# Patient Record
Sex: MALE | Race: BLACK OR AFRICAN AMERICAN | NOT HISPANIC OR LATINO | URBAN - METROPOLITAN AREA
[De-identification: names, ages, dates, MRNs, and addresses within clinical notes are randomized per-mention and may not be internally consistent; named-entity substitution may affect disease eponyms.]

---

## 2017-12-04 PROBLEM — Z00.00 ENCOUNTER FOR PREVENTIVE HEALTH EXAMINATION: Status: ACTIVE | Noted: 2017-12-04

## 2021-05-06 ENCOUNTER — INPATIENT (INPATIENT)
Facility: HOSPITAL | Age: 26
LOS: 6 days | Discharge: HOME | DRG: 392 | End: 2021-05-13
Attending: HOSPITALIST | Admitting: STUDENT IN AN ORGANIZED HEALTH CARE EDUCATION/TRAINING PROGRAM
Payer: COMMERCIAL

## 2021-05-06 VITALS
WEIGHT: 279.99 LBS | RESPIRATION RATE: 18 BRPM | HEART RATE: 88 BPM | TEMPERATURE: 98 F | DIASTOLIC BLOOD PRESSURE: 88 MMHG | SYSTOLIC BLOOD PRESSURE: 138 MMHG | OXYGEN SATURATION: 95 %

## 2021-05-06 LAB
ALBUMIN SERPL ELPH-MCNC: 5.5 G/DL — HIGH (ref 3.3–5)
ALP SERPL-CCNC: 107 U/L — SIGNIFICANT CHANGE UP (ref 40–120)
ALT FLD-CCNC: 69 U/L — HIGH (ref 10–45)
ANION GAP SERPL CALC-SCNC: 26 MMOL/L — HIGH (ref 5–17)
AST SERPL-CCNC: 32 U/L — SIGNIFICANT CHANGE UP (ref 10–40)
BASOPHILS # BLD AUTO: 0.04 K/UL — SIGNIFICANT CHANGE UP (ref 0–0.2)
BASOPHILS NFR BLD AUTO: 0.7 % — SIGNIFICANT CHANGE UP (ref 0–2)
BILIRUB SERPL-MCNC: 1.1 MG/DL — SIGNIFICANT CHANGE UP (ref 0.2–1.2)
BUN SERPL-MCNC: 9 MG/DL — SIGNIFICANT CHANGE UP (ref 7–23)
CALCIUM SERPL-MCNC: 10.8 MG/DL — HIGH (ref 8.4–10.5)
CHLORIDE SERPL-SCNC: 83 MMOL/L — LOW (ref 96–108)
CO2 SERPL-SCNC: 29 MMOL/L — SIGNIFICANT CHANGE UP (ref 22–31)
CREAT SERPL-MCNC: 1.09 MG/DL — SIGNIFICANT CHANGE UP (ref 0.5–1.3)
EOSINOPHIL # BLD AUTO: 0.09 K/UL — SIGNIFICANT CHANGE UP (ref 0–0.5)
EOSINOPHIL NFR BLD AUTO: 1.5 % — SIGNIFICANT CHANGE UP (ref 0–6)
GAS PNL BLDV: SIGNIFICANT CHANGE UP
GAS PNL BLDV: SIGNIFICANT CHANGE UP
GLUCOSE SERPL-MCNC: 79 MG/DL — SIGNIFICANT CHANGE UP (ref 70–99)
HCT VFR BLD CALC: 48.3 % — SIGNIFICANT CHANGE UP (ref 39–50)
HGB BLD-MCNC: 16.8 G/DL — SIGNIFICANT CHANGE UP (ref 13–17)
IMM GRANULOCYTES NFR BLD AUTO: 0.2 % — SIGNIFICANT CHANGE UP (ref 0–1.5)
LIDOCAIN IGE QN: 28 U/L — SIGNIFICANT CHANGE UP (ref 7–60)
LYMPHOCYTES # BLD AUTO: 1.49 K/UL — SIGNIFICANT CHANGE UP (ref 1–3.3)
LYMPHOCYTES # BLD AUTO: 25.3 % — SIGNIFICANT CHANGE UP (ref 13–44)
MCHC RBC-ENTMCNC: 28.8 PG — SIGNIFICANT CHANGE UP (ref 27–34)
MCHC RBC-ENTMCNC: 34.8 GM/DL — SIGNIFICANT CHANGE UP (ref 32–36)
MCV RBC AUTO: 82.7 FL — SIGNIFICANT CHANGE UP (ref 80–100)
MONOCYTES # BLD AUTO: 0.6 K/UL — SIGNIFICANT CHANGE UP (ref 0–0.9)
MONOCYTES NFR BLD AUTO: 10.2 % — SIGNIFICANT CHANGE UP (ref 2–14)
NEUTROPHILS # BLD AUTO: 3.66 K/UL — SIGNIFICANT CHANGE UP (ref 1.8–7.4)
NEUTROPHILS NFR BLD AUTO: 62.1 % — SIGNIFICANT CHANGE UP (ref 43–77)
NRBC # BLD: 0 /100 WBCS — SIGNIFICANT CHANGE UP (ref 0–0)
PLATELET # BLD AUTO: 353 K/UL — SIGNIFICANT CHANGE UP (ref 150–400)
POTASSIUM SERPL-MCNC: 3.1 MMOL/L — LOW (ref 3.5–5.3)
POTASSIUM SERPL-SCNC: 3.1 MMOL/L — LOW (ref 3.5–5.3)
PROT SERPL-MCNC: 9.1 G/DL — HIGH (ref 6–8.3)
RBC # BLD: 5.84 M/UL — HIGH (ref 4.2–5.8)
RBC # FLD: 12.6 % — SIGNIFICANT CHANGE UP (ref 10.3–14.5)
SODIUM SERPL-SCNC: 138 MMOL/L — SIGNIFICANT CHANGE UP (ref 135–145)
WBC # BLD: 5.89 K/UL — SIGNIFICANT CHANGE UP (ref 3.8–10.5)
WBC # FLD AUTO: 5.89 K/UL — SIGNIFICANT CHANGE UP (ref 3.8–10.5)

## 2021-05-06 PROCEDURE — 93010 ELECTROCARDIOGRAM REPORT: CPT

## 2021-05-06 PROCEDURE — 71045 X-RAY EXAM CHEST 1 VIEW: CPT | Mod: 26

## 2021-05-06 PROCEDURE — 93308 TTE F-UP OR LMTD: CPT | Mod: 26

## 2021-05-06 PROCEDURE — 99285 EMERGENCY DEPT VISIT HI MDM: CPT

## 2021-05-06 RX ORDER — SODIUM CHLORIDE 9 MG/ML
1000 INJECTION, SOLUTION INTRAVENOUS ONCE
Refills: 0 | Status: COMPLETED | OUTPATIENT
Start: 2021-05-06 | End: 2021-05-06

## 2021-05-06 RX ORDER — FAMOTIDINE 10 MG/ML
20 INJECTION INTRAVENOUS ONCE
Refills: 0 | Status: COMPLETED | OUTPATIENT
Start: 2021-05-06 | End: 2021-05-06

## 2021-05-06 RX ORDER — ONDANSETRON 8 MG/1
4 TABLET, FILM COATED ORAL ONCE
Refills: 0 | Status: COMPLETED | OUTPATIENT
Start: 2021-05-06 | End: 2021-05-06

## 2021-05-06 RX ORDER — POTASSIUM CHLORIDE 20 MEQ
40 PACKET (EA) ORAL ONCE
Refills: 0 | Status: COMPLETED | OUTPATIENT
Start: 2021-05-06 | End: 2021-05-06

## 2021-05-06 RX ADMIN — SODIUM CHLORIDE 1000 MILLILITER(S): 9 INJECTION, SOLUTION INTRAVENOUS at 23:25

## 2021-05-06 RX ADMIN — Medication 40 MILLIEQUIVALENT(S): at 23:46

## 2021-05-06 RX ADMIN — SODIUM CHLORIDE 1000 MILLILITER(S): 9 INJECTION, SOLUTION INTRAVENOUS at 22:30

## 2021-05-06 RX ADMIN — FAMOTIDINE 20 MILLIGRAM(S): 10 INJECTION INTRAVENOUS at 22:30

## 2021-05-06 RX ADMIN — ONDANSETRON 4 MILLIGRAM(S): 8 TABLET, FILM COATED ORAL at 22:30
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
## 2021-05-06 NOTE — ED ADULT NURSE NOTE - NSIMPLEMENTINTERV_GEN_ALL_ED
Implemented All Universal Safety Interventions:  [place] to call system. Call bell, personal items and telephone within reach. Instruct patient to call for assistance. Room bathroom lighting operational. Non-slip footwear when patient is off stretcher. Physically safe environment: no spills, clutter or unnecessary equipment. Stretcher in lowest position, wheels locked, appropriate side rails in place.
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
## 2021-05-06 NOTE — ED PROVIDER NOTE - CARE PLAN
Principal Discharge DX:	Vomiting   Principal Discharge DX:	Intractable nausea and vomiting  Secondary Diagnosis:	Hypokalemia  Secondary Diagnosis:	Abnormal EKG

## 2021-05-06 NOTE — ED PROVIDER NOTE - ATTENDING CONTRIBUTION TO CARE
Attending MD Raza:  I personally have seen and examined this patient.  Resident note reviewed and agree on plan of care and except where noted.  See HPI, PE, and MDM for details.
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
## 2021-05-06 NOTE — ED PROVIDER NOTE - PROGRESS NOTE DETAILS
Chase Iraheta PGY3  ct abd neg, patient cannot tolerate PO, still having vomiting, admit Kenneth DUNCAN: Received sign out from my colleague Dr. Raza pt presents w repeated episodes of n/v w prior non dx workup at OSH, pending Ct and reassessment at time of sign out, will recheck bmp ct non actionable pt unable to tolerate PO, accepted to medicine.

## 2021-05-07 DIAGNOSIS — E87.6 HYPOKALEMIA: ICD-10-CM

## 2021-05-07 DIAGNOSIS — R11.2 NAUSEA WITH VOMITING, UNSPECIFIED: ICD-10-CM

## 2021-05-07 DIAGNOSIS — E86.0 DEHYDRATION: ICD-10-CM

## 2021-05-07 DIAGNOSIS — Z87.81 PERSONAL HISTORY OF (HEALED) TRAUMATIC FRACTURE: Chronic | ICD-10-CM

## 2021-05-07 DIAGNOSIS — R10.9 UNSPECIFIED ABDOMINAL PAIN: ICD-10-CM

## 2021-05-07 DIAGNOSIS — R11.10 VOMITING, UNSPECIFIED: ICD-10-CM

## 2021-05-07 DIAGNOSIS — Z29.9 ENCOUNTER FOR PROPHYLACTIC MEASURES, UNSPECIFIED: ICD-10-CM

## 2021-05-07 LAB
AMPHET UR-MCNC: NEGATIVE — SIGNIFICANT CHANGE UP
ANION GAP SERPL CALC-SCNC: 20 MMOL/L — HIGH (ref 5–17)
APPEARANCE UR: CLEAR — SIGNIFICANT CHANGE UP
BACTERIA # UR AUTO: NEGATIVE — SIGNIFICANT CHANGE UP
BARBITURATES UR SCN-MCNC: NEGATIVE — SIGNIFICANT CHANGE UP
BENZODIAZ UR-MCNC: NEGATIVE — SIGNIFICANT CHANGE UP
BILIRUB UR-MCNC: ABNORMAL
BUN SERPL-MCNC: 8 MG/DL — SIGNIFICANT CHANGE UP (ref 7–23)
CALCIUM SERPL-MCNC: 10 MG/DL — SIGNIFICANT CHANGE UP (ref 8.4–10.5)
CHLORIDE SERPL-SCNC: 86 MMOL/L — LOW (ref 96–108)
CO2 SERPL-SCNC: 28 MMOL/L — SIGNIFICANT CHANGE UP (ref 22–31)
COCAINE METAB.OTHER UR-MCNC: NEGATIVE — SIGNIFICANT CHANGE UP
COLOR SPEC: YELLOW — SIGNIFICANT CHANGE UP
CREAT SERPL-MCNC: 1 MG/DL — SIGNIFICANT CHANGE UP (ref 0.5–1.3)
DIFF PNL FLD: NEGATIVE — SIGNIFICANT CHANGE UP
EPI CELLS # UR: 2 /HPF — SIGNIFICANT CHANGE UP
GLUCOSE SERPL-MCNC: 84 MG/DL — SIGNIFICANT CHANGE UP (ref 70–99)
GLUCOSE UR QL: NEGATIVE — SIGNIFICANT CHANGE UP
HYALINE CASTS # UR AUTO: 7 /LPF — HIGH (ref 0–2)
KETONES UR-MCNC: ABNORMAL
LEUKOCYTE ESTERASE UR-ACNC: NEGATIVE — SIGNIFICANT CHANGE UP
MAGNESIUM SERPL-MCNC: 1.8 MG/DL — SIGNIFICANT CHANGE UP (ref 1.6–2.6)
METHADONE UR-MCNC: NEGATIVE — SIGNIFICANT CHANGE UP
NITRITE UR-MCNC: NEGATIVE — SIGNIFICANT CHANGE UP
OPIATES UR-MCNC: NEGATIVE — SIGNIFICANT CHANGE UP
OXYCODONE UR-MCNC: NEGATIVE — SIGNIFICANT CHANGE UP
PCP SPEC-MCNC: SIGNIFICANT CHANGE UP
PCP UR-MCNC: NEGATIVE — SIGNIFICANT CHANGE UP
PH UR: 6.5 — SIGNIFICANT CHANGE UP (ref 5–8)
PHOSPHATE SERPL-MCNC: 3.6 MG/DL — SIGNIFICANT CHANGE UP (ref 2.5–4.5)
POTASSIUM SERPL-MCNC: 3 MMOL/L — LOW (ref 3.5–5.3)
POTASSIUM SERPL-SCNC: 3 MMOL/L — LOW (ref 3.5–5.3)
PROT UR-MCNC: ABNORMAL
RBC CASTS # UR COMP ASSIST: 1 /HPF — SIGNIFICANT CHANGE UP (ref 0–4)
SARS-COV-2 RNA SPEC QL NAA+PROBE: SIGNIFICANT CHANGE UP
SODIUM SERPL-SCNC: 134 MMOL/L — LOW (ref 135–145)
SP GR SPEC: >1.05 (ref 1.01–1.02)
THC UR QL: POSITIVE
TROPONIN T, HIGH SENSITIVITY RESULT: 11 NG/L — SIGNIFICANT CHANGE UP (ref 0–51)
UROBILINOGEN FLD QL: ABNORMAL
WBC UR QL: 2 /HPF — SIGNIFICANT CHANGE UP (ref 0–5)

## 2021-05-07 PROCEDURE — 99223 1ST HOSP IP/OBS HIGH 75: CPT | Mod: GC

## 2021-05-07 PROCEDURE — 99223 1ST HOSP IP/OBS HIGH 75: CPT

## 2021-05-07 PROCEDURE — 74177 CT ABD & PELVIS W/CONTRAST: CPT | Mod: 26,MG

## 2021-05-07 PROCEDURE — 12345: CPT | Mod: NC

## 2021-05-07 PROCEDURE — G1004: CPT

## 2021-05-07 RX ORDER — POTASSIUM CHLORIDE 20 MEQ
40 PACKET (EA) ORAL ONCE
Refills: 0 | Status: COMPLETED | OUTPATIENT
Start: 2021-05-07 | End: 2021-05-07

## 2021-05-07 RX ORDER — ONDANSETRON 8 MG/1
4 TABLET, FILM COATED ORAL EVERY 6 HOURS
Refills: 0 | Status: DISCONTINUED | OUTPATIENT
Start: 2021-05-07 | End: 2021-05-13

## 2021-05-07 RX ORDER — ONDANSETRON 8 MG/1
4 TABLET, FILM COATED ORAL ONCE
Refills: 0 | Status: COMPLETED | OUTPATIENT
Start: 2021-05-07 | End: 2021-05-07

## 2021-05-07 RX ORDER — MAGNESIUM SULFATE 500 MG/ML
2 VIAL (ML) INJECTION ONCE
Refills: 0 | Status: COMPLETED | OUTPATIENT
Start: 2021-05-07 | End: 2021-05-07

## 2021-05-07 RX ORDER — PANTOPRAZOLE SODIUM 20 MG/1
40 TABLET, DELAYED RELEASE ORAL
Refills: 0 | Status: DISCONTINUED | OUTPATIENT
Start: 2021-05-07 | End: 2021-05-13

## 2021-05-07 RX ORDER — SODIUM CHLORIDE 9 MG/ML
1000 INJECTION, SOLUTION INTRAVENOUS
Refills: 0 | Status: DISCONTINUED | OUTPATIENT
Start: 2021-05-07 | End: 2021-05-13

## 2021-05-07 RX ADMIN — Medication 50 GRAM(S): at 12:49

## 2021-05-07 RX ADMIN — Medication 30 MILLILITER(S): at 22:08

## 2021-05-07 RX ADMIN — SODIUM CHLORIDE 100 MILLILITER(S): 9 INJECTION, SOLUTION INTRAVENOUS at 12:49

## 2021-05-07 RX ADMIN — Medication 40 MILLIEQUIVALENT(S): at 12:49

## 2021-05-07 RX ADMIN — ONDANSETRON 4 MILLIGRAM(S): 8 TABLET, FILM COATED ORAL at 04:26

## 2021-05-07 RX ADMIN — PANTOPRAZOLE SODIUM 40 MILLIGRAM(S): 20 TABLET, DELAYED RELEASE ORAL at 06:37

## 2021-05-07 RX ADMIN — SODIUM CHLORIDE 100 MILLILITER(S): 9 INJECTION, SOLUTION INTRAVENOUS at 23:19

## 2021-05-07 RX ADMIN — SODIUM CHLORIDE 100 MILLILITER(S): 9 INJECTION, SOLUTION INTRAVENOUS at 04:26

## 2021-05-07 RX ADMIN — Medication 40 MILLIEQUIVALENT(S): at 06:37

## 2021-05-07 NOTE — CONSULT NOTE ADULT - ATTENDING COMMENTS
Patient seen and examined, agree with above. he reports ~1 month of nausea/vomiting and epigastric pain. No changes in bowel habits, more constipated due to not eating, no diarrhea. Reports a 40 lb weight loss. Has had recent CT, EGD at OSH that was unremarkable per patient. Would obtain gastric emptying study to r/o gastroparesis. Obtain urine toxicology screen to evaluate for THC use. Patient seen and examined, agree with above. he reports ~1 month of nausea/vomiting and epigastric pain. Denies current marijuana use. No changes in bowel habits, more constipated due to not eating, no diarrhea. Reports a 40 lb weight loss. Has had recent CT, EGD at OSH that was unremarkable per patient. Would obtain gastric emptying study to r/o gastroparesis. Obtain urine toxicology screen to evaluate for THC use.

## 2021-05-07 NOTE — H&P ADULT - PROBLEM SELECTOR PLAN 4
DVT ppx: scds Likely secondary to vomiting, repleted  EKG w/ T wave inversions, non specific; Trop neg x 2, no active cardiac symptoms

## 2021-05-07 NOTE — CONSULT NOTE ADULT - ASSESSMENT
26 y/o M with no significant PMH now presenting with 1 month of intractable nausea and vomiting. Currently admitted for further workup.    #Nausea/Vomiting- Patient with 1 month history of abdominal cramping a/w persistent nausea/vomiting. Previous workup at OSH reportedly WNL. Current CT A/P normal with normal lipase level. Differential includes gastroparesis vs gastric outlet obstruction vs cyclic vomiting syndrome.  -  26 y/o M with no significant PMH now presenting with 1 month of intractable nausea and vomiting. Currently admitted for further workup.    #Nausea/Vomiting- Patient with 1 month history of abdominal cramping a/w persistent nausea/vomiting. Previous workup at OSH reportedly WNL. Current CT A/P normal with normal lipase level. Differential includes gastroparesis vs gastric outlet obstruction vs cyclic vomiting syndrome.  - please obtain records of previous imaging/endoscopy performed at [facility]  - advance diet as tolerated  - IVF and supportive care per primary team  - as patient with minimal symptom improvement with zofran, can trial reglan 10mg TID  - avoid additional QTc prolongation agents while patient is receiving zofran/reglan    PLEASE AWAIT ATTENDING ATTESTATION BELOW    Cinthia Murguia MD  PGY-2, Internal Medicine  Pager: [phone] 24 y/o M with no significant PMH now presenting with 1 month of intractable nausea and vomiting. Currently admitted for further workup.    #Nausea/Vomiting- Patient with 1 month history of abdominal cramping a/w persistent nausea/vomiting. Previous workup at OSH reportedly WNL. Current CT A/P normal with normal lipase level. Differential includes gastroparesis vs gastric outlet obstruction vs cyclic vomiting syndrome.  - please obtain records of previous imaging/endoscopy performed at [facility]  - advance diet as tolerated  - IVF and supportive care per primary team  - obtain utox to r/o current marijuana use  - please obtain gastric emptying study  - if negative for delayed transit/gastroparesis, may need to consider non-GI causes of nausea    PLEASE AWAIT ATTENDING ATTESTATION BELOW    Cinthia Murguia MD  PGY-2, Internal Medicine  Pager: [phone] 24 y/o M with no significant PMH now presenting with 1 month of intractable nausea and vomiting. Currently admitted for further workup.    #Nausea/Vomiting- Patient with 1 month history of abdominal cramping a/w persistent nausea/vomiting. Previous workup at OSH reportedly WNL. Current CT A/P normal with normal lipase level. Differential includes gastroparesis vs gastric outlet obstruction vs cyclic vomiting syndrome, vs hyperemesis cannabis if patient does use marijuana.  - please obtain records of previous imaging/endoscopy performed at [facility]  - advance diet as tolerated  - IVF and supportive care per primary team  - obtain utox to r/o current marijuana use  - please obtain gastric emptying study  - Zofran PRN  - if negative for delayed transit/gastroparesis, may need to consider non-GI causes of nausea      Cinthia Murguia MD  PGY-2, Internal Medicine  Pager: [phone]

## 2021-05-07 NOTE — H&P ADULT - NSHPREVIEWOFSYSTEMS_GEN_ALL_CORE
REVIEW OF SYSTEMS:  CONSTITUTIONAL: No weakness. No fevers. No chills. No rigors. No weight loss. No night sweats. No poor appetite.  EYES: No blurry or double vision. No eye pain.  ENT: No hearing difficulty. No vertigo. No dysphagia. No sore throat. No Sinusitis/rhinorrhea.   NECK: No pain. No stiffness/rigidity.  CARDIAC: No chest pain. No palpitations. No lightheadedness. No syncope.  RESPIRATORY: No cough. No SOB. No hemoptysis.  GASTROINTESTINAL: See HPI.  GENITOURINARY: No dysuria. No frequency. No hesitancy. No hematuria. No oliguria.  NEUROLOGICAL: No numbness/tingling. No focal weakness. No urinary or fecal incontinence. No headache. No unsteady gait.  BACK: No back pain. No flank pain.  EXTREMITIES: No lower extremity edema. Full ROM. No joint pain.  SKIN: No rashes. No itching. No other lesions.  PSYCHIATRIC: No depression. No anxiety. No SI/HI.  ALLERGIC: No lip swelling. No hives.  All other review of systems is negative unless indicated above.  Unless indicated above, unable to assess ROS 2/2

## 2021-05-07 NOTE — H&P ADULT - ASSESSMENT
25 yr old male with no significant past medical history presents w/ nausea and vomiting for the last month.  Currently unable to tolerate po.

## 2021-05-07 NOTE — H&P ADULT - PROBLEM SELECTOR PLAN 1
Unclear etiology; per pt, has had workup at outside hospital, including CT A/P, Abdominal US, and EGD which reportedly were unremarkable; pt was to follow up with GI at [facility] (pt unable to recall Dr's name) but presented to ED due to dehydration.  CT A/P here unremarkable. Lipase wnl.   Now w/ ?hemetemesis. Endorses occasional/intermittent coffee ground emesis. Hgb stable. Likely secondary to nahun vu tear from repeat vomiting.   - Ddx includes gastroparesis, cyclical vomiting syndrome vs other  - GI consult in am (email sent)  - clear liquid diet

## 2021-05-07 NOTE — H&P ADULT - NSHPLABSRESULTS_GEN_ALL_CORE
Personally reviewed labs.   Personally reviewed imaging.   Personally reviewed EKG.                           16.8   5.89  )-----------( 353      ( 06 May 2021 22:44 )             48.3     134<L>  |  86<L>  |  8   ----------------------------<  84  3.0<L>   |  28  |  1.00    Ca    10.0      07 May 2021 02:10  Phos  3.6     05-07  Mg     1.8     05-07    TPro  9.1<H>  /  Alb  5.5<H>  /  TBili  1.1  /  DBili  x   /  AST  32  /  ALT  69<H>  /  AlkPhos  107  05-06    Trop 9 --> 11  EKG: TWI in II, III, avF, V3-V6, NSR    LIVER FUNCTIONS - ( 06 May 2021 22:44 )  Alb: 5.5 g/dL / Pro: 9.1 g/dL / ALK PHOS: 107 U/L / ALT: 69 U/L / AST: 32 U/L / GGT: x Personally reviewed labs.   Personally reviewed imaging.   Personally reviewed EKG.                           16.8   5.89  )-----------( 353      ( 06 May 2021 22:44 )             48.3     134<L>  |  86<L>  |  8   ----------------------------<  84  3.0<L>   |  28  |  1.00    Ca    10.0      07 May 2021 02:10  Phos  3.6     05-07  Mg     1.8     05-07    TPro  9.1<H>  /  Alb  5.5<H>  /  TBili  1.1  /  DBili  x   /  AST  32  /  ALT  69<H>  /  AlkPhos  107  05-06      LIVER FUNCTIONS - ( 06 May 2021 22:44 )  Alb: 5.5 g/dL / Pro: 9.1 g/dL / ALK PHOS: 107 U/L / ALT: 69 U/L / AST: 32 U/L / GGT: x      Trop 9 --> 11  EKG: TWI in II, III, avF, V3-V6, NSR  CT A/P: No acute intra-abdominal pathology.
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
## 2021-05-07 NOTE — H&P ADULT - PROBLEM SELECTOR PLAN 2
- workup as above  - start pantoprazole due to reported acid reflux and abd pain  - Clear liquid diet

## 2021-05-07 NOTE — H&P ADULT - NSHPPHYSICALEXAM_GEN_ALL_CORE
PHYSICAL EXAM:   GENERAL: Alert. Not confused. No acute distress. Not thin. Not cachectic. Not obese.  HEAD:  Atraumatic. Normocephalic.  EYES: EOMI. PERRLA. Normal conjunctiva/sclera.  ENT: Neck supple. No JVD. Moist oral mucosa. Not edentulous. No thrush.  LYMPH: Normal supraclavicular/cervical lymph nodes.   CARDIAC: No tachy, Not radha. Regular rhythm. Not irregularly irregular. S1. S2. No murmur. No rub. No distant heart sounds.  LUNG/CHEST: CTAB. BS equal bilaterally. No wheezes. No rales. No rhonchi.  ABDOMEN: Soft. +TTP to epigastric region. No distension. No fluid wave. Normal bowel sounds. No rebound or guarding.   BACK: No midline/vertebral tenderness. No flank tenderness.  VASCULAR: +2 b/l radial pulses. Palpable DP pulses.  EXTREMITIES:  No clubbing. No cyanosis. No edema. Moving all 4.  NEUROLOGY: A&Ox3. Non-focal exam. Cranial nerves intact. Normal speech. Sensation intact.  PSYCH: Normal behavior. Normal affect.  SKIN: No jaundice. No erythema. No rash/lesion.  Vascular Access:     ICU Vital Signs Last 24 Hrs  T(C): 36.7 (07 May 2021 03:34), Max: 36.8 (06 May 2021 18:10)  T(F): 98 (07 May 2021 03:34), Max: 98.2 (06 May 2021 18:10)  HR: 78 (07 May 2021 03:34) (68 - 88)  BP: 126/84 (07 May 2021 03:34) (126/84 - 138/88)  BP(mean): --  ABP: --  ABP(mean): --  RR: 17 (07 May 2021 03:34) (16 - 18)  SpO2: 97% (07 May 2021 03:34) (95% - 100%)      I&O's Summary PHYSICAL EXAM:   GENERAL: Alert. Not confused. No acute distress. Not thin. Not cachectic. Not obese.  HEAD:  Atraumatic. Normocephalic.  EYES: EOMI. PERRLA. Normal conjunctiva/sclera.  ENT: Neck supple. No JVD. Dry oral mucosa.    LYMPH: Normal supraclavicular/cervical lymph nodes.   CARDIAC: No tachy, Not radha. Regular rhythm. Not irregularly irregular. S1. S2. No murmur. No rub. No distant heart sounds.  LUNG/CHEST: CTAB. BS equal bilaterally. No wheezes. No rales. No rhonchi.  ABDOMEN: Soft. +TTP to epigastric region. No distension. No fluid wave. Normal bowel sounds. No rebound or guarding.   BACK: No midline/vertebral tenderness. No flank tenderness.  VASCULAR: +2 b/l radial pulses. Palpable DP pulses.  EXTREMITIES:  No clubbing. No cyanosis. No edema. Moving all 4.  NEUROLOGY: A&Ox3. Non-focal exam. Cranial nerves intact. Normal speech. Sensation intact.  PSYCH: Normal behavior. Normal affect.  SKIN: No jaundice. No erythema. No rash/lesion.  Vascular Access:     ICU Vital Signs Last 24 Hrs  T(C): 36.7 (07 May 2021 03:34), Max: 36.8 (06 May 2021 18:10)  T(F): 98 (07 May 2021 03:34), Max: 98.2 (06 May 2021 18:10)  HR: 78 (07 May 2021 03:34) (68 - 88)  BP: 126/84 (07 May 2021 03:34) (126/84 - 138/88)  BP(mean): --  ABP: --  ABP(mean): --  RR: 17 (07 May 2021 03:34) (16 - 18)  SpO2: 97% (07 May 2021 03:34) (95% - 100%)      I&O's Summary

## 2021-05-07 NOTE — H&P ADULT - PROBLEM SELECTOR PLAN 3
Likely secondary to vomiting, repleted  EKG w/ T wave inversions, non specific; Trop neg x 2, no active cardiac symptoms c/w IVF - LR at 100 cc x 6 hrs  monitor sCr
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
## 2021-05-08 LAB
COVID-19 SPIKE DOMAIN AB INTERP: NEGATIVE — SIGNIFICANT CHANGE UP
COVID-19 SPIKE DOMAIN ANTIBODY RESULT: 0.4 U/ML — SIGNIFICANT CHANGE UP
SARS-COV-2 IGG+IGM SERPL QL IA: 0.4 U/ML — SIGNIFICANT CHANGE UP
SARS-COV-2 IGG+IGM SERPL QL IA: NEGATIVE — SIGNIFICANT CHANGE UP

## 2021-05-08 PROCEDURE — 99233 SBSQ HOSP IP/OBS HIGH 50: CPT

## 2021-05-08 RX ORDER — METOCLOPRAMIDE HCL 10 MG
5 TABLET ORAL ONCE
Refills: 0 | Status: COMPLETED | OUTPATIENT
Start: 2021-05-08 | End: 2021-05-08

## 2021-05-08 RX ADMIN — SODIUM CHLORIDE 100 MILLILITER(S): 9 INJECTION, SOLUTION INTRAVENOUS at 05:54

## 2021-05-08 RX ADMIN — SODIUM CHLORIDE 100 MILLILITER(S): 9 INJECTION, SOLUTION INTRAVENOUS at 21:07

## 2021-05-08 RX ADMIN — Medication 30 MILLILITER(S): at 05:54

## 2021-05-08 RX ADMIN — Medication 30 MILLILITER(S): at 21:08

## 2021-05-08 RX ADMIN — PANTOPRAZOLE SODIUM 40 MILLIGRAM(S): 20 TABLET, DELAYED RELEASE ORAL at 05:54

## 2021-05-08 RX ADMIN — ONDANSETRON 4 MILLIGRAM(S): 8 TABLET, FILM COATED ORAL at 10:22

## 2021-05-08 RX ADMIN — Medication 5 MILLIGRAM(S): at 18:56

## 2021-05-08 NOTE — PROGRESS NOTE ADULT - ASSESSMENT
25M with no significant past medical history presents w/ nausea and vomiting x 1 montj     Problem/Plan - 1:  ·  Problem: Non-intractable vomiting with nausea.  Plan: unclear etiology; ddx includes gastroparesis vs cannabinoid hyperemesis syndrome as all other potential causes have been ruled out  - EGD/colonoscopy records from [facility] reviewed (in chart): findings of gastritis, biopsy negative for H pylori; no other abnormalities  -CT abd negative for acute findings  -Utox positive for marijuana despite pt stating that he hasnt indulged in the past few months; states that he hangs around multiple people who smoke a lot; education provided  -Pending gastric emptying study  -Appreciate GI recs  -Cont zofran PRN nausea     Problem/Plan - 2:  ·  Problem: Abdominal pain.  Plan: - workup as above, improved  - start pantoprazole due to reported acid reflux and abd pain  - Cont clear liquid diet.      Problem/Plan - 3:  ·  Problem: Dehydration. Plan: c/w IVF - LR at 100 cc x 6 hrs  monitor sCr.     Problem/Plan - 4:  ·  Problem: Hypokalemia/electrolyte derangements. Plan: Likely secondary to vomiting, repleted  EKG w/ T wave inversions, non specific; Trop neg x 2, no active cardiac symptoms.  Monitor daily and supplement     Problem/Plan - 5:  ·  Problem: Prophylactic measure.  Plan: DVT ppx: scds.

## 2021-05-08 NOTE — PROGRESS NOTE ADULT - SUBJECTIVE AND OBJECTIVE BOX
[facility] Division of Hospital Medicine  Marva Bradford MD  Pager (M-F, 8A-5P): [phone]  Other Times:  [phone]    Patient is a 25y old  Male who presents with a chief complaint of non-intractable nausea and vomiting (07 May 2021 09:33)      SUBJECTIVE / OVERNIGHT EVENTS: No acute events. Still reports poor oral intake and vomiting.    ADDITIONAL REVIEW OF SYSTEMS: Negative    MEDICATIONS  (STANDING):  bismuth subsalicylate Liquid 30 milliLiter(s) Oral every 8 hours  lactated ringers. 1000 milliLiter(s) (100 mL/Hr) IV Continuous <Continuous>  pantoprazole    Tablet 40 milliGRAM(s) Oral before breakfast    MEDICATIONS  (PRN):  ondansetron Injectable 4 milliGRAM(s) IV Push every 6 hours PRN Nausea and/or Vomiting      CAPILLARY BLOOD GLUCOSE        I&O's Summary    07 May 2021 07:01  -  08 May 2021 07:00  --------------------------------------------------------  IN: 1280 mL / OUT: 0 mL / NET: 1280 mL    08 May 2021 07:01  -  08 May 2021 14:39  --------------------------------------------------------  IN: 360 mL / OUT: 0 mL / NET: 360 mL        PHYSICAL EXAM:  Vital Signs Last 24 Hrs  T(C): 36.8 (08 May 2021 13:39), Max: 36.8 (07 May 2021 21:00)  T(F): 98.3 (08 May 2021 13:39), Max: 98.3 (08 May 2021 01:04)  HR: 90 (08 May 2021 13:39) (60 - 90)  BP: 150/81 (08 May 2021 13:39) (124/83 - 150/81)  BP(mean): --  RR: 18 (08 May 2021 13:39) (16 - 18)  SpO2: 92% (08 May 2021 13:39) (92% - 98%)    CONSTITUTIONAL: NAD, well-developed, well-groomed  EYES: PERRLA; conjunctiva and sclera clear  ENMT: Moist oral mucosa, no pharyngeal injection or exudates; normal dentition  NECK: Supple, no palpable masses; no thyromegaly  RESPIRATORY: Normal respiratory effort; lungs are clear to auscultation bilaterally  CARDIOVASCULAR: Regular rate and rhythm, normal S1 and S2, no murmur/rub/gallop; No lower extremity edema; Peripheral pulses are 2+ bilaterally  ABDOMEN: Nontender to palpation, normoactive bowel sounds, no rebound/guarding; No hepatosplenomegaly  MUSCULOSKELETAL:  no clubbing or cyanosis of digits; no joint swelling or tenderness to palpation  PSYCH: A+O to person, place, and time; affect appropriate  NEUROLOGY: CN 2-12 are intact and symmetric; no gross sensory deficits   SKIN: No rashes; no palpable lesions    LABS:                        16.8   5.89  )-----------( 353      ( 06 May 2021 22:44 )             48.3     05-07    134<L>  |  86<L>  |  8   ----------------------------<  84  3.0<L>   |  28  |  1.00    Ca    10.0      07 May 2021 02:10  Phos  3.6     05-07  Mg     1.8     05-07    TPro  9.1<H>  /  Alb  5.5<H>  /  TBili  1.1  /  DBili  x   /  AST  32  /  ALT  69<H>  /  AlkPhos  107  05-06          Urinalysis Basic - ( 07 May 2021 13:11 )    Color: Yellow / Appearance: Clear / SG: >1.050 / pH: x  Gluc: x / Ketone: Large  / Bili: Small / Urobili: 6 mg/dL   Blood: x / Protein: 30 mg/dL / Nitrite: Negative   Leuk Esterase: Negative / RBC: 1 /hpf / WBC 2 /HPF   Sq Epi: x / Non Sq Epi: 2 /hpf / Bacteria: Negative

## 2021-05-09 LAB
ANION GAP SERPL CALC-SCNC: 16 MMOL/L — SIGNIFICANT CHANGE UP (ref 5–17)
ANION GAP SERPL CALC-SCNC: 18 MMOL/L — HIGH (ref 5–17)
BUN SERPL-MCNC: 4 MG/DL — LOW (ref 7–23)
BUN SERPL-MCNC: <4 MG/DL — LOW (ref 7–23)
CALCIUM SERPL-MCNC: 9.5 MG/DL — SIGNIFICANT CHANGE UP (ref 8.4–10.5)
CALCIUM SERPL-MCNC: 9.6 MG/DL — SIGNIFICANT CHANGE UP (ref 8.4–10.5)
CHLORIDE SERPL-SCNC: 94 MMOL/L — LOW (ref 96–108)
CHLORIDE SERPL-SCNC: 96 MMOL/L — SIGNIFICANT CHANGE UP (ref 96–108)
CK MB BLD-MCNC: 0.8 % — SIGNIFICANT CHANGE UP (ref 0–3.5)
CK MB CFR SERPL CALC: 1 NG/ML — SIGNIFICANT CHANGE UP (ref 0–6.7)
CK SERPL-CCNC: 121 U/L — SIGNIFICANT CHANGE UP (ref 30–200)
CO2 SERPL-SCNC: 24 MMOL/L — SIGNIFICANT CHANGE UP (ref 22–31)
CO2 SERPL-SCNC: 27 MMOL/L — SIGNIFICANT CHANGE UP (ref 22–31)
CREAT SERPL-MCNC: 0.87 MG/DL — SIGNIFICANT CHANGE UP (ref 0.5–1.3)
CREAT SERPL-MCNC: 0.96 MG/DL — SIGNIFICANT CHANGE UP (ref 0.5–1.3)
GLUCOSE SERPL-MCNC: 79 MG/DL — SIGNIFICANT CHANGE UP (ref 70–99)
GLUCOSE SERPL-MCNC: 82 MG/DL — SIGNIFICANT CHANGE UP (ref 70–99)
MAGNESIUM SERPL-MCNC: 1.7 MG/DL — SIGNIFICANT CHANGE UP (ref 1.6–2.6)
MAGNESIUM SERPL-MCNC: 1.8 MG/DL — SIGNIFICANT CHANGE UP (ref 1.6–2.6)
PHOSPHATE SERPL-MCNC: 3 MG/DL — SIGNIFICANT CHANGE UP (ref 2.5–4.5)
PHOSPHATE SERPL-MCNC: 3.6 MG/DL — SIGNIFICANT CHANGE UP (ref 2.5–4.5)
POTASSIUM SERPL-MCNC: 3.1 MMOL/L — LOW (ref 3.5–5.3)
POTASSIUM SERPL-MCNC: 3.4 MMOL/L — LOW (ref 3.5–5.3)
POTASSIUM SERPL-SCNC: 3.1 MMOL/L — LOW (ref 3.5–5.3)
POTASSIUM SERPL-SCNC: 3.4 MMOL/L — LOW (ref 3.5–5.3)
SODIUM SERPL-SCNC: 137 MMOL/L — SIGNIFICANT CHANGE UP (ref 135–145)
SODIUM SERPL-SCNC: 138 MMOL/L — SIGNIFICANT CHANGE UP (ref 135–145)
TROPONIN T, HIGH SENSITIVITY RESULT: 9 NG/L — SIGNIFICANT CHANGE UP (ref 0–51)

## 2021-05-09 PROCEDURE — 93010 ELECTROCARDIOGRAM REPORT: CPT

## 2021-05-09 PROCEDURE — 99232 SBSQ HOSP IP/OBS MODERATE 35: CPT | Mod: GC

## 2021-05-09 RX ORDER — POTASSIUM CHLORIDE 20 MEQ
40 PACKET (EA) ORAL EVERY 4 HOURS
Refills: 0 | Status: COMPLETED | OUTPATIENT
Start: 2021-05-09 | End: 2021-05-09

## 2021-05-09 RX ORDER — POLYETHYLENE GLYCOL 3350 17 G/17G
17 POWDER, FOR SOLUTION ORAL DAILY
Refills: 0 | Status: DISCONTINUED | OUTPATIENT
Start: 2021-05-09 | End: 2021-05-13

## 2021-05-09 RX ORDER — MAGNESIUM SULFATE 500 MG/ML
1 VIAL (ML) INJECTION ONCE
Refills: 0 | Status: COMPLETED | OUTPATIENT
Start: 2021-05-09 | End: 2021-05-09

## 2021-05-09 RX ORDER — POTASSIUM CHLORIDE 20 MEQ
40 PACKET (EA) ORAL EVERY 4 HOURS
Refills: 0 | Status: COMPLETED | OUTPATIENT
Start: 2021-05-09 | End: 2021-05-10

## 2021-05-09 RX ADMIN — Medication 40 MILLIEQUIVALENT(S): at 13:02

## 2021-05-09 RX ADMIN — Medication 100 GRAM(S): at 22:04

## 2021-05-09 RX ADMIN — Medication 40 MILLIEQUIVALENT(S): at 17:16

## 2021-05-09 RX ADMIN — Medication 12.5 MILLIGRAM(S): at 17:16

## 2021-05-09 RX ADMIN — Medication 40 MILLIEQUIVALENT(S): at 21:50

## 2021-05-09 NOTE — PROGRESS NOTE ADULT - ASSESSMENT
25M with no significant past medical history presents w/ nausea and vomiting x 1 montj     Problem/Plan - 1:  ·  Problem: Non-intractable vomiting with nausea.  Plan: unclear etiology; ddx includes gastroparesis vs cannabinoid hyperemesis syndrome as all other potential causes have been ruled out  -EGD/colonoscopy records from [facility] reviewed (in chart): findings of gastritis, biopsy negative for H pylori; no other abnormalities  -CT abd negative for acute findings  -Utox positive for marijuana despite pt stating that he hasnt indulged in the past few months; states that he hangs around multiple people who smoke a lot; education provided  -Pending gastric emptying study  -Appreciate GI recs  -Cont zofran PRN nausea; add promethazine 12.5mg Q6H standing for now  -Repeat EKG to monitor QTC  -Monitor electrolytes and replete     Problem/Plan - 2:  ·  Problem: Abdominal pain.  Plan: - workup as above, improved  - start pantoprazole due to reported acid reflux and abd pain  - Cont clear liquid diet.      Problem/Plan - 3:  ·  Problem: Dehydration. Plan: c/w IVF - LR at 100 cc x 6 hrs  monitor sCr.     Problem/Plan - 4:  ·  Problem: Hypokalemia/electrolyte derangements. Plan: Likely secondary to vomiting, repleted  EKG w/ T wave inversions, non specific; Trop neg x 2, no active cardiac symptoms.  Monitor daily and supplement     Problem/Plan - 5:  ·  Problem: Prophylactic measure.  Plan: DVT ppx: scds.

## 2021-05-09 NOTE — PROGRESS NOTE ADULT - SUBJECTIVE AND OBJECTIVE BOX
[facility] Division of Hospital Medicine  Marva Bradford MD  Pager (M-F, 8D-5P): [phone]  Other Times:  [phone]    Patient is a 25y old  Male who presents with a chief complaint of non-intractable nausea and vomiting (07 May 2021 09:33)      SUBJECTIVE / OVERNIGHT EVENTS: Pt still vomiting not improved with antiemetics.    ADDITIONAL REVIEW OF SYSTEMS: Negative    MEDICATIONS  (STANDING):  bismuth subsalicylate Liquid 30 milliLiter(s) Oral every 8 hours  lactated ringers. 1000 milliLiter(s) (100 mL/Hr) IV Continuous <Continuous>  pantoprazole    Tablet 40 milliGRAM(s) Oral before breakfast    MEDICATIONS  (PRN):  ondansetron Injectable 4 milliGRAM(s) IV Push every 6 hours PRN Nausea and/or Vomiting        PHYSICAL EXAM:  T(C): 36.8 (05-09-21 @ 12:30), Max: 36.8 (05-08-21 @ 13:39)  T(F): 98.2 (05-09-21 @ 12:30), Max: 98.3 (05-08-21 @ 13:39)  HR: 60 (05-09-21 @ 12:30) (60 - 90)  BP: 132/83 (05-09-21 @ 12:30) (123/88 - 150/81)  RR: 18 (05-09-21 @ 12:30) (18 - 18)  SpO2: 95% (05-09-21 @ 12:30) (92% - 96%)  Wt(kg): --    CONSTITUTIONAL: NAD, well-developed, well-groomed  EYES: PERRLA; conjunctiva and sclera clear  ENMT: Moist oral mucosa, no pharyngeal injection or exudates; normal dentition  NECK: Supple, no palpable masses; no thyromegaly  RESPIRATORY: Normal respiratory effort; lungs are clear to auscultation bilaterally  CARDIOVASCULAR: Regular rate and rhythm, normal S1 and S2, no murmur/rub/gallop; No lower extremity edema; Peripheral pulses are 2+ bilaterally  ABDOMEN: Nontender to palpation, normoactive bowel sounds, no rebound/guarding; No hepatosplenomegaly  MUSCULOSKELETAL:  no clubbing or cyanosis of digits; no joint swelling or tenderness to palpation  PSYCH: A+O to person, place, and time; affect appropriate  NEUROLOGY: CN 2-12 are intact and symmetric; no gross sensory deficits   SKIN: No rashes; no palpable lesions    LABS:                         05-09    137  |  94<L>  |  4<L>  ----------------------------<  82  3.1<L>   |  27  |  0.96    Ca    9.6      09 May 2021 11:50  Phos  3.6     05-09  Mg     1.8     05-09                              CAPILLARY BLOOD GLUCOSE

## 2021-05-09 NOTE — CHART NOTE - NSCHARTNOTEFT_GEN_A_CORE
***incomplete note Notified by RN pt with chest pain. Examined pt at bedside, who is accompanied by his mother.    Pt states he has been having similar chest pain intermittently since time of admission. Describes pain as a "knot" in midsternum. Rates pain as 5/10. Denies radiation of pain to left or right side. Denies SOB, palpitations, headache, leg pain, dizziness, lightheadedness. States he has had similar pain intermittently over the past month when he feels dehydrated. States pain is alleviated when he eats ice chips. Pain is not reproducible, does not change with movement, and is not pleuritic. Pt still with nausea/vomiting, which improves with Zofran.      Vital Signs Last 24 Hrs  T(C): 36.8 (09 May 2021 19:30), Max: 37.1 (09 May 2021 16:07)  T(F): 98.3 (09 May 2021 19:30), Max: 98.7 (09 May 2021 16:07)  HR: 57 (09 May 2021 19:30) (57 - 66)  BP: 145/77 (09 May 2021 19:30) (123/88 - 145/77)  BP(mean): --  RR: 18 (09 May 2021 19:30) (18 - 18)  SpO2: 97% (09 May 2021 19:30) (95% - 97%)    Physical Exam:  General: WN/WD NAD  Neurology: A&Ox3, nonfocal, ALICEA x 4  Head:  Normocephalic, atraumatic  Respiratory: CTA B/L  CV: RRR, S1S2 audible  Abdominal:  bowel sounds present x 4, Soft, NT, ND no palpable mass  MSK:  + peripheral pulses, FROM all 4 extremity  Labs:      05-09    138  |  96  |  <4<L>  ----------------------------<  79  3.4<L>   |  24  |  0.87    Ca    9.5      09 May 2021 20:05  Phos  3.0     05-09  Mg     1.7     05-09      CARDIAC MARKERS ( 09 May 2021 20:05 )  x     / x     / 121 U/L / x     / 1.0 ng/mL          Assessment & Plan:  25M with no significant past medical history presents w/ nausea and vomiting x 1 month,    now c/o chest pain:    > STAT EKG: sinu bradycardia @ 58 bpm, new T wave inversion Lead V1, otherwise EKG unchanged from prior  > STAT cardiac enzymes  > Continue to monitor signs/symptoms  > F/u with attending  > Will endorse to day team    Maddie Hernandez PA-C (Medicine PA)  #[id number] Notified by RN pt with chest pain. Examined pt at bedside, who is accompanied by his mother.    Pt states he has been having similar chest pain intermittently since time of admission. Describes pain as a "knot" in midsternum. Rates pain as 5/10. Denies radiation of pain to left or right side. Denies SOB, palpitations, headache, leg pain, dizziness, lightheadedness. States he has had similar pain intermittently over the past month when he feels dehydrated. States pain is alleviated when he eats ice chips. Pain is not reproducible, does not change with movement, and is not pleuritic. Pt still with nausea/vomiting, which improves with Zofran.      Vital Signs Last 24 Hrs  T(C): 36.8 (09 May 2021 19:30), Max: 37.1 (09 May 2021 16:07)  T(F): 98.3 (09 May 2021 19:30), Max: 98.7 (09 May 2021 16:07)  HR: 57 (09 May 2021 19:30) (57 - 66)  BP: 145/77 (09 May 2021 19:30) (123/88 - 145/77)  BP(mean): --  RR: 18 (09 May 2021 19:30) (18 - 18)  SpO2: 97% (09 May 2021 19:30) (95% - 97%)    Physical Exam:  General: WN/WD NAD  Neurology: A&Ox3, nonfocal, ALICEA x 4  Head:  Normocephalic, atraumatic  Respiratory: CTA B/L  CV: RRR, S1S2 audible  Abdominal:  bowel sounds present x 4, Soft, NT, ND no palpable mass  MSK:  + peripheral pulses, FROM all 4 extremity  Labs:      05-09    138  |  96  |  <4<L>  ----------------------------<  79  3.4<L>   |  24  |  0.87    Ca    9.5      09 May 2021 20:05  Phos  3.0     05-09  Mg     1.7     05-09      CARDIAC MARKERS ( 09 May 2021 20:05 )  x     / x     / 121 U/L / x     / 1.0 ng/mL          Assessment & Plan:  25M with no significant past medical history presents w/ nausea and vomiting x 1 month,    now c/o chest pain:    > STAT EKG: sinus bradycardia @ 58 bpm, new T wave inversion Lead V1, otherwise EKG unchanged from prior. QTc<500ms  > STAT cardiac enzymes  > C/w Zofran prn  > Continue to monitor signs/symptoms  > F/u with attending  > Will endorse to day team    Maddie Hernandez PA-C (Medicine PA)  #[id number] Notified by RN pt with chest pain. Examined pt at bedside, who is accompanied by his mother.    Pt states he has been having similar chest pain intermittently since time of admission. Describes pain as a "knot" in midsternum. Rates pain as 5/10. Denies radiation of pain to left or right side. Denies SOB, palpitations, headache, leg pain, dizziness, lightheadedness. States he has had similar pain intermittently over the past month when he feels dehydrated. States pain is alleviated when he eats ice chips. Pain is not reproducible, does not change with movement, and is not pleuritic. Pt still with nausea/vomiting, which improves with Zofran.      Vital Signs Last 24 Hrs  T(C): 36.8 (09 May 2021 19:30), Max: 37.1 (09 May 2021 16:07)  T(F): 98.3 (09 May 2021 19:30), Max: 98.7 (09 May 2021 16:07)  HR: 57 (09 May 2021 19:30) (57 - 66)  BP: 145/77 (09 May 2021 19:30) (123/88 - 145/77)  BP(mean): --  RR: 18 (09 May 2021 19:30) (18 - 18)  SpO2: 97% (09 May 2021 19:30) (95% - 97%)    Physical Exam:  General: WN/WD NAD  Neurology: A&Ox3, nonfocal, ALICEA x 4  Head:  Normocephalic, atraumatic  Respiratory: CTA B/L  CV: RRR, S1S2 audible  Abdominal:  bowel sounds present x 4, Soft, NT, ND no palpable mass  MSK:  + peripheral pulses, FROM all 4 extremity  Labs:      05-09    138  |  96  |  <4<L>  ----------------------------<  79  3.4<L>   |  24  |  0.87    Ca    9.5      09 May 2021 20:05  Phos  3.0     05-09  Mg     1.7     05-09      CARDIAC MARKERS ( 09 May 2021 20:05 )  x     / x     / 121 U/L / x     / 1.0 ng/mL          Assessment & Plan:  25M with no significant past medical history presents w/ nausea and vomiting x 1 month,    now c/o chest pain:    > STAT EKG: sinus bradycardia @ 58 bpm, new T wave inversion Lead V1, otherwise EKG unchanged from prior. QTc<500ms  > STAT cardiac enzymes  > STAT BMP given h/o nausea/vomiting and electrolyte abnormalities  > C/w Zofran prn  > Continue to monitor signs/symptoms  > F/u with attending  > Will endorse to day team    Maddie Hernandez PA-C (Medicine PA)  #[id number] Notified by RN pt with chest pain. Examined pt at bedside, who is accompanied by his mother.    Pt states he has been having similar chest pain intermittently since time of admission. Describes pain as a "knot" in midsternum. Rates pain as 5/10. Denies radiation of pain to left or right side. Denies SOB, palpitations, headache, leg pain, dizziness, lightheadedness. States he has had similar pain intermittently over the past month when he feels dehydrated. States pain is alleviated when he eats ice chips. Pain is not reproducible, does not change with movement, and is not pleuritic. Pt still with nausea/vomiting, which improves with Zofran.      Vital Signs Last 24 Hrs  T(C): 36.8 (09 May 2021 19:30), Max: 37.1 (09 May 2021 16:07)  T(F): 98.3 (09 May 2021 19:30), Max: 98.7 (09 May 2021 16:07)  HR: 57 (09 May 2021 19:30) (57 - 66)  BP: 145/77 (09 May 2021 19:30) (123/88 - 145/77)  BP(mean): --  RR: 18 (09 May 2021 19:30) (18 - 18)  SpO2: 97% (09 May 2021 19:30) (95% - 97%)    Physical Exam:  General: WN/WD NAD  Neurology: A&Ox3, nonfocal, ALICEA x 4  Head:  Normocephalic, atraumatic  Respiratory: CTA B/L  CV: RRR, S1S2 audible  Abdominal:  bowel sounds present x 4, Soft, NT, ND no palpable mass  MSK:  + peripheral pulses, FROM all 4 extremity  Labs:      05-09    138  |  96  |  <4<L>  ----------------------------<  79  3.4<L>   |  24  |  0.87    Ca    9.5      09 May 2021 20:05  Phos  3.0     05-09  Mg     1.7     05-09      CARDIAC MARKERS ( 09 May 2021 20:05 )  x     / x     / 121 U/L / x     / 1.0 ng/mL          Assessment & Plan:  25M with no significant past medical history presents w/ nausea and vomiting x 1 month,    now c/o chest pain:    > STAT EKG: sinus bradycardia @ 58 bpm, new T wave inversion Lead V1, otherwise EKG unchanged from prior. QTc<500ms  > STAT cardiac enzymes  > STAT BMP given h/o nausea/vomiting and electrolyte abnormalities  > C/w lactated ringers  > C/w Zofran prn  > Continue to monitor signs/symptoms  > F/u with attending  > Will endorse to day team    Maddie Hernandez PA-C (Medicine PA)  #[id number]

## 2021-05-10 LAB
ANION GAP SERPL CALC-SCNC: 15 MMOL/L — SIGNIFICANT CHANGE UP (ref 5–17)
BUN SERPL-MCNC: <4 MG/DL — LOW (ref 7–23)
CALCIUM SERPL-MCNC: 9.2 MG/DL — SIGNIFICANT CHANGE UP (ref 8.4–10.5)
CHLORIDE SERPL-SCNC: 98 MMOL/L — SIGNIFICANT CHANGE UP (ref 96–108)
CK MB BLD-MCNC: <0.8 % — SIGNIFICANT CHANGE UP (ref 0–3.5)
CK MB CFR SERPL CALC: <1 NG/ML — SIGNIFICANT CHANGE UP (ref 0–6.7)
CK SERPL-CCNC: 122 U/L — SIGNIFICANT CHANGE UP (ref 30–200)
CO2 SERPL-SCNC: 23 MMOL/L — SIGNIFICANT CHANGE UP (ref 22–31)
CREAT SERPL-MCNC: 0.95 MG/DL — SIGNIFICANT CHANGE UP (ref 0.5–1.3)
GLUCOSE SERPL-MCNC: 77 MG/DL — SIGNIFICANT CHANGE UP (ref 70–99)
HCT VFR BLD CALC: 37.2 % — LOW (ref 39–50)
HGB BLD-MCNC: 12.6 G/DL — LOW (ref 13–17)
MAGNESIUM SERPL-MCNC: 1.9 MG/DL — SIGNIFICANT CHANGE UP (ref 1.6–2.6)
MCHC RBC-ENTMCNC: 28.3 PG — SIGNIFICANT CHANGE UP (ref 27–34)
MCHC RBC-ENTMCNC: 33.9 GM/DL — SIGNIFICANT CHANGE UP (ref 32–36)
MCV RBC AUTO: 83.4 FL — SIGNIFICANT CHANGE UP (ref 80–100)
NRBC # BLD: 0 /100 WBCS — SIGNIFICANT CHANGE UP (ref 0–0)
PHOSPHATE SERPL-MCNC: 3.1 MG/DL — SIGNIFICANT CHANGE UP (ref 2.5–4.5)
PLATELET # BLD AUTO: 275 K/UL — SIGNIFICANT CHANGE UP (ref 150–400)
POTASSIUM SERPL-MCNC: 3.6 MMOL/L — SIGNIFICANT CHANGE UP (ref 3.5–5.3)
POTASSIUM SERPL-SCNC: 3.6 MMOL/L — SIGNIFICANT CHANGE UP (ref 3.5–5.3)
RBC # BLD: 4.46 M/UL — SIGNIFICANT CHANGE UP (ref 4.2–5.8)
RBC # FLD: 12.7 % — SIGNIFICANT CHANGE UP (ref 10.3–14.5)
SODIUM SERPL-SCNC: 136 MMOL/L — SIGNIFICANT CHANGE UP (ref 135–145)
TROPONIN T, HIGH SENSITIVITY RESULT: 9 NG/L — SIGNIFICANT CHANGE UP (ref 0–51)
WBC # BLD: 5.13 K/UL — SIGNIFICANT CHANGE UP (ref 3.8–10.5)
WBC # FLD AUTO: 5.13 K/UL — SIGNIFICANT CHANGE UP (ref 3.8–10.5)

## 2021-05-10 PROCEDURE — 93010 ELECTROCARDIOGRAM REPORT: CPT

## 2021-05-10 PROCEDURE — 99232 SBSQ HOSP IP/OBS MODERATE 35: CPT

## 2021-05-10 PROCEDURE — 99233 SBSQ HOSP IP/OBS HIGH 50: CPT | Mod: GC

## 2021-05-10 RX ORDER — ACETAMINOPHEN 500 MG
650 TABLET ORAL ONCE
Refills: 0 | Status: COMPLETED | OUTPATIENT
Start: 2021-05-10 | End: 2021-05-10

## 2021-05-10 RX ADMIN — SODIUM CHLORIDE 100 MILLILITER(S): 9 INJECTION, SOLUTION INTRAVENOUS at 17:32

## 2021-05-10 RX ADMIN — Medication 650 MILLIGRAM(S): at 23:18

## 2021-05-10 RX ADMIN — Medication 12.5 MILLIGRAM(S): at 05:40

## 2021-05-10 RX ADMIN — Medication 650 MILLIGRAM(S): at 22:32

## 2021-05-10 RX ADMIN — SODIUM CHLORIDE 100 MILLILITER(S): 9 INJECTION, SOLUTION INTRAVENOUS at 05:40

## 2021-05-10 RX ADMIN — ONDANSETRON 4 MILLIGRAM(S): 8 TABLET, FILM COATED ORAL at 23:03

## 2021-05-10 RX ADMIN — Medication 12.5 MILLIGRAM(S): at 01:06

## 2021-05-10 RX ADMIN — Medication 12.5 MILLIGRAM(S): at 13:31

## 2021-05-10 RX ADMIN — Medication 12.5 MILLIGRAM(S): at 17:32

## 2021-05-10 RX ADMIN — Medication 12.5 MILLIGRAM(S): at 23:03

## 2021-05-10 RX ADMIN — Medication 40 MILLIEQUIVALENT(S): at 01:06

## 2021-05-10 RX ADMIN — PANTOPRAZOLE SODIUM 40 MILLIGRAM(S): 20 TABLET, DELAYED RELEASE ORAL at 05:40

## 2021-05-10 NOTE — PROGRESS NOTE ADULT - ATTENDING COMMENTS
Pt seen and examined. No acute events overnight. Reports some improvement in n/v. Last vomitted last night. Denies abd pain, fever/chills. Reports loose bms this morning. Seen by GI ; plan is for gastric emptying study. c/w CLD for now, Zofran prn.    Case d/w Dr June , Medicine ACP [facility] Pt seen and examined. No acute events overnight. Reports some improvement in n/v. Last episode of emesis was last night. Denies abd pain, fever/chills. Reports loose bms this morning. Seen by GI ; plan is for gastric emptying study. c/w CLD for now, Zofran prn.    Case d/w Dr June , Medicine ACP [facility]

## 2021-05-10 NOTE — PROGRESS NOTE ADULT - ASSESSMENT
Impression:  1. Nausea/vomiting/abdominal pain - differential includes hyperemesis cannabis, gastroparesis, cyclic vomiting syndrome    Recommend:  -Follow-up gastric emptying studying  -Continue Zofran standing for now  -Diet as tolerated for now  -If gastric emptying study is negative, then hyperemesis cannabis moves up in the differential; advised patient to avoid any cannabis as much as possible  -Can also consider brain imaging to rule out a central cause    Marcio So MD  GI iphone: [phone]   e-mail: [email or web address]

## 2021-05-10 NOTE — PROGRESS NOTE ADULT - SUBJECTIVE AND OBJECTIVE BOX
Chief Complaint:  Patient is a 25y old  Male who presents with a chief complaint of non-intractable nausea and vomiting (10 May 2021 11:24)      Interval Events: Feels nausea/vomiting is better, as he's able to tolerate liquids. Reports now he's around individuals with heavy marijuana smoking, and that taking hot showers make the nausea better.    Allergies:  No Known Allergies      Hospital Medications:  lactated ringers. 1000 milliLiter(s) IV Continuous <Continuous>  ondansetron Injectable 4 milliGRAM(s) IV Push every 6 hours PRN  pantoprazole    Tablet 40 milliGRAM(s) Oral before breakfast  polyethylene glycol 3350 17 Gram(s) Oral daily  promethazine 12.5 milliGRAM(s) Oral every 6 hours      PMHX/PSHX:  No pertinent past medical history    Obesity    No significant past surgical history    Status post fracture of left tibia        Family history:  FH: diabetes mellitus        ROS:     General:  No wt loss, fevers, chills, night sweats, fatigue,   Eyes:  Good vision, no reported pain  ENT:  No sore throat, pain, runny nose, dysphagia  CV:  No pain, palpitations, hypo/hypertension  Resp:  No dyspnea, cough, tachypnea, wheezing  GI:  See HPI  :  No pain, bleeding, incontinence, nocturia  Muscle:  No pain, weakness  Neuro:  No weakness, tingling, memory problems  Psych:  No fatigue, insomnia, mood problems, depression  Skin:  No rash, edema      PHYSICAL EXAM:   Vital Signs:  Vital Signs Last 24 Hrs  T(C): 36.7 (10 May 2021 09:42), Max: 37.1 (09 May 2021 16:07)  T(F): 98.1 (10 May 2021 09:42), Max: 98.7 (09 May 2021 16:07)  HR: 64 (10 May 2021 05:39) (52 - 64)  BP: 148/79 (10 May 2021 09:42) (115/73 - 148/79)  BP(mean): --  RR: 18 (10 May 2021 09:42) (18 - 18)  SpO2: 98% (10 May 2021 09:42) (97% - 98%)  Daily     Daily     GENERAL:  Appears stated age, well-groomed, well-nourished, no distress  HEENT:  NC/AT,  conjunctivae clear, sclera -anicteric  CHEST:  Full & symmetric excursion, no increased effort, breath sounds clear  HEART:  Regular rhythm, S1, S2, no murmur/rub/S3/S4,  no edema  ABDOMEN:  Obese; soft, non-tender, non-distended, normoactive bowel sounds  EXTREMITIES:  no cyanosis,clubbing or edema  SKIN:  No rash/erythema/ecchymoses/petechiae/wounds/abscess/warm/dry  NEURO:  Alert, oriented x 3    LABS:                        12.6   5.13  )-----------( 275      ( 10 May 2021 07:15 )             37.2     05-10    136  |  98  |  <4<L>  ----------------------------<  77  3.6   |  23  |  0.95    Ca    9.2      10 May 2021 07:13  Phos  3.1     05-10  Mg     1.9     05-10                Imaging:

## 2021-05-10 NOTE — CHART NOTE - NSCHARTNOTEFT_GEN_A_CORE
**incomplete note Notified by RN pt with headache. Examined pt at bedside.  Pt reports frontal b/l headache rated as 7/10. States headache started 1-2 hours ago. Denies visual disturbance, change in vision, blurry vision, extremity weakness, numbness, chest pain, incontinence, SOB, palpitations, leg pain. Reports he has been vomiting intermittently over the past 1-2 hours, which he has been doing intermittently since time of admission.      Vital Signs Last 24 Hrs  T(C): 37.2 (11 May 2021 00:20), Max: 37.2 (11 May 2021 00:20)  T(F): 98.9 (11 May 2021 00:20), Max: 98.9 (11 May 2021 00:20)  HR: 84 (11 May 2021 00:20) (52 - 84)  BP: 131/75 (11 May 2021 00:20) (115/73 - 148/79)  BP(mean): --  RR: 18 (11 May 2021 00:20) (18 - 18)  SpO2: 98% (11 May 2021 00:20) (95% - 98%)    Physical Exam:  General: WN/WD NAD  Neurology: A&Ox4, nonfocal, ALICEA x 4                   CN II-XII grossly intact, upper and lower extremities equal strength b/l, upper and lower extremities sensation intact.  PERRLA, EOMI  Head:  Normocephalic, atraumatic  Respiratory: CTA B/L  CV: RRR, S1S2  Abdominal: bowel sounds present x 4. Soft, NT, ND no palpable mass  MSK:  + peripheral pulses, FROM all 4 extremity      Labs:                          12.6   5.13  )-----------( 275      ( 10 May 2021 07:15 )             37.2     05-10    136  |  98  |  <4<L>  ----------------------------<  77  3.6   |  23  |  0.95    Ca    9.2      10 May 2021 07:13  Phos  3.1     05-10  Mg     1.9     05-10      CARDIAC MARKERS ( 10 May 2021 00:54 )  x     / x     / 122 U/L / x     / <1.0 ng/mL  CARDIAC MARKERS ( 09 May 2021 20:05 )  x     / x     / 121 U/L / x     / 1.0 ng/mL          Assessment & Plan:  25M with no significant past medical history presents w/ nausea and vomiting x 1 month,    now c/o headache:  > PO Acetaminophen 650 mg x 1  > Monitor headache s/p Tylenol  > C/w Zofran prn for nausea/vomiting  > Continue to monitor signs and symptoms  > Will endorse to day team    Maddie Hernandez PA-C (Medicine PA)  #[id number]

## 2021-05-10 NOTE — PROGRESS NOTE ADULT - ASSESSMENT
25M with no significant past medical history presents w/ nausea and vomiting        Non-intractable vomiting with nausea.  Plan: unclear etiology; ddx includes gastroparesis vs cannabinoid hyperemesis syndrome as all other potential causes have been ruled out  -EGD/colonoscopy records from [facility] reviewed (in chart): findings of gastritis, biopsy negative for H pylori; no other abnormalities  -CT abd negative for acute findings  -Utox positive for marijuana despite pt stating that he hasnt indulged in the past few months; states that he hangs around multiple people who smoke a lot; education provided  -Pending gastric emptying study  -Appreciate GI recs  -Cont zofran PRN nausea; add promethazine 12.5mg Q6H standing for now  -Repeat EKG to monitor QTC  -Monitor electrolytes and replete    Abdominal pain.  Plan: - workup as above, improved  - start pantoprazole due to reported acid reflux and abd pain  - Cont clear liquid diet.     Dehydration. Plan: c/w IVF - LR at 100 cc x 6 hrs  monitor sCr.    Hypokalemia/electrolyte derangements. Plan: Likely secondary to vomiting, repleted  EKG w/ T wave inversions, non specific; Trop neg x 2, no active cardiac symptoms.  Monitor daily and supplement    Prophylactic measure.  Plan: DVT ppx: scds.

## 2021-05-10 NOTE — PROGRESS NOTE ADULT - SUBJECTIVE AND OBJECTIVE BOX
[facility]  Marva Bradford MD  Pager (M-F, 8A-5P): [phone]  Other Times:  [phone]    Patient is a 25y old  Male who presents with a chief complaint of non-intractable nausea and vomiting (07 May 2021 09:33)      SUBJECTIVE / OVERNIGHT EVENTS: Pt seen and examined at bedside. Denies any acute complaints. Episodes of emesis improved. Able to tolerate Breakfast this Am. Voiding well and last bowel movement was yesterday.    MEDICATIONS  (STANDING):  lactated ringers. 1000 milliLiter(s) (100 mL/Hr) IV Continuous <Continuous>  pantoprazole    Tablet 40 milliGRAM(s) Oral before breakfast  polyethylene glycol 3350 17 Gram(s) Oral daily  promethazine 12.5 milliGRAM(s) Oral every 6 hours    MEDICATIONS  (PRN):  ondansetron Injectable 4 milliGRAM(s) IV Push every 6 hours PRN Nausea and/or Vomiting    Vital Signs Last 24 Hrs  T(C): 36.7 (10 May 2021 09:42), Max: 37.1 (09 May 2021 16:07)  T(F): 98.1 (10 May 2021 09:42), Max: 98.7 (09 May 2021 16:07)  HR: 64 (10 May 2021 05:39) (52 - 64)  BP: 148/79 (10 May 2021 09:42) (115/73 - 148/79)  BP(mean): --  RR: 18 (10 May 2021 09:42) (18 - 18)  SpO2: 98% (10 May 2021 09:42) (95% - 98%)    CONSTITUTIONAL: NAD, well-developed, well-groomed  EYES: PERRLA; conjunctiva and sclera clear  ENMT: Moist oral mucosa, no pharyngeal injection or exudates; normal dentition  NECK: Supple, no palpable masses; no thyromegaly  RESPIRATORY: Normal respiratory effort; lungs are clear to auscultation bilaterally  CARDIOVASCULAR: Regular rate and rhythm, normal S1 and S2, no murmur/rub/gallop; No lower extremity edema; Peripheral pulses are 2+ bilaterally  ABDOMEN: Nontender to palpation, normoactive bowel sounds, no rebound/guarding; No hepatosplenomegaly  MUSCULOSKELETAL:  no clubbing or cyanosis of digits; no joint swelling or tenderness to palpation  PSYCH: A+O to person, place, and time; affect appropriate  NEUROLOGY: CN 2-12 are intact and symmetric; no gross sensory deficits   SKIN: No rashes; no palpable lesions                            12.6   5.13  )-----------( 275      ( 10 May 2021 07:15 )             37.2     10 May 2021 07:13    136    |  98     |  <4     ----------------------------<  77     3.6     |  23     |  0.95     Ca    9.2        10 May 2021 07:13  Phos  3.1       10 May 2021 07:13  Mg     1.9       10 May 2021 07:13          CAPILLARY BLOOD GLUCOSE        CARDIAC MARKERS ( 10 May 2021 00:54 )  x     / x     / 122 U/L / x     / <1.0 ng/mL  CARDIAC MARKERS ( 09 May 2021 20:05 )  x     / x     / 121 U/L / x     / 1.0 ng/mL              CAPILLARY BLOOD GLUCOSE               Patient is a 25y old  Male who presents with a chief complaint of non-intractable nausea and vomiting (07 May 2021 09:33)      SUBJECTIVE / OVERNIGHT EVENTS: Pt seen and examined at bedside. Denies any acute complaints. Episodes of emesis improved. Able to tolerate Breakfast this Am. Voiding well and last bowel movement was yesterday.    MEDICATIONS  (STANDING):  lactated ringers. 1000 milliLiter(s) (100 mL/Hr) IV Continuous <Continuous>  pantoprazole    Tablet 40 milliGRAM(s) Oral before breakfast  polyethylene glycol 3350 17 Gram(s) Oral daily  promethazine 12.5 milliGRAM(s) Oral every 6 hours    MEDICATIONS  (PRN):  ondansetron Injectable 4 milliGRAM(s) IV Push every 6 hours PRN Nausea and/or Vomiting    Vital Signs Last 24 Hrs  T(C): 36.7 (10 May 2021 09:42), Max: 37.1 (09 May 2021 16:07)  T(F): 98.1 (10 May 2021 09:42), Max: 98.7 (09 May 2021 16:07)  HR: 64 (10 May 2021 05:39) (52 - 64)  BP: 148/79 (10 May 2021 09:42) (115/73 - 148/79)  BP(mean): --  RR: 18 (10 May 2021 09:42) (18 - 18)  SpO2: 98% (10 May 2021 09:42) (95% - 98%)    CONSTITUTIONAL: NAD, well-developed, well-groomed  EYES: PERRLA; conjunctiva and sclera clear  ENMT: Moist oral mucosa, no pharyngeal injection or exudates; normal dentition  NECK: Supple, no palpable masses; no thyromegaly  RESPIRATORY: Normal respiratory effort; lungs are clear to auscultation bilaterally  CARDIOVASCULAR: Regular rate and rhythm, normal S1 and S2, no murmur/rub/gallop; No lower extremity edema; Peripheral pulses are 2+ bilaterally  ABDOMEN: Nontender to palpation, normoactive bowel sounds, no rebound/guarding; No hepatosplenomegaly  MUSCULOSKELETAL:  no clubbing or cyanosis of digits; no joint swelling or tenderness to palpation  PSYCH: A+O to person, place, and time; affect appropriate  NEUROLOGY: CN 2-12 are intact and symmetric; no gross sensory deficits   SKIN: No rashes; no palpable lesions                            12.6   5.13  )-----------( 275      ( 10 May 2021 07:15 )             37.2     10 May 2021 07:13    136    |  98     |  <4     ----------------------------<  77     3.6     |  23     |  0.95     Ca    9.2        10 May 2021 07:13  Phos  3.1       10 May 2021 07:13  Mg     1.9       10 May 2021 07:13          CAPILLARY BLOOD GLUCOSE        CARDIAC MARKERS ( 10 May 2021 00:54 )  x     / x     / 122 U/L / x     / <1.0 ng/mL  CARDIAC MARKERS ( 09 May 2021 20:05 )  x     / x     / 121 U/L / x     / 1.0 ng/mL              CAPILLARY BLOOD GLUCOSE

## 2021-05-11 DIAGNOSIS — R11.2 NAUSEA WITH VOMITING, UNSPECIFIED: ICD-10-CM

## 2021-05-11 DIAGNOSIS — R10.84 GENERALIZED ABDOMINAL PAIN: ICD-10-CM

## 2021-05-11 LAB
ANION GAP SERPL CALC-SCNC: 19 MMOL/L — HIGH (ref 5–17)
BUN SERPL-MCNC: <4 MG/DL — LOW (ref 7–23)
CALCIUM SERPL-MCNC: 9.5 MG/DL — SIGNIFICANT CHANGE UP (ref 8.4–10.5)
CHLORIDE SERPL-SCNC: 99 MMOL/L — SIGNIFICANT CHANGE UP (ref 96–108)
CO2 SERPL-SCNC: 20 MMOL/L — LOW (ref 22–31)
CREAT SERPL-MCNC: 0.95 MG/DL — SIGNIFICANT CHANGE UP (ref 0.5–1.3)
GLUCOSE BLDC GLUCOMTR-MCNC: 77 MG/DL — SIGNIFICANT CHANGE UP (ref 70–99)
GLUCOSE SERPL-MCNC: 72 MG/DL — SIGNIFICANT CHANGE UP (ref 70–99)
HCT VFR BLD CALC: 37 % — LOW (ref 39–50)
HGB BLD-MCNC: 12.6 G/DL — LOW (ref 13–17)
MCHC RBC-ENTMCNC: 28.3 PG — SIGNIFICANT CHANGE UP (ref 27–34)
MCHC RBC-ENTMCNC: 34.1 GM/DL — SIGNIFICANT CHANGE UP (ref 32–36)
MCV RBC AUTO: 83.1 FL — SIGNIFICANT CHANGE UP (ref 80–100)
NRBC # BLD: 0 /100 WBCS — SIGNIFICANT CHANGE UP (ref 0–0)
PLATELET # BLD AUTO: 271 K/UL — SIGNIFICANT CHANGE UP (ref 150–400)
POTASSIUM SERPL-MCNC: 3.4 MMOL/L — LOW (ref 3.5–5.3)
POTASSIUM SERPL-SCNC: 3.4 MMOL/L — LOW (ref 3.5–5.3)
RBC # BLD: 4.45 M/UL — SIGNIFICANT CHANGE UP (ref 4.2–5.8)
RBC # FLD: 12.8 % — SIGNIFICANT CHANGE UP (ref 10.3–14.5)
SODIUM SERPL-SCNC: 138 MMOL/L — SIGNIFICANT CHANGE UP (ref 135–145)
WBC # BLD: 5.31 K/UL — SIGNIFICANT CHANGE UP (ref 3.8–10.5)
WBC # FLD AUTO: 5.31 K/UL — SIGNIFICANT CHANGE UP (ref 3.8–10.5)

## 2021-05-11 PROCEDURE — 78264 GASTRIC EMPTYING IMG STUDY: CPT | Mod: 26

## 2021-05-11 PROCEDURE — 99232 SBSQ HOSP IP/OBS MODERATE 35: CPT | Mod: GC

## 2021-05-11 PROCEDURE — 99233 SBSQ HOSP IP/OBS HIGH 50: CPT

## 2021-05-11 RX ORDER — POTASSIUM CHLORIDE 20 MEQ
40 PACKET (EA) ORAL EVERY 4 HOURS
Refills: 0 | Status: COMPLETED | OUTPATIENT
Start: 2021-05-11 | End: 2021-05-11

## 2021-05-11 RX ORDER — POTASSIUM CHLORIDE 20 MEQ
40 PACKET (EA) ORAL EVERY 4 HOURS
Refills: 0 | Status: COMPLETED | OUTPATIENT
Start: 2021-05-11 | End: 2021-05-12

## 2021-05-11 RX ADMIN — ONDANSETRON 4 MILLIGRAM(S): 8 TABLET, FILM COATED ORAL at 23:54

## 2021-05-11 RX ADMIN — Medication 40 MILLIEQUIVALENT(S): at 18:23

## 2021-05-11 RX ADMIN — Medication 12.5 MILLIGRAM(S): at 17:18

## 2021-05-11 RX ADMIN — Medication 40 MILLIEQUIVALENT(S): at 23:30

## 2021-05-11 RX ADMIN — Medication 40 MILLIEQUIVALENT(S): at 23:07

## 2021-05-11 RX ADMIN — SODIUM CHLORIDE 100 MILLILITER(S): 9 INJECTION, SOLUTION INTRAVENOUS at 18:23

## 2021-05-11 NOTE — PROGRESS NOTE ADULT - ASSESSMENT
26 y/o M with no significant PMH now presenting with 1 month of intractable nausea and vomiting. Currently admitted for further workup.    Impression:  #Nausea/Vomiting- Patient with 1 month history of abdominal cramping a/w persistent nausea/vomiting. Previous workup at OSH reportedly WNL. Current CT A/P normal with normal lipase level. Differential includes gastroparesis vs gastric outlet obstruction vs cyclic vomiting syndrome vs hyperemesis cannabis    Plan:  - f/u results of gastric emptying study  - if negative for delayed transit/gastroparesis, presentation more likely to be due to hyperemesis cannabis  - advance diet as tolerated  - c/w zofran PRN  - can consider brain imaging if   - IVF and supportive care per primary team    PLEASE AWAIT ATTENDING ATTESTATION BELOW    Cinthia Murguia MD  PGY-2, Internal Medicine  Pager: [phone] 24 y/o M with no significant PMH now presenting with 1 month of intractable nausea and vomiting. Currently admitted for further workup.    Impression:  #Nausea/Vomiting- Patient with 1 month history of abdominal cramping a/w persistent nausea/vomiting. Previous workup at OSH reportedly WNL. Current CT A/P normal with normal lipase level. Differential includes gastroparesis vs gastric outlet obstruction vs cyclic vomiting syndrome vs hyperemesis cannabis    Plan:  - f/u results of gastric emptying study  - if negative for delayed transit/gastroparesis, presentation more likely to be due to hyperemesis cannabis  - advance diet as tolerated  - c/w zofran PRN  - can consider brain imaging if   - IVF and supportive care per primary team      Cinthia Murguia MD  PGY-2, Internal Medicine  Pager: [phone]

## 2021-05-11 NOTE — PROGRESS NOTE ADULT - SUBJECTIVE AND OBJECTIVE BOX
Patient is a 25y old  Male who presents with a chief complaint of non-intractable nausea and vomiting (11 May 2021 10:07)      SUBJECTIVE / OVERNIGHT EVENTS:    MEDICATIONS  (STANDING):  lactated ringers. 1000 milliLiter(s) (100 mL/Hr) IV Continuous <Continuous>  pantoprazole    Tablet 40 milliGRAM(s) Oral before breakfast  polyethylene glycol 3350 17 Gram(s) Oral daily  promethazine 12.5 milliGRAM(s) Oral every 6 hours    MEDICATIONS  (PRN):  ondansetron Injectable 4 milliGRAM(s) IV Push every 6 hours PRN Nausea and/or Vomiting      Vital Signs Last 24 Hrs  T(C): 36.8 (11 May 2021 08:30), Max: 37.2 (11 May 2021 00:20)  T(F): 98.2 (11 May 2021 08:30), Max: 98.9 (11 May 2021 00:20)  HR: 54 (11 May 2021 08:30) (52 - 64)  BP: 119/76 (11 May 2021 08:30) (119/76 - 146/87)  BP(mean): --  RR: 18 (11 May 2021 08:30) (18 - 20)  SpO2: 96% (11 May 2021 08:30) (95% - 98%)  CAPILLARY BLOOD GLUCOSE      POCT Blood Glucose.: 77 mg/dL (11 May 2021 08:43)    I&O's Summary    10 May 2021 07:01  -  11 May 2021 07:00  --------------------------------------------------------  IN: 2280 mL / OUT: 0 mL / NET: 2280 mL    11 May 2021 07:01  -  11 May 2021 11:34  --------------------------------------------------------  IN: 0 mL / OUT: 0 mL / NET: 0 mL        PHYSICAL EXAM:  GENERAL: NAD, well-developed  HEAD:  Atraumatic, Normocephalic  EYES: EOMI, PERRLA, conjunctiva and sclera clear  NECK: Supple, No JVD  CHEST/LUNG: Clear to auscultation bilaterally; No wheeze  HEART: Regular rate and rhythm; No murmurs, rubs, or gallops  ABDOMEN: Soft, Nontender, Nondistended; Bowel sounds present  EXTREMITIES:  2+ Peripheral Pulses, No clubbing, cyanosis, or edema  PSYCH: AAOx3  NEUROLOGY: non-focal  SKIN: No rashes or lesions    LABS:                        12.6   5.31  )-----------( 271      ( 11 May 2021 07:28 )             37.0     05-11    138  |  99  |  <4<L>  ----------------------------<  72  3.4<L>   |  20<L>  |  0.95    Ca    9.5      11 May 2021 07:27  Phos  3.1     05-10  Mg     1.9     05-10        CARDIAC MARKERS ( 10 May 2021 00:54 )  x     / x     / 122 U/L / x     / <1.0 ng/mL  CARDIAC MARKERS ( 09 May 2021 20:05 )  x     / x     / 121 U/L / x     / 1.0 ng/mL          RADIOLOGY & ADDITIONAL TESTS:    Imaging Personally Reviewed:    Consultant(s) Notes Reviewed:      Care Discussed with Consultants/Other Providers:   Patient is a 25y old  Male who presents with a chief complaint of non-intractable nausea and vomiting (11 May 2021 10:07)      SUBJECTIVE / OVERNIGHT EVENTS:  Pt seen and examined. s/p gastric emptying study this AM. Pt states that last episode of vomiting was last night. Also had a loose bm last night. He denies nausea this am.    MEDICATIONS  (STANDING):  lactated ringers. 1000 milliLiter(s) (100 mL/Hr) IV Continuous <Continuous>  pantoprazole    Tablet 40 milliGRAM(s) Oral before breakfast  polyethylene glycol 3350 17 Gram(s) Oral daily  promethazine 12.5 milliGRAM(s) Oral every 6 hours    MEDICATIONS  (PRN):  ondansetron Injectable 4 milliGRAM(s) IV Push every 6 hours PRN Nausea and/or Vomiting      Vital Signs Last 24 Hrs  T(C): 36.8 (11 May 2021 08:30), Max: 37.2 (11 May 2021 00:20)  T(F): 98.2 (11 May 2021 08:30), Max: 98.9 (11 May 2021 00:20)  HR: 54 (11 May 2021 08:30) (52 - 64)  BP: 119/76 (11 May 2021 08:30) (119/76 - 146/87)  BP(mean): --  RR: 18 (11 May 2021 08:30) (18 - 20)  SpO2: 96% (11 May 2021 08:30) (95% - 98%)  CAPILLARY BLOOD GLUCOSE      POCT Blood Glucose.: 77 mg/dL (11 May 2021 08:43)    I&O's Summary    10 May 2021 07:01  -  11 May 2021 07:00  --------------------------------------------------------  IN: 2280 mL / OUT: 0 mL / NET: 2280 mL    11 May 2021 07:01  -  11 May 2021 11:34  --------------------------------------------------------  IN: 0 mL / OUT: 0 mL / NET: 0 mL        PHYSICAL EXAM:  GENERAL: NAD, obese, anicteric, afebrile  HEAD:  Atraumatic, Normocephalic  EYES: EOMI, PERRLA, conjunctiva and sclera clear  NECK: Supple, No JVD  CHEST/LUNG: Clear to auscultation bilaterally; No wheeze  HEART: Regular rate and rhythm; No murmurs, rubs, or gallops  ABDOMEN: Soft, Nontender, Nondistended; Bowel sounds present  EXTREMITIES:  2+ Peripheral Pulses, No clubbing, cyanosis, or edema  PSYCH: AAOx3  NEUROLOGY: non-focal  SKIN: No rashes or lesions    LABS:                        12.6   5.31  )-----------( 271      ( 11 May 2021 07:28 )             37.0     05-11    138  |  99  |  <4<L>  ----------------------------<  72  3.4<L>   |  20<L>  |  0.95    Ca    9.5      11 May 2021 07:27  Phos  3.1     05-10  Mg     1.9     05-10        CARDIAC MARKERS ( 10 May 2021 00:54 )  x     / x     / 122 U/L / x     / <1.0 ng/mL  CARDIAC MARKERS ( 09 May 2021 20:05 )  x     / x     / 121 U/L / x     / 1.0 ng/mL          RADIOLOGY & ADDITIONAL TESTS:    Imaging Personally Reviewed:  NM gastric emptying 5/11   Abnormal liquid gastric emptying scintigraphy, with minimal emptying, suggestive of gastroparesis, obstruction cannot be excluded.      Consultant(s) Notes Reviewed:  GI

## 2021-05-11 NOTE — PROGRESS NOTE ADULT - ATTENDING COMMENTS
Patient seen and examined. Agree with above. Gastric emptying resulted now showing +liquid phase gastric delay. Suspect nausea/vomiting due to some gastroparesis, +/- hyperemesis cannabis. Would advance diet to low residual diet, discussed with patient to eat small meals throughout the day. Advised patient to avoid any cannabis. If still nauseous, can give trial of erythromycin 250mg IV q8. Reviewed EGD/colonoscopy from OSH in chart - no obstructive lesions on EGD, poor prep for colonoscopy.

## 2021-05-11 NOTE — PROGRESS NOTE ADULT - SUBJECTIVE AND OBJECTIVE BOX
Chief Complaint:  Patient is a 25y old  Male who presents with a chief complaint of non-intractable nausea and vomiting (10 May 2021 13:29)    Interval Events:   No acute events overnight.  Patient reports that he had one episode of vomiting overnight. Endorsed 4 episodes of watery diarrhea over the past day. Overall states that his nausea/vomiting have been progressively improving. States that he continues to have occasional cramping sensation in his abdomen but the frequency of these episodes appear to be decreasing.   Went for gastric emptying study earlier this AM.     Allergies:  No Known Allergies    Hospital Medications:  lactated ringers. 1000 milliLiter(s) IV Continuous <Continuous>  ondansetron Injectable 4 milliGRAM(s) IV Push every 6 hours PRN  pantoprazole    Tablet 40 milliGRAM(s) Oral before breakfast  polyethylene glycol 3350 17 Gram(s) Oral daily  promethazine 12.5 milliGRAM(s) Oral every 6 hours    PMHX/PSHX:  No pertinent past medical history    Obesity  No significant past surgical history  Status post fracture of left tibia  Family history:  FH: diabetes mellitus    ROS:   General:  No fevers, chills, fatigue  ENT:  No sore throat, dysphagia  CV:  No pain, palpitations  Resp:  No dyspnea, cough  GI:  No pain, +nausea, +vomiting, +diarrhea, No constipation, No weight loss, No fever, No pruritis, No rectal bleeding, No tarry stools, No dysphagia  :  No pain, bleeding, incontinence, nocturia  Muscle:  No pain, weakness  Neuro:  No weakness, tingling, memory problems  Skin:  No rash    PHYSICAL EXAM:   Vital Signs:  Vital Signs Last 24 Hrs  T(C): 36.4 (11 May 2021 04:54), Max: 37.2 (11 May 2021 00:20)  T(F): 97.6 (11 May 2021 04:54), Max: 98.9 (11 May 2021 00:20)  HR: 55 (11 May 2021 04:54) (52 - 64)  BP: 123/78 (11 May 2021 04:54) (120/71 - 146/87)  BP(mean): --  RR: 20 (11 May 2021 04:54) (18 - 20)  SpO2: 98% (11 May 2021 04:54) (95% - 98%)  Daily       GENERAL: NAD  HEENT: NC/AT, EOMI  RESP: no increased WOB  ABDOMEN: Soft, ND, NT, BS+  EXTREMITIES: no LE edema  NEURO: A&Ox3, no asterixis    LABS:                        12.6   5.31  )-----------( 271      ( 11 May 2021 07:28 )             37.0     Mean Cell Volume: 83.1 fl (05-11-21 @ 07:28)    05-11    138  |  99  |  <4<L>  ----------------------------<  72  3.4<L>   |  20<L>  |  0.95    Ca    9.5      11 May 2021 07:27  Phos  3.1     05-10  Mg     1.9     05-10                 12.6   5.31  )-----------( 271      ( 11 May 2021 07:28 )             37.0                         12.6   5.13  )-----------( 275      ( 10 May 2021 07:15 )             37.2     Imaging:  < from: CT Abdomen and Pelvis w/ IV Cont (05.07.21 @ 01:25) >  FINDINGS:  LOWER CHEST: Within normal limits.    LIVER: Within normal limits.  BILE DUCTS: Normal caliber.  GALLBLADDER: Within normal limits.  SPLEEN: Within normal limits.  PANCREAS: Within normal limits.  ADRENALS: Within normal limits.  KIDNEYS/URETERS: Within normal limits.    BLADDER: Within normal limits.  REPRODUCTIVE ORGANS: Prostate within normal limits.    BOWEL: No bowel obstruction. Appendix is normal.  PERITONEUM: No ascites.  VESSELS: Normal caliber abdominal aorta.  RETROPERITONEUM/LYMPH NODES: No lymphadenopathy.  ABDOMINAL WALL: Within normal limits.  BONES: Within normal limits.    IMPRESSION:  No acute intra-abdominal pathology.    < end of copied text >

## 2021-05-11 NOTE — PROGRESS NOTE ADULT - PROBLEM SELECTOR PLAN 1
-CT abd negative for acute findings  -Utox positive for marijuana despite pt stating that he hasnt indulged in the past few months; states that he hangs around multiple people who smoke a lot; education provided  - gastric emptying study suspicious for gastroparesis ; f/up GI reccs  -Cont zofran PRN ; promethazine 12.5mg Q6H standing for now  -serial EKG to monitor QTC  -Monitor electrolytes and replete  - advance diet as tolerated -CT abd negative for acute findings  -Utox positive for marijuana despite pt stating that he hasnt indulged in the past few months; states that he hangs around multiple people who smoke a lot; education provided  - gastric emptying study suspicious for gastroparesis ; d/w GI ; advance diet ; small frequent meals  - if still having nausea can trial Erythromycin  - check Hb A1C  -Cont zofran PRN ; promethazine 12.5mg Q6H standing for now  -serial EKG to monitor QTC  -Monitor electrolytes and replete  - advance diet as tolerated

## 2021-05-12 LAB
A1C WITH ESTIMATED AVERAGE GLUCOSE RESULT: 5.4 % — SIGNIFICANT CHANGE UP (ref 4–5.6)
ANION GAP SERPL CALC-SCNC: 15 MMOL/L — SIGNIFICANT CHANGE UP (ref 5–17)
BUN SERPL-MCNC: <4 MG/DL — LOW (ref 7–23)
CALCIUM SERPL-MCNC: 9.5 MG/DL — SIGNIFICANT CHANGE UP (ref 8.4–10.5)
CHLORIDE SERPL-SCNC: 101 MMOL/L — SIGNIFICANT CHANGE UP (ref 96–108)
CO2 SERPL-SCNC: 22 MMOL/L — SIGNIFICANT CHANGE UP (ref 22–31)
CREAT SERPL-MCNC: 0.93 MG/DL — SIGNIFICANT CHANGE UP (ref 0.5–1.3)
ESTIMATED AVERAGE GLUCOSE: 108 MG/DL — SIGNIFICANT CHANGE UP (ref 68–114)
GLUCOSE BLDC GLUCOMTR-MCNC: 75 MG/DL — SIGNIFICANT CHANGE UP (ref 70–99)
GLUCOSE SERPL-MCNC: 71 MG/DL — SIGNIFICANT CHANGE UP (ref 70–99)
HCT VFR BLD CALC: 36.2 % — LOW (ref 39–50)
HGB BLD-MCNC: 12.4 G/DL — LOW (ref 13–17)
MAGNESIUM SERPL-MCNC: 1.7 MG/DL — SIGNIFICANT CHANGE UP (ref 1.6–2.6)
MCHC RBC-ENTMCNC: 28.8 PG — SIGNIFICANT CHANGE UP (ref 27–34)
MCHC RBC-ENTMCNC: 34.3 GM/DL — SIGNIFICANT CHANGE UP (ref 32–36)
MCV RBC AUTO: 84 FL — SIGNIFICANT CHANGE UP (ref 80–100)
NRBC # BLD: 0 /100 WBCS — SIGNIFICANT CHANGE UP (ref 0–0)
PHOSPHATE SERPL-MCNC: 3.2 MG/DL — SIGNIFICANT CHANGE UP (ref 2.5–4.5)
PLATELET # BLD AUTO: 280 K/UL — SIGNIFICANT CHANGE UP (ref 150–400)
POTASSIUM SERPL-MCNC: 4 MMOL/L — SIGNIFICANT CHANGE UP (ref 3.5–5.3)
POTASSIUM SERPL-SCNC: 4 MMOL/L — SIGNIFICANT CHANGE UP (ref 3.5–5.3)
RBC # BLD: 4.31 M/UL — SIGNIFICANT CHANGE UP (ref 4.2–5.8)
RBC # FLD: 12.9 % — SIGNIFICANT CHANGE UP (ref 10.3–14.5)
SODIUM SERPL-SCNC: 138 MMOL/L — SIGNIFICANT CHANGE UP (ref 135–145)
WBC # BLD: 5.71 K/UL — SIGNIFICANT CHANGE UP (ref 3.8–10.5)
WBC # FLD AUTO: 5.71 K/UL — SIGNIFICANT CHANGE UP (ref 3.8–10.5)

## 2021-05-12 PROCEDURE — 99232 SBSQ HOSP IP/OBS MODERATE 35: CPT | Mod: GC

## 2021-05-12 PROCEDURE — 78264 GASTRIC EMPTYING IMG STUDY: CPT | Mod: 26,GC

## 2021-05-12 RX ADMIN — Medication 12.5 MILLIGRAM(S): at 12:20

## 2021-05-12 RX ADMIN — Medication 12.5 MILLIGRAM(S): at 17:50

## 2021-05-12 RX ADMIN — Medication 12.5 MILLIGRAM(S): at 05:19

## 2021-05-12 RX ADMIN — Medication 12.5 MILLIGRAM(S): at 01:03

## 2021-05-12 RX ADMIN — PANTOPRAZOLE SODIUM 40 MILLIGRAM(S): 20 TABLET, DELAYED RELEASE ORAL at 12:20

## 2021-05-12 RX ADMIN — SODIUM CHLORIDE 100 MILLILITER(S): 9 INJECTION, SOLUTION INTRAVENOUS at 05:19

## 2021-05-12 NOTE — PROVIDER CONTACT NOTE (OTHER) - RECOMMENDATIONS
Notify CRESCENCIO Rodriguez patient vomiting after dose of potassium chloride oral and solution.
EKG and cardiac enzymes

## 2021-05-12 NOTE — PROVIDER CONTACT NOTE (OTHER) - ACTION/TREATMENT ORDERED:
CRESCENCIO Hernandez made aware.  EKG and labs done and sent. will continue to monitor.
CRESCENCIO Rodriguez patient vomiting after dose of potassium chloride oral and solution.  As per provider,  hold 2am dose of potassium chloride and re-assess potassium value with morning labs

## 2021-05-12 NOTE — PROGRESS NOTE ADULT - ATTENDING COMMENTS
Pt seen and examined. No acute events overnight. Last emesis was last night. He is s/p 2nd phase gastric emptying study ; Abnormal liquid phase gastric emptying study and however solid phase grossly normal. GI reccs to advance diet as tolerated, c/w zofran PRN. Labs noted ; A1C 5.4.  Will consider brain imaging if vomiting persists.  - c/w IVF and supportive care   - Patient advised on avoiding marijuana exposure in the future    Case d/w Dr June and ACP Alexi.

## 2021-05-12 NOTE — PROGRESS NOTE ADULT - SUBJECTIVE AND OBJECTIVE BOX
Chief Complaint:  Patient is a 25y old  Male who presents with a chief complaint of non-intractable nausea and vomiting (12 May 2021 12:28)    Interval Events:   Completed solid phase of gastric emptying study this AM.  States that he was able to tolerate the solid foods that he was given during the emptying study. Reports 1-2 episodes of vomiting overnight. Endorses feeling of stomach bloating associated with 4 episodes of watery, brown diarrhea that improves his symptoms. Otherwise, denies any dizziness, fever, CP, SOB or edema.    Allergies:  No Known Allergies    Hospital Medications:  lactated ringers. 1000 milliLiter(s) IV Continuous <Continuous>  ondansetron Injectable 4 milliGRAM(s) IV Push every 6 hours PRN  pantoprazole    Tablet 40 milliGRAM(s) Oral before breakfast  polyethylene glycol 3350 17 Gram(s) Oral daily  promethazine 12.5 milliGRAM(s) Oral every 6 hours    PMHX/PSHX:  No pertinent past medical history    Obesity    No significant past surgical history    Status post fracture of left tibia    Family history:  FH: diabetes mellitus    ROS:   General:  No fevers, chills, fatigue  ENT:  No sore throat, dysphagia  CV:  No pain, palpitations  Resp:  No dyspnea, cough  GI: +nausea, +vomiting, +diarrhea, No constipation, No weight loss, No fever, No pruritis, No rectal bleeding, No tarry stools, No dysphagia  :  No pain, bleeding, incontinence, nocturia  Muscle:  No pain, weakness  Neuro:  No weakness, tingling, memory problems  Skin:  No rash    PHYSICAL EXAM:   Vital Signs:  Vital Signs Last 24 Hrs  T(C): 36.7 (12 May 2021 00:15), Max: 36.7 (11 May 2021 20:50)  T(F): 98.1 (12 May 2021 00:15), Max: 98.1 (12 May 2021 00:15)  HR: 85 (12 May 2021 00:15) (60 - 85)  BP: 117/70 (12 May 2021 00:15) (117/70 - 123/76)  BP(mean): --  RR: 18 (12 May 2021 00:15) (18 - 20)  SpO2: 96% (12 May 2021 00:15) (96% - 97%)  Daily     Daily     GENERAL: NAD  HEENT: NC/AT,  EOMI  RESP: no increased WOB  ABDOMEN:  Soft, NT, ND, BS+  EXTREMITIES: no LE edema  NEURO:  A&Ox3, no asterixis    LABS:                        12.4   5.71  )-----------( 280      ( 12 May 2021 08:12 )             36.2     Mean Cell Volume: 84.0 fl (05-12-21 @ 08:12)    05-12    138  |  101  |  <4<L>  ----------------------------<  71  4.0   |  22  |  0.93    Ca    9.5      12 May 2021 08:12  Phos  3.2     05-12  Mg     1.7     05-12                       12.4   5.71  )-----------( 280      ( 12 May 2021 08:12 )             36.2                         12.6   5.31  )-----------( 271      ( 11 May 2021 07:28 )             37.0                         12.6   5.13  )-----------( 275      ( 10 May 2021 07:15 )             37.2     Imaging:  < from: NM Gastric Emptying Liquid (05.11.21 @ 09:00) >  IMPRESSION:  Abnormal liquid gastric emptying scintigraphy, with minimal emptying, suggestive of gastroparesis, obstruction cannot be excluded.    < end of copied text >  < from: NM Gastric Emptying Solid (05.12.21 @ 11:28) >    IMPRESSION: Normal, but limited, solid gastric emptying study, as the patient consumed only 75% of the standardized meal.    < end of copied text >

## 2021-05-12 NOTE — PROGRESS NOTE ADULT - SUBJECTIVE AND OBJECTIVE BOX
Patient is a 25y old  Male who presents with a chief complaint of non-intractable nausea and vomiting (11 May 2021 10:07)      SUBJECTIVE / OVERNIGHT EVENTS:  Pt seen and examined. s/p 2nd gastric emptying study this AM. Pt states that last episode of vomiting was last night after ingesting Potassium Pill supplementation.  Also had a loose bm last night. Denies fever, chills, chest pain, abdominal pain, rash,    MEDICATIONS  (STANDING):  lactated ringers. 1000 milliLiter(s) (100 mL/Hr) IV Continuous <Continuous>  pantoprazole    Tablet 40 milliGRAM(s) Oral before breakfast  polyethylene glycol 3350 17 Gram(s) Oral daily  promethazine 12.5 milliGRAM(s) Oral every 6 hours    MEDICATIONS  (PRN):  ondansetron Injectable 4 milliGRAM(s) IV Push every 6 hours PRN Nausea and/or Vomiting    Vital Signs Last 24 Hrs  T(C): 36.7 (12 May 2021 00:15), Max: 36.7 (11 May 2021 20:50)  T(F): 98.1 (12 May 2021 00:15), Max: 98.1 (12 May 2021 00:15)  HR: 85 (12 May 2021 00:15) (60 - 85)  BP: 117/70 (12 May 2021 00:15) (117/70 - 123/76)  BP(mean): --  RR: 18 (12 May 2021 00:15) (18 - 20)  SpO2: 96% (12 May 2021 00:15) (96% - 97%)    POCT Blood Glucose.: 77 mg/dL (11 May 2021 08:43)      05-11 @ 07:01  -  05-12 @ 07:00  --------------------------------------------------------  IN: 2940 mL / OUT: 600 mL / NET: 2340 mL        PHYSICAL EXAM:  GENERAL: NAD, obese, anicteric, afebrile  HEAD:  Atraumatic, Normocephalic  EYES: EOMI, PERRLA, conjunctiva and sclera clear  NECK: Supple, No JVD  CHEST/LUNG: Clear to auscultation bilaterally; No wheeze  HEART: Regular rate and rhythm; No murmurs, rubs, or gallops  ABDOMEN: Soft, Nontender, Nondistended; Bowel sounds present  EXTREMITIES:  2+ Peripheral Pulses, No clubbing, cyanosis, or edema  PSYCH: AAOx3  NEUROLOGY: non-focal  SKIN: No rashes or lesions  RADIOLOGY & ADDITIONAL TESTS:               12.4   5.71   )----------(  280       ( 12 May 2021 08:12 )               36.2      138    |  101    |  <4     ----------------------------<  71         ( 12 May 2021 08:12 )  4.0     |  22     |  0.93     Ca    9.5        ( 12 May 2021 08:12 )  Phos  3.2       ( 12 May 2021 08:12 )  Mg     1.7       ( 12 May 2021 08:12 )            CAPILLARY BLOOD GLUCOSE            Imaging Personally Reviewed:  NM gastric emptying 5/11   Abnormal liquid gastric emptying scintigraphy, with minimal emptying, suggestive of gastroparesis, obstruction cannot be excluded.      Consultant(s) Notes Reviewed:  GI

## 2021-05-12 NOTE — PROGRESS NOTE ADULT - ASSESSMENT
24 y/o M with no significant PMH now presenting with 1 month of intractable nausea and vomiting. Currently admitted for further workup.    Impression:  #Nausea/Vomiting- Patient with 1 month history of abdominal cramping a/w persistent nausea/vomiting. Previous workup at OSH reportedly WNL. Current CT A/P normal with normal lipase level. Differential includes gastroparesis vs gastric outlet obstruction vs cyclic vomiting syndrome vs hyperemesis cannabis. Patient overall improving.    Plan:  - abnormal liquid phase gastric emptying study and however solid phase grossly normal  - advance diet as tolerated, patient tolerated solids during emptying study  - c/w zofran PRN  - can consider brain imaging if vomiting persists  - monitor stool counts, low likelihood for infectious cause of diarrhea at this time as patient with no fever or leukocytosis  - IVF and supportive care per primary team  - Patient advised on avoiding marijuana exposure in the future    Cinthia Murguia MD  PGY-2, Internal Medicine  Pager: [phone] 26 y/o M with no significant PMH now presenting with 1 month of intractable nausea and vomiting. Currently admitted for further workup.    Impression:  #Nausea/Vomiting- Patient with 1 month history of abdominal cramping a/w persistent nausea/vomiting. Previous workup at OSH reportedly WNL. Current CT A/P normal with normal lipase level. Differential includes gastroparesis vs gastric outlet obstruction vs cyclic vomiting syndrome vs hyperemesis cannabis. Patient overall improving.    Plan:  - abnormal liquid phase gastric emptying study and however solid phase grossly normal  - advance diet as tolerated, patient tolerated solids during emptying study  - c/w zofran PRN  - can consider brain imaging if vomiting persists  - monitor stool counts, low likelihood for infectious cause of diarrhea at this time as patient with no fever or leukocytosis. Consider C.Diff and GI PCR testing if diarrhea does not resolve  - IVF and supportive care per primary team  - Patient advised on avoiding marijuana exposure in the future    Cinthia Murguia MD  PGY-2, Internal Medicine  Pager: [phone]

## 2021-05-12 NOTE — PROVIDER CONTACT NOTE (OTHER) - ASSESSMENT
Patient A&Ox 4,  VSS, denies pain, SOB or dizziness.  Patient is NPO, vomited 600cc after dose of potassium chloride 40 milliequivalent oral and again after dose of potassium chloride 40 milliequivalent oral tablets.
pt a&ox4. pt c/o of chest pain. bp 145/77 HR 57. temp of 98.3 and saturating 97% on room air.

## 2021-05-12 NOTE — PROGRESS NOTE ADULT - PROBLEM SELECTOR PLAN 1
-CT abd negative for acute findings  -Utox positive for marijuana despite pt stating that he hasnt indulged in the past few months; states that he hangs around multiple people who smoke a lot; education provided  - gastric emptying study suspicious for gastroparesis ; d/w GI ; advance diet ; small frequent meals  - if still having nausea can trial Erythromycin  - check Hb A1C  -Cont zofran PRN ; promethazine 12.5mg Q6H standing for now  -serial EKG to monitor QTC  -Monitor electrolytes and replete  - advance diet as tolerated

## 2021-05-12 NOTE — PROGRESS NOTE ADULT - ATTENDING COMMENTS
Agree with above. Solid phase of gastric emptying is unremarkable. Suspect more likely hyperemesis cannabis. Would advance diet as tolerated to low residual, small meals throughout the day. Avoid marijuana. If nausea persists, consider brain imaging.

## 2021-05-13 ENCOUNTER — TRANSCRIPTION ENCOUNTER (OUTPATIENT)
Age: 26
End: 2021-05-13

## 2021-05-13 VITALS
SYSTOLIC BLOOD PRESSURE: 114 MMHG | RESPIRATION RATE: 18 BRPM | DIASTOLIC BLOOD PRESSURE: 69 MMHG | OXYGEN SATURATION: 98 % | HEART RATE: 69 BPM | TEMPERATURE: 98 F

## 2021-05-13 LAB — SARS-COV-2 RNA SPEC QL NAA+PROBE: SIGNIFICANT CHANGE UP

## 2021-05-13 PROCEDURE — 83690 ASSAY OF LIPASE: CPT

## 2021-05-13 PROCEDURE — 83735 ASSAY OF MAGNESIUM: CPT

## 2021-05-13 PROCEDURE — 99239 HOSP IP/OBS DSCHRG MGMT >30: CPT | Mod: GC

## 2021-05-13 PROCEDURE — A9541: CPT

## 2021-05-13 PROCEDURE — 93308 TTE F-UP OR LMTD: CPT

## 2021-05-13 PROCEDURE — 81001 URINALYSIS AUTO W/SCOPE: CPT

## 2021-05-13 PROCEDURE — 85025 COMPLETE CBC W/AUTO DIFF WBC: CPT

## 2021-05-13 PROCEDURE — 96375 TX/PRO/DX INJ NEW DRUG ADDON: CPT

## 2021-05-13 PROCEDURE — 87635 SARS-COV-2 COVID-19 AMP PRB: CPT

## 2021-05-13 PROCEDURE — 86769 SARS-COV-2 COVID-19 ANTIBODY: CPT

## 2021-05-13 PROCEDURE — 85014 HEMATOCRIT: CPT

## 2021-05-13 PROCEDURE — 83036 HEMOGLOBIN GLYCOSYLATED A1C: CPT

## 2021-05-13 PROCEDURE — 82947 ASSAY GLUCOSE BLOOD QUANT: CPT

## 2021-05-13 PROCEDURE — 99285 EMERGENCY DEPT VISIT HI MDM: CPT

## 2021-05-13 PROCEDURE — 82962 GLUCOSE BLOOD TEST: CPT

## 2021-05-13 PROCEDURE — 82565 ASSAY OF CREATININE: CPT

## 2021-05-13 PROCEDURE — 71045 X-RAY EXAM CHEST 1 VIEW: CPT

## 2021-05-13 PROCEDURE — 74177 CT ABD & PELVIS W/CONTRAST: CPT

## 2021-05-13 PROCEDURE — 82803 BLOOD GASES ANY COMBINATION: CPT

## 2021-05-13 PROCEDURE — 80053 COMPREHEN METABOLIC PANEL: CPT

## 2021-05-13 PROCEDURE — 84132 ASSAY OF SERUM POTASSIUM: CPT

## 2021-05-13 PROCEDURE — 80048 BASIC METABOLIC PNL TOTAL CA: CPT

## 2021-05-13 PROCEDURE — 85018 HEMOGLOBIN: CPT

## 2021-05-13 PROCEDURE — 84100 ASSAY OF PHOSPHORUS: CPT

## 2021-05-13 PROCEDURE — 78264 GASTRIC EMPTYING IMG STUDY: CPT

## 2021-05-13 PROCEDURE — 83605 ASSAY OF LACTIC ACID: CPT

## 2021-05-13 PROCEDURE — 96374 THER/PROPH/DIAG INJ IV PUSH: CPT

## 2021-05-13 PROCEDURE — 82550 ASSAY OF CK (CPK): CPT

## 2021-05-13 PROCEDURE — 93005 ELECTROCARDIOGRAM TRACING: CPT

## 2021-05-13 PROCEDURE — 85027 COMPLETE CBC AUTOMATED: CPT

## 2021-05-13 PROCEDURE — U0003: CPT

## 2021-05-13 PROCEDURE — 82553 CREATINE MB FRACTION: CPT

## 2021-05-13 PROCEDURE — 80307 DRUG TEST PRSMV CHEM ANLYZR: CPT

## 2021-05-13 PROCEDURE — 84295 ASSAY OF SERUM SODIUM: CPT

## 2021-05-13 PROCEDURE — 82435 ASSAY OF BLOOD CHLORIDE: CPT

## 2021-05-13 PROCEDURE — U0005: CPT

## 2021-05-13 PROCEDURE — 84484 ASSAY OF TROPONIN QUANT: CPT

## 2021-05-13 PROCEDURE — 82330 ASSAY OF CALCIUM: CPT

## 2021-05-13 RX ADMIN — Medication 12.5 MILLIGRAM(S): at 12:53

## 2021-05-13 RX ADMIN — SODIUM CHLORIDE 100 MILLILITER(S): 9 INJECTION, SOLUTION INTRAVENOUS at 06:12

## 2021-05-13 RX ADMIN — PANTOPRAZOLE SODIUM 40 MILLIGRAM(S): 20 TABLET, DELAYED RELEASE ORAL at 06:09

## 2021-05-13 RX ADMIN — Medication 12.5 MILLIGRAM(S): at 06:08

## 2021-05-13 RX ADMIN — Medication 12.5 MILLIGRAM(S): at 00:42

## 2021-05-13 NOTE — PROGRESS NOTE ADULT - TIME BILLING
case complexity and discharge planning. Pt is clinically stable for discharge. He is to f/up with PCP.    Case d/w Dr June and MEGHAN Adorno.

## 2021-05-13 NOTE — DISCHARGE NOTE NURSING/CASE MANAGEMENT/SOCIAL WORK - PATIENT PORTAL LINK FT
You can access the FollowMyHealth Patient Portal offered by [facility] by registering at the following website: [email or web address]. By joining [facility]’s FollowMyHealth portal, you will also be able to view your health information using other applications (apps) compatible with our system.

## 2021-05-13 NOTE — PROGRESS NOTE ADULT - PROBLEM SELECTOR PLAN 1
-CT abd negative for acute findings  -Utox positive for marijuana despite pt stating that he hasnt indulged in the past few months; states that he hangs around multiple people who smoke a lot; education provided  - gastric emptying series completed. 2nd test wnl tolerated solids.. Suspect patient symptoms is likely due to Hyperemesis cannabis syndrome   - if still having nausea can trial Erythromycin  - check Hb A1C  -Cont zofran PRN ; promethazine 12.5mg Q6H standing for now  -serial EKG to monitor QTC  -Monitor electrolytes and replete  - advance diet as tolerated

## 2021-05-13 NOTE — DISCHARGE NOTE PROVIDER - HOSPITAL COURSE
25M with no significant past medical history presents w/ nausea and vomiting   CT abd negative for acute findings. Utox positive for marijuana despite pt stating that he hasnt indulged in the past few months; states that he hangs around multiple people who smoke a lot; education provided. Gastric emptying study suspicious for gastroparesis ; d/w GI ; advance diet ; small frequent meals. Gastric emptying study: Abnormal liquid phase gastric emptying study and however solid phase grossly normal.  Advance diet as tolerated. Tolerated Regular diet. Outpatient follow up with PMD.

## 2021-05-13 NOTE — PROGRESS NOTE ADULT - REASON FOR ADMISSION
non-intractable nausea and vomiting

## 2021-05-13 NOTE — PROGRESS NOTE ADULT - SUBJECTIVE AND OBJECTIVE BOX
Patient is a 25y old  Male who presents with a chief complaint of non-intractable nausea and vomiting (11 May 2021 10:07)      SUBJECTIVE / OVERNIGHT EVENTS:  Pt seen and examined. Patient denies any acute complaints. States he feels much better. Has yet to have breakfast.        MEDICATIONS  (STANDING):  lactated ringers. 1000 milliLiter(s) (100 mL/Hr) IV Continuous <Continuous>  pantoprazole    Tablet 40 milliGRAM(s) Oral before breakfast  polyethylene glycol 3350 17 Gram(s) Oral daily  promethazine 12.5 milliGRAM(s) Oral every 6 hours    MEDICATIONS  (PRN):  ondansetron Injectable 4 milliGRAM(s) IV Push every 6 hours PRN Nausea and/or Vomiting        Vital Signs Last 24 Hrs  T(C): 37.3 (13 May 2021 00:22), Max: 37.3 (13 May 2021 00:22)  T(F): 99.1 (13 May 2021 00:22), Max: 99.1 (13 May 2021 00:22)  HR: 70 (13 May 2021 00:22) (70 - 76)  BP: 122/76 (13 May 2021 00:22) (122/72 - 126/83)  BP(mean): --  RR: 18 (13 May 2021 00:22) (18 - 18)  SpO2: 97% (13 May 2021 00:22) (97% - 100%)  POCT Blood Glucose.: 77 mg/dL (11 May 2021 08:43)      05-11 @ 07:01  -  05-12 @ 07:00  --------------------------------------------------------  IN: 2940 mL / OUT: 600 mL / NET: 2340 mL        PHYSICAL EXAM:  GENERAL: NAD, obese, anicteric, afebrile  HEAD:  Atraumatic, Normocephalic  EYES: EOMI, PERRLA, conjunctiva and sclera clear  NECK: Supple, No JVD  CHEST/LUNG: Clear to auscultation bilaterally; No wheeze  HEART: Regular rate and rhythm; No murmurs, rubs, or gallops  ABDOMEN: Soft, Nontender, Nondistended; Bowel sounds present  EXTREMITIES:  2+ Peripheral Pulses, No clubbing, cyanosis, or edema  PSYCH: AAOx3  NEUROLOGY: non-focal  SKIN: No rashes or lesions  RADIOLOGY & ADDITIONAL TESTS:                         CAPILLARY BLOOD GLUCOSE                CAPILLARY BLOOD GLUCOSE            Imaging Personally Reviewed:  NM gastric emptying 5/11   Abnormal liquid gastric emptying scintigraphy, with minimal emptying, suggestive of gastroparesis, obstruction cannot be excluded.      Consultant(s) Notes Reviewed:  GI

## 2021-05-13 NOTE — PROGRESS NOTE ADULT - PROBLEM SELECTOR PLAN 2
- Likely secondary to vomiting, repleted  - EKG w/nonspecific  T wave inversions on admission ; Trop neg x 2, no active cardiac symptoms.  - Monitor daily and supplement

## 2021-05-13 NOTE — PROGRESS NOTE ADULT - PROBLEM SELECTOR PLAN 4
- s/p IV hydration  - tolerating CLD  - monitor electrolytes

## 2021-05-13 NOTE — PROGRESS NOTE ADULT - ATTENDING COMMENTS
Pt seen and examined. No acute events overnight. Last emesis was last night.  s/p  gastric emptying study ; Abnormal liquid phase gastric emptying study and however solid phase grossly normal. Yet to eat breakfast ;  if tolerating will discharge home today. can c/w zofran PRN. IV fluids d/kelli. Patient advised on avoiding marijuana exposure in the future.

## 2021-05-13 NOTE — PROGRESS NOTE ADULT - PROVIDER SPECIALTY LIST ADULT
Gastroenterology
Hospitalist
Hospitalist
Gastroenterology
Hospitalist
Hospitalist
Gastroenterology
Hospitalist
Hospitalist

## 2021-05-13 NOTE — PROGRESS NOTE ADULT - PROBLEM SELECTOR PLAN 3
workup as above  - abd pain much improved  - c/w pantoprazole   - on clear liquid diet; advance as tolerated

## 2021-05-13 NOTE — DISCHARGE NOTE PROVIDER - CARE PROVIDER_API CALL
Trina Cameron  Internal Medicine  [address] [place] [address]  Phone: [phone]  Fax: [phone]  Follow Up Time:

## 2021-05-13 NOTE — DISCHARGE NOTE PROVIDER - NSDCMRMEDTOKEN_GEN_ALL_CORE_FT
Pepcid:   Zofran 8 mg oral tablet: 1 tab(s) orally , As Needed   Pepcid 20 mg oral tablet: orally once a day  Zofran 8 mg oral tablet: 1 tab(s) orally every 8 hours, As Needed

## 2021-05-13 NOTE — DISCHARGE NOTE PROVIDER - NSDCCPCAREPLAN_GEN_ALL_CORE_FT
PRINCIPAL DISCHARGE DIAGNOSIS  Diagnosis: Intractable nausea and vomiting  Assessment and Plan of Treatment: Follow up with your Primary care doctor in 1 week  Eat small frequent meals      SECONDARY DISCHARGE DIAGNOSES  Diagnosis: Dehydration  Assessment and Plan of Treatment: Received IV hydration    Diagnosis: Hypokalemia  Assessment and Plan of Treatment: Resolved    Diagnosis: Abdominal pain  Assessment and Plan of Treatment: Improved     PRINCIPAL DISCHARGE DIAGNOSIS  Diagnosis: Intractable nausea and vomiting  Assessment and Plan of Treatment: Follow up with your Primary care doctor in 1 week  Eat small frequent meals  Please refrain from smoking marijuana to prevent further episodes      SECONDARY DISCHARGE DIAGNOSES  Diagnosis: Dehydration  Assessment and Plan of Treatment: Received IV hydration    Diagnosis: Hypokalemia  Assessment and Plan of Treatment: Resolved    Diagnosis: Abdominal pain  Assessment and Plan of Treatment: Improved

## 2021-05-19 ENCOUNTER — EMERGENCY (EMERGENCY)
Facility: HOSPITAL | Age: 26
LOS: 1 days | Discharge: HOME | End: 2021-05-19
Attending: EMERGENCY MEDICINE
Payer: COMMERCIAL

## 2021-05-19 VITALS
SYSTOLIC BLOOD PRESSURE: 135 MMHG | DIASTOLIC BLOOD PRESSURE: 83 MMHG | OXYGEN SATURATION: 97 % | HEART RATE: 92 BPM | TEMPERATURE: 98 F | RESPIRATION RATE: 18 BRPM | WEIGHT: 270.07 LBS

## 2021-05-19 DIAGNOSIS — Z87.81 PERSONAL HISTORY OF (HEALED) TRAUMATIC FRACTURE: Chronic | ICD-10-CM

## 2021-05-19 PROCEDURE — 87635 SARS-COV-2 COVID-19 AMP PRB: CPT

## 2021-05-19 PROCEDURE — 80053 COMPREHEN METABOLIC PANEL: CPT

## 2021-05-19 PROCEDURE — 99284 EMERGENCY DEPT VISIT MOD MDM: CPT | Mod: 25

## 2021-05-19 PROCEDURE — 85025 COMPLETE CBC W/AUTO DIFF WBC: CPT

## 2021-05-19 PROCEDURE — 96375 TX/PRO/DX INJ NEW DRUG ADDON: CPT

## 2021-05-19 PROCEDURE — 83690 ASSAY OF LIPASE: CPT

## 2021-05-19 PROCEDURE — 96374 THER/PROPH/DIAG INJ IV PUSH: CPT

## 2021-05-19 PROCEDURE — 99284 EMERGENCY DEPT VISIT MOD MDM: CPT

## 2021-05-19 RX ORDER — SUCRALFATE 1 G
1 TABLET ORAL ONCE
Refills: 0 | Status: COMPLETED | OUTPATIENT
Start: 2021-05-19 | End: 2021-05-19

## 2021-05-19 RX ORDER — METOCLOPRAMIDE HCL 10 MG
10 TABLET ORAL ONCE
Refills: 0 | Status: COMPLETED | OUTPATIENT
Start: 2021-05-19 | End: 2021-05-19

## 2021-05-19 RX ORDER — SODIUM CHLORIDE 9 MG/ML
1000 INJECTION, SOLUTION INTRAVENOUS ONCE
Refills: 0 | Status: COMPLETED | OUTPATIENT
Start: 2021-05-19 | End: 2021-05-19

## 2021-05-19 RX ORDER — FAMOTIDINE 10 MG/ML
20 INJECTION INTRAVENOUS ONCE
Refills: 0 | Status: COMPLETED | OUTPATIENT
Start: 2021-05-19 | End: 2021-05-19

## 2021-05-19 RX ADMIN — SODIUM CHLORIDE 1000 MILLILITER(S): 9 INJECTION, SOLUTION INTRAVENOUS at 23:49

## 2021-05-19 RX ADMIN — FAMOTIDINE 20 MILLIGRAM(S): 10 INJECTION INTRAVENOUS at 23:48

## 2021-05-19 RX ADMIN — Medication 10 MILLIGRAM(S): at 23:49

## 2021-05-19 NOTE — ED ADULT NURSE NOTE - OBJECTIVE STATEMENT
25y M arrived to the ED c/o abd pain. Pt recently d/c from hospital May 13th s/p intractable n/v now presenting with n/v x1 day, abd pain and blood in vomit and stool. Pt reports he has been taking prescribed medication without relief of vomiting. Pt denies chest pain, SOB, HA, urinary symptoms, hematuria, weakness, dizziness, numbness, tinging. Peripheral pulses present b/l. Skin warm, dry and pink. Pt placed in position of comfort. Pt educated on call bell system and provided call bell. Bed in lowest position, wheels locked, appropriate side rails raised. Pt denies needs at this time.

## 2021-05-20 VITALS
TEMPERATURE: 99 F | OXYGEN SATURATION: 97 % | DIASTOLIC BLOOD PRESSURE: 89 MMHG | SYSTOLIC BLOOD PRESSURE: 150 MMHG | RESPIRATION RATE: 18 BRPM | HEART RATE: 80 BPM

## 2021-05-20 LAB
ALBUMIN SERPL ELPH-MCNC: 5.4 G/DL — HIGH (ref 3.3–5)
ALP SERPL-CCNC: 89 U/L — SIGNIFICANT CHANGE UP (ref 40–120)
ALT FLD-CCNC: 92 U/L — HIGH (ref 10–45)
ANION GAP SERPL CALC-SCNC: 21 MMOL/L — HIGH (ref 5–17)
AST SERPL-CCNC: 64 U/L — HIGH (ref 10–40)
BASOPHILS # BLD AUTO: 0.03 K/UL — SIGNIFICANT CHANGE UP (ref 0–0.2)
BASOPHILS NFR BLD AUTO: 0.4 % — SIGNIFICANT CHANGE UP (ref 0–2)
BILIRUB SERPL-MCNC: 0.9 MG/DL — SIGNIFICANT CHANGE UP (ref 0.2–1.2)
BUN SERPL-MCNC: 6 MG/DL — LOW (ref 7–23)
CALCIUM SERPL-MCNC: 11.2 MG/DL — HIGH (ref 8.4–10.5)
CHLORIDE SERPL-SCNC: 94 MMOL/L — LOW (ref 96–108)
CO2 SERPL-SCNC: 22 MMOL/L — SIGNIFICANT CHANGE UP (ref 22–31)
CREAT SERPL-MCNC: 0.97 MG/DL — SIGNIFICANT CHANGE UP (ref 0.5–1.3)
EOSINOPHIL # BLD AUTO: 0.05 K/UL — SIGNIFICANT CHANGE UP (ref 0–0.5)
EOSINOPHIL NFR BLD AUTO: 0.6 % — SIGNIFICANT CHANGE UP (ref 0–6)
GLUCOSE SERPL-MCNC: 98 MG/DL — SIGNIFICANT CHANGE UP (ref 70–99)
HCT VFR BLD CALC: 47.8 % — SIGNIFICANT CHANGE UP (ref 39–50)
HGB BLD-MCNC: 16.5 G/DL — SIGNIFICANT CHANGE UP (ref 13–17)
IMM GRANULOCYTES NFR BLD AUTO: 0.1 % — SIGNIFICANT CHANGE UP (ref 0–1.5)
LIDOCAIN IGE QN: 24 U/L — SIGNIFICANT CHANGE UP (ref 7–60)
LYMPHOCYTES # BLD AUTO: 1.34 K/UL — SIGNIFICANT CHANGE UP (ref 1–3.3)
LYMPHOCYTES # BLD AUTO: 15.9 % — SIGNIFICANT CHANGE UP (ref 13–44)
MCHC RBC-ENTMCNC: 28.6 PG — SIGNIFICANT CHANGE UP (ref 27–34)
MCHC RBC-ENTMCNC: 34.5 GM/DL — SIGNIFICANT CHANGE UP (ref 32–36)
MCV RBC AUTO: 82.8 FL — SIGNIFICANT CHANGE UP (ref 80–100)
MONOCYTES # BLD AUTO: 0.79 K/UL — SIGNIFICANT CHANGE UP (ref 0–0.9)
MONOCYTES NFR BLD AUTO: 9.4 % — SIGNIFICANT CHANGE UP (ref 2–14)
NEUTROPHILS # BLD AUTO: 6.2 K/UL — SIGNIFICANT CHANGE UP (ref 1.8–7.4)
NEUTROPHILS NFR BLD AUTO: 73.6 % — SIGNIFICANT CHANGE UP (ref 43–77)
NRBC # BLD: 0 /100 WBCS — SIGNIFICANT CHANGE UP (ref 0–0)
PLATELET # BLD AUTO: 421 K/UL — HIGH (ref 150–400)
POTASSIUM SERPL-MCNC: 4.9 MMOL/L — SIGNIFICANT CHANGE UP (ref 3.5–5.3)
POTASSIUM SERPL-SCNC: 4.9 MMOL/L — SIGNIFICANT CHANGE UP (ref 3.5–5.3)
PROT SERPL-MCNC: 9.5 G/DL — HIGH (ref 6–8.3)
RBC # BLD: 5.77 M/UL — SIGNIFICANT CHANGE UP (ref 4.2–5.8)
RBC # FLD: 13.4 % — SIGNIFICANT CHANGE UP (ref 10.3–14.5)
SARS-COV-2 RNA SPEC QL NAA+PROBE: SIGNIFICANT CHANGE UP
SODIUM SERPL-SCNC: 137 MMOL/L — SIGNIFICANT CHANGE UP (ref 135–145)
WBC # BLD: 8.42 K/UL — SIGNIFICANT CHANGE UP (ref 3.8–10.5)
WBC # FLD AUTO: 8.42 K/UL — SIGNIFICANT CHANGE UP (ref 3.8–10.5)

## 2021-05-20 RX ORDER — PROCHLORPERAZINE MALEATE 5 MG
1 TABLET ORAL
Qty: 15 | Refills: 0
Start: 2021-05-20 | End: 2021-05-24

## 2021-05-20 NOTE — ED PROVIDER NOTE - ATTENDING CONTRIBUTION TO CARE
Patient recently admitted and discharged for new diagnosis gastroparesis vs cannabinoid hyperemesis, now back with recurrence of symptoms.  Similar to prior episode.  Reporting not improved with home zofran.    Exam:  General: Patient well appearing, vital signs within normal limits  HEENT: airway patent with moist mucous membranes  Cardiac: RRR S1/S2 with strong peripheral pulses  Respiratory: lungs clear without respiratory distress  GI: abdomen soft, non tender, non distended  Neuro: no gross neurologic deficits  Skin: warm, well perfused  Psych: normal mood and affect    Patient with recurrence of gastroparesis, benign abdominal exam, do not suspect surgical or obstructive etiology of vomiting.  Plan for labs, intravenous fluids, antiemetics and re-eval.    After treatment patient symptomatically improved and tolerating PO in Emergency Department.  Will discharge.

## 2021-05-20 NOTE — ED PROVIDER NOTE - PHYSICAL EXAMINATION
GENERAL: Patient awake alert NAD.  HEENT: NC/AT, dry mucous membranes.  LUNGS: CTAB, no wheezes or crackles.  CARDIAC: RRR, no m/r/g.  ABDOMEN: Soft, epigastric abd tenderness, negative Saenz's sign, ND, No rebound, guarding.  EXT: No edema. No calf tenderness. CV 2+DP/PT bilaterally.   MSK: No pain with movement, no deformities.  NEURO: A&Ox3. Moving all extremities.  SKIN: Warm and dry. No rash.  PSYCH: Normal affect.

## 2021-05-20 NOTE — ED PROVIDER NOTE - PATIENT PORTAL LINK FT
You can access the FollowMyHealth Patient Portal offered by [facility] by registering at the following website: [email or web address]. By joining [employer]’s FollowMyHealth portal, you will also be able to view your health information using other applications (apps) compatible with our system.

## 2021-05-20 NOTE — ED PROVIDER NOTE - CLINICAL SUMMARY MEDICAL DECISION MAKING FREE TEXT BOX
25M p/w n/v since yesterday.  Recently diagnosed gastroparesis.  VSS, no f/c.  Pt. vomiting, but other non-toxic appearing.  Will obtain labs to assess for elec derangements.  Will administer fluids, anti-emetics.  Gastroparesis vs cannabinoid hyperemesis.  Will reassess and dispo pending results.

## 2021-05-20 NOTE — ED PROVIDER NOTE - OBJECTIVE STATEMENT
25M w/ recently diagnosed gastroparesis p/w n/v since yesterday.  Developed epigastric abd pain after multiple episodes of vomiting.  Denies any f/c, sick contacts, CP, SOB, urinary sxs.  Blood streaked vomiting the last few times.  Chart review shows pt. had a gastric emptying studying that was diagnostic for gastroparesis.  Pt. denies marijuana use but utox at prior admission was positive for marijuana.  Denies any CP, SOB.

## 2021-05-20 NOTE — ED PROVIDER NOTE - NS ED ROS FT
General: denies fever, chills, weight loss/weight gain.  HENT: denies nasal congestion, sore throat, rhinorrhea, ear pain.  Eyes: denies visual changes, blurred vision, eye discharge, eye redness.  Neck: denies neck pain, neck swelling.  CV: denies chest pain, palpitations.  Resp: denies difficulty breathing, cough.  Abdominal: +nausea, vomiting, abdominal pain; denies diarrhea, blood in stool, dark stool.  MSK: denies muscle aches, bony pain, leg pain, leg swelling.  Neuro: denies headaches, numbness, tingling, dizziness, lightheadedness.  Skin: denies rashes, cuts, bruises.  Hematologic: denies unexplained bruises.

## 2021-05-20 NOTE — ED PROVIDER NOTE - PROGRESS NOTE DETAILS
Pramod PGY2 - Pt. feels improved after antiemetics.  Tolerated PO.  Stable for dc w/ GI f/u.  Will prescribe anti-emetics.  All other questions and concerns have been addressed.  Pt. will be dc/d.

## 2021-05-20 NOTE — ED PROVIDER NOTE - NSFOLLOWUPINSTRUCTIONS_ED_ALL_ED_FT
You were seen for nausea and vomiting.  Your symptoms are likely due to gastroparesis.    I have prescribed an anti-nausea medication to your pharmacy.  Take this only IF NEEDED every 6 hours.    Follow up with your GI doctor within one week and bring a copy of all your results.    If you have any concerns, seek immediate medical attention.

## 2021-05-21 ENCOUNTER — INPATIENT (INPATIENT)
Facility: HOSPITAL | Age: 26
LOS: 15 days | Discharge: HOME | End: 2021-06-06
Attending: INTERNAL MEDICINE | Admitting: INTERNAL MEDICINE
Payer: COMMERCIAL

## 2021-05-21 VITALS
OXYGEN SATURATION: 98 % | TEMPERATURE: 98 F | HEART RATE: 71 BPM | WEIGHT: 259.04 LBS | RESPIRATION RATE: 16 BRPM | SYSTOLIC BLOOD PRESSURE: 126 MMHG | DIASTOLIC BLOOD PRESSURE: 91 MMHG

## 2021-05-21 DIAGNOSIS — R11.2 NAUSEA WITH VOMITING, UNSPECIFIED: ICD-10-CM

## 2021-05-21 DIAGNOSIS — E87.8 OTHER DISORDERS OF ELECTROLYTE AND FLUID BALANCE, NOT ELSEWHERE CLASSIFIED: ICD-10-CM

## 2021-05-21 DIAGNOSIS — Z87.81 PERSONAL HISTORY OF (HEALED) TRAUMATIC FRACTURE: Chronic | ICD-10-CM

## 2021-05-21 DIAGNOSIS — Z29.9 ENCOUNTER FOR PROPHYLACTIC MEASURES, UNSPECIFIED: ICD-10-CM

## 2021-05-21 LAB
ACETONE SERPL-MCNC: NEGATIVE — SIGNIFICANT CHANGE UP
ALBUMIN SERPL ELPH-MCNC: 4.3 G/DL — SIGNIFICANT CHANGE UP (ref 3.3–5)
ALP SERPL-CCNC: 88 U/L — SIGNIFICANT CHANGE UP (ref 40–120)
ALT FLD-CCNC: 114 U/L — HIGH (ref 12–78)
AMPHET UR-MCNC: NEGATIVE — SIGNIFICANT CHANGE UP
ANION GAP SERPL CALC-SCNC: 12 MMOL/L — SIGNIFICANT CHANGE UP (ref 5–17)
APPEARANCE UR: CLEAR — SIGNIFICANT CHANGE UP
AST SERPL-CCNC: 34 U/L — SIGNIFICANT CHANGE UP (ref 15–37)
BACTERIA # UR AUTO: ABNORMAL
BARBITURATES UR SCN-MCNC: NEGATIVE — SIGNIFICANT CHANGE UP
BASOPHILS # BLD AUTO: 0.04 K/UL — SIGNIFICANT CHANGE UP (ref 0–0.2)
BASOPHILS NFR BLD AUTO: 0.4 % — SIGNIFICANT CHANGE UP (ref 0–2)
BENZODIAZ UR-MCNC: NEGATIVE — SIGNIFICANT CHANGE UP
BILIRUB SERPL-MCNC: 1 MG/DL — SIGNIFICANT CHANGE UP (ref 0.2–1.2)
BILIRUB UR-MCNC: ABNORMAL
BUN SERPL-MCNC: 9 MG/DL — SIGNIFICANT CHANGE UP (ref 7–23)
CALCIUM SERPL-MCNC: 10.6 MG/DL — HIGH (ref 8.5–10.1)
CHLORIDE SERPL-SCNC: 93 MMOL/L — LOW (ref 96–108)
CO2 SERPL-SCNC: 31 MMOL/L — SIGNIFICANT CHANGE UP (ref 22–31)
COCAINE METAB.OTHER UR-MCNC: NEGATIVE — SIGNIFICANT CHANGE UP
COLOR SPEC: ABNORMAL
CREAT SERPL-MCNC: 1.14 MG/DL — SIGNIFICANT CHANGE UP (ref 0.5–1.3)
DIFF PNL FLD: ABNORMAL
EOSINOPHIL # BLD AUTO: 0.02 K/UL — SIGNIFICANT CHANGE UP (ref 0–0.5)
EOSINOPHIL NFR BLD AUTO: 0.2 % — SIGNIFICANT CHANGE UP (ref 0–6)
EPI CELLS # UR: SIGNIFICANT CHANGE UP
FLUAV AG NPH QL: SIGNIFICANT CHANGE UP
FLUBV AG NPH QL: SIGNIFICANT CHANGE UP
GLUCOSE SERPL-MCNC: 115 MG/DL — HIGH (ref 70–99)
GLUCOSE UR QL: NEGATIVE MG/DL — SIGNIFICANT CHANGE UP
HCT VFR BLD CALC: 46.8 % — SIGNIFICANT CHANGE UP (ref 39–50)
HGB BLD-MCNC: 16.3 G/DL — SIGNIFICANT CHANGE UP (ref 13–17)
IMM GRANULOCYTES NFR BLD AUTO: 0.3 % — SIGNIFICANT CHANGE UP (ref 0–1.5)
KETONES UR-MCNC: ABNORMAL
LACTATE SERPL-SCNC: 1.5 MMOL/L — SIGNIFICANT CHANGE UP (ref 0.7–2)
LEUKOCYTE ESTERASE UR-ACNC: ABNORMAL
LIDOCAIN IGE QN: 64 U/L — LOW (ref 73–393)
LYMPHOCYTES # BLD AUTO: 1.09 K/UL — SIGNIFICANT CHANGE UP (ref 1–3.3)
LYMPHOCYTES # BLD AUTO: 11.7 % — LOW (ref 13–44)
MCHC RBC-ENTMCNC: 28.6 PG — SIGNIFICANT CHANGE UP (ref 27–34)
MCHC RBC-ENTMCNC: 34.8 GM/DL — SIGNIFICANT CHANGE UP (ref 32–36)
MCV RBC AUTO: 82.1 FL — SIGNIFICANT CHANGE UP (ref 80–100)
METHADONE UR-MCNC: NEGATIVE — SIGNIFICANT CHANGE UP
MONOCYTES # BLD AUTO: 1 K/UL — HIGH (ref 0–0.9)
MONOCYTES NFR BLD AUTO: 10.7 % — SIGNIFICANT CHANGE UP (ref 2–14)
NEUTROPHILS # BLD AUTO: 7.14 K/UL — SIGNIFICANT CHANGE UP (ref 1.8–7.4)
NEUTROPHILS NFR BLD AUTO: 76.7 % — SIGNIFICANT CHANGE UP (ref 43–77)
NITRITE UR-MCNC: POSITIVE
NRBC # BLD: 0 /100 WBCS — SIGNIFICANT CHANGE UP (ref 0–0)
OPIATES UR-MCNC: NEGATIVE — SIGNIFICANT CHANGE UP
PCP SPEC-MCNC: SIGNIFICANT CHANGE UP
PCP UR-MCNC: NEGATIVE — SIGNIFICANT CHANGE UP
PH UR: 6 — SIGNIFICANT CHANGE UP (ref 5–8)
PLATELET # BLD AUTO: 458 K/UL — HIGH (ref 150–400)
POTASSIUM SERPL-MCNC: 3.3 MMOL/L — LOW (ref 3.5–5.3)
POTASSIUM SERPL-SCNC: 3.3 MMOL/L — LOW (ref 3.5–5.3)
PROT SERPL-MCNC: 9.4 GM/DL — HIGH (ref 6–8.3)
PROT UR-MCNC: 100 MG/DL
RBC # BLD: 5.7 M/UL — SIGNIFICANT CHANGE UP (ref 4.2–5.8)
RBC # FLD: 13.4 % — SIGNIFICANT CHANGE UP (ref 10.3–14.5)
RBC CASTS # UR COMP ASSIST: SIGNIFICANT CHANGE UP /HPF (ref 0–4)
SARS-COV-2 RNA SPEC QL NAA+PROBE: SIGNIFICANT CHANGE UP
SODIUM SERPL-SCNC: 136 MMOL/L — SIGNIFICANT CHANGE UP (ref 135–145)
SP GR SPEC: 1.02 — SIGNIFICANT CHANGE UP (ref 1.01–1.02)
THC UR QL: POSITIVE — SIGNIFICANT CHANGE UP
UROBILINOGEN FLD QL: 12 MG/DL
WBC # BLD: 9.32 K/UL — SIGNIFICANT CHANGE UP (ref 3.8–10.5)
WBC # FLD AUTO: 9.32 K/UL — SIGNIFICANT CHANGE UP (ref 3.8–10.5)
WBC UR QL: SIGNIFICANT CHANGE UP

## 2021-05-21 PROCEDURE — 71045 X-RAY EXAM CHEST 1 VIEW: CPT | Mod: 26

## 2021-05-21 PROCEDURE — 99223 1ST HOSP IP/OBS HIGH 75: CPT

## 2021-05-21 PROCEDURE — 74018 RADEX ABDOMEN 1 VIEW: CPT | Mod: 26

## 2021-05-21 PROCEDURE — 99285 EMERGENCY DEPT VISIT HI MDM: CPT

## 2021-05-21 RX ORDER — PANTOPRAZOLE SODIUM 20 MG/1
40 TABLET, DELAYED RELEASE ORAL DAILY
Refills: 0 | Status: DISCONTINUED | OUTPATIENT
Start: 2021-05-21 | End: 2021-06-03

## 2021-05-21 RX ORDER — SODIUM CHLORIDE 9 MG/ML
1000 INJECTION, SOLUTION INTRAVENOUS
Refills: 0 | Status: DISCONTINUED | OUTPATIENT
Start: 2021-05-22 | End: 2021-05-22

## 2021-05-21 RX ORDER — SODIUM CHLORIDE 9 MG/ML
1000 INJECTION, SOLUTION INTRAVENOUS
Refills: 0 | Status: DISCONTINUED | OUTPATIENT
Start: 2021-05-21 | End: 2021-05-22

## 2021-05-21 RX ORDER — METOCLOPRAMIDE HCL 10 MG
10 TABLET ORAL ONCE
Refills: 0 | Status: COMPLETED | OUTPATIENT
Start: 2021-05-21 | End: 2021-05-21

## 2021-05-21 RX ORDER — MORPHINE SULFATE 50 MG/1
1 CAPSULE, EXTENDED RELEASE ORAL THREE TIMES A DAY
Refills: 0 | Status: DISCONTINUED | OUTPATIENT
Start: 2021-05-21 | End: 2021-05-23

## 2021-05-21 RX ORDER — POTASSIUM CHLORIDE 20 MEQ
20 PACKET (EA) ORAL ONCE
Refills: 0 | Status: COMPLETED | OUTPATIENT
Start: 2021-05-21 | End: 2021-05-21

## 2021-05-21 RX ORDER — SODIUM CHLORIDE 9 MG/ML
1000 INJECTION INTRAMUSCULAR; INTRAVENOUS; SUBCUTANEOUS ONCE
Refills: 0 | Status: COMPLETED | OUTPATIENT
Start: 2021-05-21 | End: 2021-05-21

## 2021-05-21 RX ORDER — METOCLOPRAMIDE HCL 10 MG
10 TABLET ORAL
Refills: 0 | Status: DISCONTINUED | OUTPATIENT
Start: 2021-05-21 | End: 2021-05-24

## 2021-05-21 RX ADMIN — PANTOPRAZOLE SODIUM 40 MILLIGRAM(S): 20 TABLET, DELAYED RELEASE ORAL at 22:40

## 2021-05-21 RX ADMIN — Medication 10 MILLIGRAM(S): at 23:46

## 2021-05-21 RX ADMIN — Medication 10 MILLIGRAM(S): at 14:34

## 2021-05-21 RX ADMIN — Medication 10 MILLIGRAM(S): at 21:59

## 2021-05-21 RX ADMIN — Medication 10 MILLIGRAM(S): at 18:32

## 2021-05-21 RX ADMIN — SODIUM CHLORIDE 125 MILLILITER(S): 9 INJECTION, SOLUTION INTRAVENOUS at 18:32

## 2021-05-21 RX ADMIN — Medication 20 MILLIEQUIVALENT(S): at 18:32

## 2021-05-21 RX ADMIN — SODIUM CHLORIDE 1000 MILLILITER(S): 9 INJECTION INTRAMUSCULAR; INTRAVENOUS; SUBCUTANEOUS at 14:33

## 2021-05-21 NOTE — ED ADULT TRIAGE NOTE - CHIEF COMPLAINT QUOTE
pt biba a&O x3 pt c.o of extreme  nausea/ vomiting ongoing 7 weeks went to gastro DR today and told to come to er. No diarrhea or constipation, abd pain. Throat pain after vomiting.

## 2021-05-21 NOTE — H&P ADULT - NSHPREVIEWOFSYSTEMS_GEN_ALL_CORE
REVIEW OF SYSTEMS:    CONSTITUTIONAL: No fever, NO generalized weakness/Fatigue, No weight loss  EYES: No eye pain, visual disturbances, or discharge  ENMT:  No difficulty hearing, tinnitus, vertigo; No sinus or throat pain  NECK: No pain or stiffness  RESPIRATORY: No shortness of breath,  cough, wheezing, sputum or hemoptysis   CARDIOVASCULAR: No chest pain, palpitations, or leg swelling  GASTROINTESTINAL: positive abdominal pain,  nausea, and vomiting, no diarrhea or constipation. No melena or hematochezia.  GENITOURINARY: No dysuria, frequency, hematuria, or incontinence  SKIN: No itching, burning, rashes, or lesions   MUSCULOSKELETAL: No joint pain or swelling; No muscle, back, or extremity pain  HEME/LYMPH: No easy bruising, or bleeding gums  NEUROLOGICAL: No headaches, Dizziness, memory loss, loss of strength, numbness, or tremors  PSYCHIATRIC: No depression, anxiety, mood swings, or difficulty sleeping

## 2021-05-21 NOTE — H&P ADULT - PROBLEM SELECTOR PLAN 2
DVTp: low risk, early ambulation    Estimated time for admission 40 minutes. DVTp: low risk, early ambulation    UA: positive infectious markers; pt denies dysuria, will monitor     Estimated time for admission 40 minutes. Secondary to intractable vomiting.     Hypokalemia: will replace   Hypercalcemia: cont w/ IVF

## 2021-05-21 NOTE — H&P ADULT - PROBLEM SELECTOR PLAN 1
Presumed Gastroparesis   GI consult appreciated   f/u KUB  NPO status, cont w/ IVF, IV reglan, analgesics prn
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
## 2021-05-21 NOTE — H&P ADULT - NSHPPHYSICALEXAM_GEN_ALL_CORE
GENERAL: appears uncomfortable.   HEAD:  Atraumatic, Normocephalic  EYES: conjunctiva and sclera clear  ENMT: Moist mucous membranes, Good dentition, No lesions  NECK: Supple, No JVD, Normal thyroid  NERVOUS SYSTEM:  Alert & Oriented X3, Good concentration; Motor Strength 5/5 B/L upper and lower extremities; DTRs 2+ intact and symmetric  CHEST/LUNG: Clear to ascultation bilaterally; No rales, rhonchi, wheezing, or rubs  HEART: Regular rate and rhythm; No murmurs, rubs, or gallops  ABDOMEN: Soft, diffuse tenderness, Nondistended; Bowel sounds present  EXTREMITIES:  2+ Peripheral Pulses, No clubbing, cyanosis, or edema  SKIN: No rashes or lesions

## 2021-05-21 NOTE — ED PROVIDER NOTE - CLINICAL SUMMARY MEDICAL DECISION MAKING FREE TEXT BOX
Pt is ill appearing and intractable vomiting. will admit to hospital. Pt is ill appearing and intractable vomiting. will admit to hospital. dr nogueira aware.

## 2021-05-21 NOTE — H&P ADULT - NSHPLABSRESULTS_GEN_ALL_CORE
16.3   9.32  )-----------( 458      ( 21 May 2021 13:39 )             46.8         136  |  93<L>  |  9   ----------------------------<  115<H>  3.3<L>   |  31  |  1.14    Ca    10.6<H>      21 May 2021 13:39    TPro  9.4<H>  /  Alb  4.3  /  TBili  1.0  /  DBili  x   /  AST  34  /  ALT  114<H>  /  AlkPhos  88        Urinalysis Basic - ( 21 May 2021 14:47 )    Color: Fatuma / Appearance: Clear / S.025 / pH: x  Gluc: x / Ketone: Moderate  / Bili: Moderate / Urobili: 12 mg/dL   Blood: x / Protein: 100 mg/dL / Nitrite: Positive   Leuk Esterase: Trace / RBC: 0-2 /HPF / WBC 0-2   Sq Epi: x / Non Sq Epi: Occasional / Bacteria: Few      < from: Xray Chest 1 View- PORTABLE-Urgent (Xray Chest 1 View- PORTABLE-Urgent .) (21 @ 14:33) >    Heart size iswithin normal limits.    The lung fields and pleural surfaces are unremarkable.    No free intraperitoneal air is evident.    IMPRESSION: Negative chest.    < end of copied text >

## 2021-05-21 NOTE — ED PROVIDER NOTE - CARE PLAN
Principal Discharge DX:	Intractable vomiting   Principal Discharge DX:	Intractable vomiting  Secondary Diagnosis:	Gastroparesis

## 2021-05-21 NOTE — ED PROVIDER NOTE - PHYSICAL EXAMINATION
Gen: Alert, weak appearing, dry MM  Head: NC, AT, PERRL, normal lids/conjunctiva   ENT: patent oropharynx without erythema/exudate, uvula midline  Neck: supple, no tenderness/meningismus  Pulm: Bilateral clear BS, normal resp effort  CV: RRR, no M/R/G, +dist pulses   Abd: soft, NT/ND, +BS, no guarding/rebound tenderness  Mskel: no edema/erythema/cyanosis   Skin: no rash, no bruising  Neuro: AAOx3, no sensory/motor deficits, CN 2-12 intact

## 2021-05-21 NOTE — H&P ADULT - PROBLEM SELECTOR PLAN 3
DVTp: low risk, early ambulation    UA: positive infectious markers; pt denies dysuria, will monitor     Estimated time for admission 40 minutes.
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
## 2021-05-22 LAB
ALBUMIN SERPL ELPH-MCNC: 3.7 G/DL — SIGNIFICANT CHANGE UP (ref 3.3–5)
ALP SERPL-CCNC: 72 U/L — SIGNIFICANT CHANGE UP (ref 40–120)
ALT FLD-CCNC: 103 U/L — HIGH (ref 12–78)
ANION GAP SERPL CALC-SCNC: 9 MMOL/L — SIGNIFICANT CHANGE UP (ref 5–17)
AST SERPL-CCNC: 38 U/L — HIGH (ref 15–37)
BILIRUB DIRECT SERPL-MCNC: 0.3 MG/DL — HIGH (ref 0.05–0.2)
BILIRUB INDIRECT FLD-MCNC: 0.9 MG/DL — SIGNIFICANT CHANGE UP (ref 0.2–1)
BILIRUB SERPL-MCNC: 1.2 MG/DL — SIGNIFICANT CHANGE UP (ref 0.2–1.2)
BUN SERPL-MCNC: 7 MG/DL — SIGNIFICANT CHANGE UP (ref 7–23)
CALCIUM SERPL-MCNC: 9.6 MG/DL — SIGNIFICANT CHANGE UP (ref 8.5–10.1)
CHLORIDE SERPL-SCNC: 95 MMOL/L — LOW (ref 96–108)
CO2 SERPL-SCNC: 31 MMOL/L — SIGNIFICANT CHANGE UP (ref 22–31)
COVID-19 SPIKE DOMAIN AB INTERP: NEGATIVE — SIGNIFICANT CHANGE UP
COVID-19 SPIKE DOMAIN ANTIBODY RESULT: 0.4 U/ML — SIGNIFICANT CHANGE UP
CREAT SERPL-MCNC: 0.91 MG/DL — SIGNIFICANT CHANGE UP (ref 0.5–1.3)
CULTURE RESULTS: SIGNIFICANT CHANGE UP
GLUCOSE SERPL-MCNC: 99 MG/DL — SIGNIFICANT CHANGE UP (ref 70–99)
HCT VFR BLD CALC: 43.3 % — SIGNIFICANT CHANGE UP (ref 39–50)
HGB BLD-MCNC: 14.5 G/DL — SIGNIFICANT CHANGE UP (ref 13–17)
MAGNESIUM SERPL-MCNC: 2.3 MG/DL — SIGNIFICANT CHANGE UP (ref 1.6–2.6)
MCHC RBC-ENTMCNC: 28.4 PG — SIGNIFICANT CHANGE UP (ref 27–34)
MCHC RBC-ENTMCNC: 33.5 GM/DL — SIGNIFICANT CHANGE UP (ref 32–36)
MCV RBC AUTO: 84.7 FL — SIGNIFICANT CHANGE UP (ref 80–100)
NRBC # BLD: 0 /100 WBCS — SIGNIFICANT CHANGE UP (ref 0–0)
PHOSPHATE SERPL-MCNC: 3.8 MG/DL — SIGNIFICANT CHANGE UP (ref 2.5–4.5)
PLATELET # BLD AUTO: 389 K/UL — SIGNIFICANT CHANGE UP (ref 150–400)
POTASSIUM SERPL-MCNC: 2.9 MMOL/L — CRITICAL LOW (ref 3.5–5.3)
POTASSIUM SERPL-MCNC: 3 MMOL/L — LOW (ref 3.5–5.3)
POTASSIUM SERPL-SCNC: 2.9 MMOL/L — CRITICAL LOW (ref 3.5–5.3)
POTASSIUM SERPL-SCNC: 3 MMOL/L — LOW (ref 3.5–5.3)
PROT SERPL-MCNC: 8 GM/DL — SIGNIFICANT CHANGE UP (ref 6–8.3)
RBC # BLD: 5.11 M/UL — SIGNIFICANT CHANGE UP (ref 4.2–5.8)
RBC # FLD: 13.3 % — SIGNIFICANT CHANGE UP (ref 10.3–14.5)
SARS-COV-2 IGG+IGM SERPL QL IA: 0.4 U/ML — SIGNIFICANT CHANGE UP
SARS-COV-2 IGG+IGM SERPL QL IA: NEGATIVE — SIGNIFICANT CHANGE UP
SODIUM SERPL-SCNC: 135 MMOL/L — SIGNIFICANT CHANGE UP (ref 135–145)
SPECIMEN SOURCE: SIGNIFICANT CHANGE UP
WBC # BLD: 7.93 K/UL — SIGNIFICANT CHANGE UP (ref 3.8–10.5)
WBC # FLD AUTO: 7.93 K/UL — SIGNIFICANT CHANGE UP (ref 3.8–10.5)

## 2021-05-22 PROCEDURE — 74177 CT ABD & PELVIS W/CONTRAST: CPT | Mod: 26

## 2021-05-22 PROCEDURE — 70450 CT HEAD/BRAIN W/O DYE: CPT | Mod: 26

## 2021-05-22 PROCEDURE — 99233 SBSQ HOSP IP/OBS HIGH 50: CPT

## 2021-05-22 RX ORDER — DEXTROSE MONOHYDRATE, SODIUM CHLORIDE, AND POTASSIUM CHLORIDE 50; .745; 4.5 G/1000ML; G/1000ML; G/1000ML
1000 INJECTION, SOLUTION INTRAVENOUS
Refills: 0 | Status: DISCONTINUED | OUTPATIENT
Start: 2021-05-23 | End: 2021-05-26

## 2021-05-22 RX ORDER — DEXTROSE MONOHYDRATE, SODIUM CHLORIDE, AND POTASSIUM CHLORIDE 50; .745; 4.5 G/1000ML; G/1000ML; G/1000ML
1000 INJECTION, SOLUTION INTRAVENOUS
Refills: 0 | Status: DISCONTINUED | OUTPATIENT
Start: 2021-05-22 | End: 2021-05-24

## 2021-05-22 RX ORDER — ACETAMINOPHEN 500 MG
650 TABLET ORAL EVERY 6 HOURS
Refills: 0 | Status: DISCONTINUED | OUTPATIENT
Start: 2021-05-22 | End: 2021-06-01

## 2021-05-22 RX ORDER — METOCLOPRAMIDE HCL 10 MG
10 TABLET ORAL ONCE
Refills: 0 | Status: COMPLETED | OUTPATIENT
Start: 2021-05-22 | End: 2021-05-22

## 2021-05-22 RX ORDER — POTASSIUM CHLORIDE 20 MEQ
10 PACKET (EA) ORAL
Refills: 0 | Status: COMPLETED | OUTPATIENT
Start: 2021-05-22 | End: 2021-05-22

## 2021-05-22 RX ORDER — POTASSIUM CHLORIDE 20 MEQ
40 PACKET (EA) ORAL ONCE
Refills: 0 | Status: COMPLETED | OUTPATIENT
Start: 2021-05-22 | End: 2021-05-22

## 2021-05-22 RX ORDER — ONDANSETRON 8 MG/1
8 TABLET, FILM COATED ORAL ONCE
Refills: 0 | Status: COMPLETED | OUTPATIENT
Start: 2021-05-22 | End: 2021-05-22

## 2021-05-22 RX ORDER — ENOXAPARIN SODIUM 100 MG/ML
40 INJECTION SUBCUTANEOUS DAILY
Refills: 0 | Status: DISCONTINUED | OUTPATIENT
Start: 2021-05-22 | End: 2021-06-02

## 2021-05-22 RX ORDER — SENNA PLUS 8.6 MG/1
2 TABLET ORAL AT BEDTIME
Refills: 0 | Status: DISCONTINUED | OUTPATIENT
Start: 2021-05-22 | End: 2021-06-06

## 2021-05-22 RX ORDER — IOHEXOL 300 MG/ML
30 INJECTION, SOLUTION INTRAVENOUS ONCE
Refills: 0 | Status: COMPLETED | OUTPATIENT
Start: 2021-05-22 | End: 2021-05-22

## 2021-05-22 RX ADMIN — Medication 650 MILLIGRAM(S): at 11:45

## 2021-05-22 RX ADMIN — MORPHINE SULFATE 1 MILLIGRAM(S): 50 CAPSULE, EXTENDED RELEASE ORAL at 00:49

## 2021-05-22 RX ADMIN — ENOXAPARIN SODIUM 40 MILLIGRAM(S): 100 INJECTION SUBCUTANEOUS at 21:25

## 2021-05-22 RX ADMIN — Medication 40 MILLIEQUIVALENT(S): at 21:24

## 2021-05-22 RX ADMIN — SODIUM CHLORIDE 63 MILLILITER(S): 9 INJECTION, SOLUTION INTRAVENOUS at 05:11

## 2021-05-22 RX ADMIN — Medication 10 MILLIGRAM(S): at 22:19

## 2021-05-22 RX ADMIN — Medication 40 MILLIEQUIVALENT(S): at 10:56

## 2021-05-22 RX ADMIN — Medication 10 MILLIGRAM(S): at 17:28

## 2021-05-22 RX ADMIN — Medication 650 MILLIGRAM(S): at 10:55

## 2021-05-22 RX ADMIN — Medication 10 MILLIGRAM(S): at 23:55

## 2021-05-22 RX ADMIN — MORPHINE SULFATE 1 MILLIGRAM(S): 50 CAPSULE, EXTENDED RELEASE ORAL at 00:09

## 2021-05-22 RX ADMIN — Medication 10 MILLIGRAM(S): at 05:10

## 2021-05-22 RX ADMIN — ONDANSETRON 8 MILLIGRAM(S): 8 TABLET, FILM COATED ORAL at 09:26

## 2021-05-22 RX ADMIN — Medication 10 MILLIGRAM(S): at 12:34

## 2021-05-22 RX ADMIN — PANTOPRAZOLE SODIUM 40 MILLIGRAM(S): 20 TABLET, DELAYED RELEASE ORAL at 14:19

## 2021-05-22 RX ADMIN — IOHEXOL 30 MILLILITER(S): 300 INJECTION, SOLUTION INTRAVENOUS at 16:49

## 2021-05-22 RX ADMIN — DEXTROSE MONOHYDRATE, SODIUM CHLORIDE, AND POTASSIUM CHLORIDE 100 MILLILITER(S): 50; .745; 4.5 INJECTION, SOLUTION INTRAVENOUS at 19:00
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
## 2021-05-22 NOTE — PROGRESS NOTE ADULT - SUBJECTIVE AND OBJECTIVE BOX
Patient is a 25y old  Male who presents with a chief complaint of Nausea/vomiting (21 May 2021 17:59)      INTERVAL HPI/ OVERNIGHT EVENTS: Pt was seen and examined at bedside today, No significant overnight events, pt admits that abdominal pain and vomiting is improving, he would like to try CLD today.      MEDICATIONS  (STANDING):  dextrose 5% + sodium chloride 0.45%. 1000 milliLiter(s) (125 mL/Hr) IV Continuous <Continuous>  dextrose 5% + sodium chloride 0.45%. 1000 milliLiter(s) (63 mL/Hr) IV Continuous <Continuous>  metoclopramide Injectable 10 milliGRAM(s) IV Push four times a day  pantoprazole  Injectable 40 milliGRAM(s) IV Push daily    MEDICATIONS  (PRN):  acetaminophen   Tablet .. 650 milliGRAM(s) Oral every 6 hours PRN Temp greater or equal to 38C (100.4F), Mild Pain (1 - 3)  morphine  - Injectable 1 milliGRAM(s) IV Push three times a day PRN Severe Pain (7 - 10)      Allergies    No Known Allergies    Intolerances        REVIEW OF SYSTEMS:    Unable to examine due to [ ] Encephalopathy [ ] Advanced Dementia [ ] Expressive Aphasia [ ] Non-verbal patient    CONSTITUTIONAL: No fever, NO generalized weakness/Fatigue, No weight loss  EYES: No eye pain, visual disturbances, or discharge  ENMT:  No difficulty hearing, tinnitus, vertigo; No sinus or throat pain  NECK: No pain or stiffness  RESPIRATORY: No shortness of breath,  cough, wheezing, sputum or hemoptysis   CARDIOVASCULAR: No chest pain, palpitations, or leg swelling  GASTROINTESTINAL: Positive abdominal pain, nausea, and vomiting, No diarrhea or constipation. No melena or hematochezia.  GENITOURINARY: No dysuria, frequency, hematuria, or incontinence  NEUROLOGICAL: No headaches, Dizziness, memory loss, loss of strength, numbness, or tremors  SKIN: No itching, burning, rashes, or lesions   MUSCULOSKELETAL: No joint pain or swelling; No muscle, back, or extremity pain  PSYCHIATRIC: No depression, anxiety, mood swings, or difficulty sleeping  HEME/LYMPH: No easy bruising, or bleeding gums      Vital Signs Last 24 Hrs  T(C): 36.6 (22 May 2021 11:25), Max: 36.9 (21 May 2021 18:50)  T(F): 97.9 (22 May 2021 11:25), Max: 98.4 (21 May 2021 18:50)  HR: 75 (22 May 2021 11:25) (71 - 75)  BP: 152/91 (22 May 2021 11:25) (133/82 - 152/91)  BP(mean): --  RR: 17 (22 May 2021 11:25) (17 - 20)  SpO2: 93% (22 May 2021 11:25) (93% - 97%)    PHYSICAL EXAM:  GENERAL: NAD, appears nausea   HEAD:  Atraumatic, Normocephalic  EYES: conjunctiva and sclera clear  ENMT: Moist mucous membranes  NECK: Supple, No JVD, Normal thyroid  CHEST/LUNG: Clear to Auscultation bilaterally; No rales, rhonchi, wheezing, or rubs  HEART: Regular rate and rhythm; No murmurs, rubs, or gallops  ABDOMEN: Soft, Nontender, Nondistended; Bowel sounds present  EXTREMITIES:  2+ Peripheral Pulses, No clubbing, cyanosis, or edema  SKIN: No rashes or lesions  NERVOUS SYSTEM:  Alert & Oriented X3, Good concentration; Motor Strength 5/5 B/L upper and lower extremities    LABS:                        14.5   7.93  )-----------( 389      ( 22 May 2021 06:55 )             43.3     05-    135  |  95<L>  |  7   ----------------------------<  99  2.9<LL>   |  31  |  0.91    Ca    9.6      22 May 2021 06:55  Phos  3.8       Mg     2.3         TPro  8.0  /  Alb  3.7  /  TBili  1.2  /  DBili  .30<H>  /  AST  38<H>  /  ALT  103<H>  /  AlkPhos  72        Urinalysis Basic - ( 21 May 2021 14:47 )    Color: Fatuma / Appearance: Clear / S.025 / pH: x  Gluc: x / Ketone: Moderate  / Bili: Moderate / Urobili: 12 mg/dL   Blood: x / Protein: 100 mg/dL / Nitrite: Positive   Leuk Esterase: Trace / RBC: 0-2 /HPF / WBC 0-2   Sq Epi: x / Non Sq Epi: Occasional / Bacteria: Few      CAPILLARY BLOOD GLUCOSE            Culture - Urine (collected 21)  Source: .Urine Clean Catch (Midstream)  Final Report (21):    <10,000 CFU/mL Normal Urogenital Luna        RADIOLOGY & ADDITIONAL TESTS:          Imaging Personally Reviewed:  [ ] YES  [ ] NO    Consultant(s) Notes Reviewed:  [ ] YES  [ ] NO    Care Discussed with Consultants/Other Providers [x ] YES  [ ] NO
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
## 2021-05-22 NOTE — CHART NOTE - NSCHARTNOTEFT_GEN_A_CORE
Patient is a 25y old  Male who presents with a chief complaint of Nausea/vomiting (22 May 2021 18:13)    Called by RN to evaluate pt. with c/o   Vital Signs Last 24 Hrs  T(C): 36.6 (22 May 2021 17:25), Max: 36.8 (21 May 2021 23:03)  T(F): 97.8 (22 May 2021 17:25), Max: 98.2 (21 May 2021 23:03)  HR: 73 (22 May 2021 17:25) (71 - 75)  BP: 135/80 (22 May 2021 17:25) (133/82 - 152/91)  BP(mean): --  RR: 18 (22 May 2021 17:25) (17 - 19)  SpO2: 97% (22 May 2021 17:25) (93% - 97%)                              14.5   7.93  )-----------( 389      ( 22 May 2021 06:55 )             43.3     05-22    x   |  x   |  x   ----------------------------<  x   3.0<L>   |  x   |  x     Ca    9.6      22 May 2021 06:55  Phos  3.8     05-22  Mg     2.3     05-22    TPro  8.0  /  Alb  3.7  /  TBili  1.2  /  DBili  .30<H>  /  AST  38<H>  /  ALT  103<H>  /  AlkPhos  72  05-22    LIVER FUNCTIONS - ( 22 May 2021 06:55 )  Alb: 3.7 g/dL / Pro: 8.0 gm/dL / ALK PHOS: 72 U/L / ALT: 103 U/L / AST: 38 U/L / GGT: x                                   CAPILLARY BLOOD GLUCOSE        acetaminophen   Tablet .. 650 milliGRAM(s) Oral every 6 hours PRN  dextrose 5% + sodium chloride 0.45% with potassium chloride 20 mEq/L 1000 milliLiter(s) IV Continuous <Continuous>  enoxaparin Injectable 40 milliGRAM(s) SubCutaneous daily  metoclopramide Injectable 10 milliGRAM(s) IV Push four times a day  morphine  - Injectable 1 milliGRAM(s) IV Push three times a day PRN  pantoprazole  Injectable 40 milliGRAM(s) IV Push daily  potassium chloride   Powder 40 milliEquivalent(s) Oral once  potassium chloride  10 mEq/100 mL IVPB 10 milliEquivalent(s) IV Intermittent every 1 hour  senna 2 Tablet(s) Oral at bedtime      REVIEW OF SYSTEMS:    RESPIRATORY: No cough, wheezing, chills or hemoptysis; No shortness of breath  CARDIOVASCULAR: No chest pain, palpitations, dizziness, or leg swelling  GASTROINTESTINAL: No abdominal or epigastric pain. No nausea, vomiting, or hematemesis; No diarrhea or constipation. No melena or hematochezia.  GENITOURINARY: No dysuria, frequency, hematuria, or incontinence  NEUROLOGICAL: No headaches, memory loss, loss of strength, numbness, or tremors      PHYSICAL EXAM:    GENERAL: NAD, well-groomed, well-developed  NERVOUS SYSTEM:  Alert & Oriented X3, Good concentration; Motor Strength 5/5 B/L upper and lower extremities; DTRs 2+ intact and symmetric  CHEST/LUNG: Clear to percussion bilaterally; No rales, rhonchi, wheezing, or rubs  HEART: Regular rate and rhythm; No murmurs, rubs, or gallops  ABDOMEN: Soft, Nontender, Nondistended; Bowel sounds present  EXTREMITIES:  2+ Peripheral Pulses, No clubbing, cyanosis, or edema    Assessment: Patient 25y with NAUSEA VOMITTING    INTRACTABLE VOMITING, GASTROPARESIS    Gastroparesis    No significant past surgical history        Plan:  - Continue current treatment  - Follow up labs  - D/w                       aware and agree with the plan  - Will continue to follow up Patient is a 25y old  Male who presents with a chief complaint of Nausea/vomiting (22 May 2021 18:13)    Called by RN to report that pt. is refusing potassium 10meq IVSS x3 doses 1 hour apart for K 3.0  Pt. reports that he can tolerate PO potassium   Case D/W DR. Nazario   Potassium 40meq PO powder ordered       Vital Signs Last 24 Hrs  T(C): 36.6 (22 May 2021 17:25), Max: 36.8 (21 May 2021 23:03)  T(F): 97.8 (22 May 2021 17:25), Max: 98.2 (21 May 2021 23:03)  HR: 73 (22 May 2021 17:25) (71 - 75)  BP: 135/80 (22 May 2021 17:25) (133/82 - 152/91)  BP(mean): --  RR: 18 (22 May 2021 17:25) (17 - 19)  SpO2: 97% (22 May 2021 17:25) (93% - 97%                        14.5   7.93  )-----------( 389      ( 22 May 2021 06:55 )             43.3     05-22    x   |  x   |  x   ----------------------------<  x   3.0<L>   |  x   |  x     Ca    9.6      22 May 2021 06:55  Phos  3.8     05-22  Mg     2.3     05-22    TPro  8.0  /  Alb  3.7  /  TBili  1.2  /  DBili  .30<H>  /  AST  38<H>  /  ALT  103<H>  /  AlkPhos  72  05-22    LIVER FUNCTIONS - ( 22 May 2021 06:55 )  Alb: 3.7 g/dL / Pro: 8.0 gm/dL / ALK PHOS: 72 U/L / ALT: 103 U/L / AST: 38 U/L / GGT: x                                   CAPILLARY BLOOD GLUCOSE        acetaminophen   Tablet .. 650 milliGRAM(s) Oral every 6 hours PRN  dextrose 5% + sodium chloride 0.45% with potassium chloride 20 mEq/L 1000 milliLiter(s) IV Continuous <Continuous>  enoxaparin Injectable 40 milliGRAM(s) SubCutaneous daily  metoclopramide Injectable 10 milliGRAM(s) IV Push four times a day  morphine  - Injectable 1 milliGRAM(s) IV Push three times a day PRN  pantoprazole  Injectable 40 milliGRAM(s) IV Push daily  potassium chloride   Powder 40 milliEquivalent(s) Oral once  potassium chloride  10 mEq/100 mL IVPB 10 milliEquivalent(s) IV Intermittent every 1 hour  senna 2 Tablet(s) Oral at bedtime      REVIEW OF SYSTEMS:    RESPIRATORY: No cough, wheezing, chills or hemoptysis; No shortness of breath  CARDIOVASCULAR: No chest pain, palpitations, dizziness, or leg swelling  GASTROINTESTINAL: No abdominal or epigastric pain. No nausea, vomiting, or hematemesis; No diarrhea or constipation. No melena or hematochezia.  GENITOURINARY: No dysuria, frequency, hematuria, or incontinence  NEUROLOGICAL: No headaches, memory loss, loss of strength, numbness, or tremors      PHYSICAL EXAM:    GENERAL: NAD, well-groomed, well-developed  NERVOUS SYSTEM:  Alert & Oriented X3, Good concentration; Motor Strength 5/5 B/L upper and lower extremities; DTRs 2+ intact and symmetric  CHEST/LUNG: Clear to percussion bilaterally; No rales, rhonchi, wheezing, or rubs  HEART: Regular rate and rhythm; No murmurs, rubs, or gallops  ABDOMEN: Soft, Nontender, Nondistended; Bowel sounds present  EXTREMITIES:  2+ Peripheral Pulses, No clubbing, cyanosis, or edema    Assessment: Patient 25y with NAUSEA VOMITTING    INTRACTABLE VOMITING, GASTROPARESIS    Gastroparesis    No significant past surgical history        Plan:  - Continue current treatment  - Follow up labs  - D/w                       aware and agree with the plan  - Will continue to follow up Patient is a 25y old  Male who presents with a chief complaint of nausea/vomiting (22 May 2021 18:13)    Called by RN to report that pt. is refusing potassium 10meq IVSS x3 doses 1 hour apart for K 3.0  Pt. reports that he can tolerate PO potassium   Case D/W DR. Nazario     Vital Signs Last 24 Hrs  T(C): 36.6 (22 May 2021 17:25), Max: 36.8 (21 May 2021 23:03)  T(F): 97.8 (22 May 2021 17:25), Max: 98.2 (21 May 2021 23:03)  HR: 73 (22 May 2021 17:25) (71 - 75)  BP: 135/80 (22 May 2021 17:25) (133/82 - 152/91)  BP(mean): --  RR: 18 (22 May 2021 17:25) (17 - 19)  SpO2: 97% (22 May 2021 17:25) (93% - 97%                        14.5   7.93  )-----------( 389      ( 22 May 2021 06:55 )             43.3     05-22    x   |  x   |  x   ----------------------------<  x   3.0<L>   |  x   |  x     Ca    9.6      22 May 2021 06:55  Phos  3.8     05-22  Mg     2.3     05-22    TPro  8.0  /  Alb  3.7  /  TBili  1.2  /  DBili  .30<H>  /  AST  38<H>  /  ALT  103<H>  /  AlkPhos  72  05-22    LIVER FUNCTIONS - ( 22 May 2021 06:55 )  Alb: 3.7 g/dL / Pro: 8.0 gm/dL / ALK PHOS: 72 U/L / ALT: 103 U/L / AST: 38 U/L / GGT: x                                     < from: CT Abdomen and Pelvis w/ Oral Cont and w/ IV Cont (05.22.21 @ 17:58) >  IMPRESSION:  Bladder was not well-distended. Focal thickening in enhancement of the anterior superior bladder, recommend correlation with urinalysis and direct visualization.  No bowel obstruction. No gastric distention. Normal appendix.      acetaminophen   Tablet .. 650 milliGRAM(s) Oral every 6 hours PRN  dextrose 5% + sodium chloride 0.45% with potassium chloride 20 mEq/L 1000 milliLiter(s) IV Continuous <Continuous>  enoxaparin Injectable 40 milliGRAM(s) SubCutaneous daily  metoclopramide Injectable 10 milliGRAM(s) IV Push four times a day  morphine  - Injectable 1 milliGRAM(s) IV Push three times a day PRN  pantoprazole  Injectable 40 milliGRAM(s) IV Push daily  potassium chloride   Powder 40 milliEquivalent(s) Oral once  potassium chloride  10 mEq/100 mL IVPB 10 milliEquivalent(s) IV Intermittent every 1 hour  senna 2 Tablet(s) Oral at bedtime      Assessment:   Patient is a 25y old  Male who presents with a chief complaint of nausea/vomiting, gastroparesis     Plan:  -Potassium 40meq PO powder ordered   -Urology consult (Focal thickening in enhancement of the anterior superior bladder)  -AM labs ordered   -Continue current treatment  -Follow-up with attending

## 2021-05-23 DIAGNOSIS — N32.89 OTHER SPECIFIED DISORDERS OF BLADDER: ICD-10-CM

## 2021-05-23 LAB
ANION GAP SERPL CALC-SCNC: 7 MMOL/L — SIGNIFICANT CHANGE UP (ref 5–17)
ANION GAP SERPL CALC-SCNC: 9 MMOL/L — SIGNIFICANT CHANGE UP (ref 5–17)
BUN SERPL-MCNC: 4 MG/DL — LOW (ref 7–23)
BUN SERPL-MCNC: 5 MG/DL — LOW (ref 7–23)
CALCIUM SERPL-MCNC: 9.5 MG/DL — SIGNIFICANT CHANGE UP (ref 8.5–10.1)
CALCIUM SERPL-MCNC: 9.6 MG/DL — SIGNIFICANT CHANGE UP (ref 8.5–10.1)
CHLORIDE SERPL-SCNC: 96 MMOL/L — SIGNIFICANT CHANGE UP (ref 96–108)
CHLORIDE SERPL-SCNC: 96 MMOL/L — SIGNIFICANT CHANGE UP (ref 96–108)
CO2 SERPL-SCNC: 30 MMOL/L — SIGNIFICANT CHANGE UP (ref 22–31)
CO2 SERPL-SCNC: 32 MMOL/L — HIGH (ref 22–31)
CREAT SERPL-MCNC: 0.96 MG/DL — SIGNIFICANT CHANGE UP (ref 0.5–1.3)
CREAT SERPL-MCNC: 0.98 MG/DL — SIGNIFICANT CHANGE UP (ref 0.5–1.3)
GLUCOSE SERPL-MCNC: 94 MG/DL — SIGNIFICANT CHANGE UP (ref 70–99)
GLUCOSE SERPL-MCNC: 98 MG/DL — SIGNIFICANT CHANGE UP (ref 70–99)
MAGNESIUM SERPL-MCNC: 2.2 MG/DL — SIGNIFICANT CHANGE UP (ref 1.6–2.6)
PHOSPHATE SERPL-MCNC: 3.4 MG/DL — SIGNIFICANT CHANGE UP (ref 2.5–4.5)
POTASSIUM SERPL-MCNC: 2.9 MMOL/L — CRITICAL LOW (ref 3.5–5.3)
POTASSIUM SERPL-MCNC: 3.2 MMOL/L — LOW (ref 3.5–5.3)
POTASSIUM SERPL-SCNC: 2.9 MMOL/L — CRITICAL LOW (ref 3.5–5.3)
POTASSIUM SERPL-SCNC: 3.2 MMOL/L — LOW (ref 3.5–5.3)
SODIUM SERPL-SCNC: 135 MMOL/L — SIGNIFICANT CHANGE UP (ref 135–145)
SODIUM SERPL-SCNC: 135 MMOL/L — SIGNIFICANT CHANGE UP (ref 135–145)

## 2021-05-23 PROCEDURE — 99232 SBSQ HOSP IP/OBS MODERATE 35: CPT

## 2021-05-23 PROCEDURE — 99222 1ST HOSP IP/OBS MODERATE 55: CPT

## 2021-05-23 RX ORDER — POTASSIUM CHLORIDE 20 MEQ
10 PACKET (EA) ORAL
Refills: 0 | Status: DISCONTINUED | OUTPATIENT
Start: 2021-05-23 | End: 2021-05-24

## 2021-05-23 RX ORDER — POTASSIUM CHLORIDE 20 MEQ
10 PACKET (EA) ORAL
Refills: 0 | Status: COMPLETED | OUTPATIENT
Start: 2021-05-23 | End: 2021-05-23

## 2021-05-23 RX ORDER — DIPHENHYDRAMINE HCL 50 MG
25 CAPSULE ORAL ONCE
Refills: 0 | Status: COMPLETED | OUTPATIENT
Start: 2021-05-23 | End: 2021-05-23

## 2021-05-23 RX ADMIN — Medication 100 MILLIEQUIVALENT(S): at 09:55

## 2021-05-23 RX ADMIN — Medication 10 MILLIGRAM(S): at 23:20

## 2021-05-23 RX ADMIN — Medication 10 MILLIGRAM(S): at 05:28

## 2021-05-23 RX ADMIN — MORPHINE SULFATE 1 MILLIGRAM(S): 50 CAPSULE, EXTENDED RELEASE ORAL at 17:40

## 2021-05-23 RX ADMIN — MORPHINE SULFATE 1 MILLIGRAM(S): 50 CAPSULE, EXTENDED RELEASE ORAL at 23:20

## 2021-05-23 RX ADMIN — ENOXAPARIN SODIUM 40 MILLIGRAM(S): 100 INJECTION SUBCUTANEOUS at 11:45

## 2021-05-23 RX ADMIN — Medication 10 MILLIGRAM(S): at 17:00

## 2021-05-23 RX ADMIN — Medication 100 MILLIEQUIVALENT(S): at 11:26

## 2021-05-23 RX ADMIN — MORPHINE SULFATE 1 MILLIGRAM(S): 50 CAPSULE, EXTENDED RELEASE ORAL at 16:41

## 2021-05-23 RX ADMIN — PANTOPRAZOLE SODIUM 40 MILLIGRAM(S): 20 TABLET, DELAYED RELEASE ORAL at 11:44

## 2021-05-23 RX ADMIN — Medication 100 MILLIEQUIVALENT(S): at 08:41

## 2021-05-23 RX ADMIN — DEXTROSE MONOHYDRATE, SODIUM CHLORIDE, AND POTASSIUM CHLORIDE 63 MILLILITER(S): 50; .745; 4.5 INJECTION, SOLUTION INTRAVENOUS at 06:11

## 2021-05-23 RX ADMIN — Medication 25 MILLIGRAM(S): at 00:45

## 2021-05-23 RX ADMIN — Medication 10 MILLIGRAM(S): at 11:44
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
## 2021-05-23 NOTE — PROGRESS NOTE ADULT - SUBJECTIVE AND OBJECTIVE BOX
Patient is a 25y old  Male who presents with a chief complaint of Nausea/vomiting (22 May 2021 18:13)      INTERVAL HPI/ OVERNIGHT EVENTS: Pt was seen and examined at bedside today, No significant overnight events, pt continues to have abdominal pain, N/V but admits that symptoms are improving.      MEDICATIONS  (STANDING):  dextrose 5% + sodium chloride 0.45% with potassium chloride 20 mEq/L 1000 milliLiter(s) (100 mL/Hr) IV Continuous <Continuous>  dextrose 5% + sodium chloride 0.45% with potassium chloride 20 mEq/L 1000 milliLiter(s) (63 mL/Hr) IV Continuous <Continuous>  enoxaparin Injectable 40 milliGRAM(s) SubCutaneous daily  metoclopramide Injectable 10 milliGRAM(s) IV Push four times a day  pantoprazole  Injectable 40 milliGRAM(s) IV Push daily  senna 2 Tablet(s) Oral at bedtime    MEDICATIONS  (PRN):  acetaminophen   Tablet .. 650 milliGRAM(s) Oral every 6 hours PRN Temp greater or equal to 38C (100.4F), Mild Pain (1 - 3)  morphine  - Injectable 1 milliGRAM(s) IV Push three times a day PRN Severe Pain (7 - 10)      Allergies    No Known Allergies    Intolerances        REVIEW OF SYSTEMS:    Unable to examine due to [ ] Encephalopathy [ ] Advanced Dementia [ ] Expressive Aphasia [ ] Non-verbal patient    CONSTITUTIONAL: No fever, NO generalized weakness/Fatigue, No weight loss  EYES: No eye pain, visual disturbances, or discharge  ENMT:  No difficulty hearing, tinnitus, vertigo; No sinus or throat pain  NECK: No pain or stiffness  RESPIRATORY: No shortness of breath,  cough, wheezing, sputum or hemoptysis   CARDIOVASCULAR: No chest pain, palpitations, or leg swelling  GASTROINTESTINAL: Positive abdominal pain, nausea, and vomiting, No diarrhea or constipation. No melena or hematochezia.  GENITOURINARY: No dysuria, frequency, hematuria, or incontinence  NEUROLOGICAL: No headaches, Dizziness, memory loss, loss of strength, numbness, or tremors  SKIN: No itching, burning, rashes, or lesions   MUSCULOSKELETAL: No joint pain or swelling; No muscle, back, or extremity pain  PSYCHIATRIC: No depression, anxiety, mood swings, or difficulty sleeping  HEME/LYMPH: No easy bruising, or bleeding gums      Vital Signs Last 24 Hrs  T(C): 36.7 (23 May 2021 11:37), Max: 36.7 (22 May 2021 23:41)  T(F): 98.1 (23 May 2021 11:37), Max: 98.1 (23 May 2021 11:37)  HR: 85 (23 May 2021 11:37) (68 - 85)  BP: 132/99 (23 May 2021 11:37) (114/63 - 154/85)  BP(mean): --  RR: 18 (23 May 2021 11:37) (18 - 18)  SpO2: 96% (23 May 2021 11:37) (96% - 97%)    PHYSICAL EXAM:  GENERAL: NAD, appears nausea   HEAD:  Atraumatic, Normocephalic  EYES: conjunctiva and sclera clear  ENMT: Moist mucous membranes  NECK: Supple, No JVD, Normal thyroid  CHEST/LUNG: Clear to Auscultation bilaterally; No rales, rhonchi, wheezing, or rubs  HEART: Regular rate and rhythm; No murmurs, rubs, or gallops  ABDOMEN: Soft, Nontender, Nondistended; Bowel sounds present  EXTREMITIES:  2+ Peripheral Pulses, No clubbing, cyanosis, or edema  SKIN: No rashes or lesions  NERVOUS SYSTEM:  Alert & Oriented X3, Good concentration; Motor Strength 5/5 B/L upper and lower extremities      LABS:                        14.5   7.93  )-----------( 389      ( 22 May 2021 06:55 )             43.3     05-    135  |  96  |  5<L>  ----------------------------<  94  2.9<LL>   |  30  |  0.98    Ca    9.5      23 May 2021 06:59  Phos  3.4     05-  Mg     2.2     05-    TPro  8.0  /  Alb  3.7  /  TBili  1.2  /  DBili  .30<H>  /  AST  38<H>  /  ALT  103<H>  /  AlkPhos  72  05-22      Urinalysis Basic - ( 21 May 2021 14:47 )    Color: Fatuma / Appearance: Clear / S.025 / pH: x  Gluc: x / Ketone: Moderate  / Bili: Moderate / Urobili: 12 mg/dL   Blood: x / Protein: 100 mg/dL / Nitrite: Positive   Leuk Esterase: Trace / RBC: 0-2 /HPF / WBC 0-2   Sq Epi: x / Non Sq Epi: Occasional / Bacteria: Few      CAPILLARY BLOOD GLUCOSE            Culture - Urine (collected 21)  Source: .Urine Clean Catch (Midstream)  Final Report (21):    <10,000 CFU/mL Normal Urogenital Luna        RADIOLOGY & ADDITIONAL TESTS:          Imaging Personally Reviewed:  [ ] YES  [ ] NO    Consultant(s) Notes Reviewed:  [ ] YES  [ ] NO    Care Discussed with Consultants/Other Providers [x ] YES  [ ] NO

## 2021-05-23 NOTE — CONSULT NOTE ADULT - ASSESSMENT
26 y/o Male with PMH of Gastroparesis presents with intractable N/V with thickening in enhancement of the anterior superior bladder.
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
## 2021-05-23 NOTE — PROVIDER CONTACT NOTE (CRITICAL VALUE NOTIFICATION) - SITUATION
Patient admitted for intractable vomiting, gastroparesis
Patient recently admitted due to intractable nausea/vomiting.

## 2021-05-23 NOTE — PROVIDER CONTACT NOTE (CRITICAL VALUE NOTIFICATION) - ASSESSMENT
Patient continues to vomit intermittently, despite medication
VSS, no respiratory distress, no pain reported, patient is vomiting constantly.
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
## 2021-05-24 DIAGNOSIS — R07.9 CHEST PAIN, UNSPECIFIED: ICD-10-CM

## 2021-05-24 LAB
ALBUMIN SERPL ELPH-MCNC: 3.5 G/DL — SIGNIFICANT CHANGE UP (ref 3.3–5)
ALBUMIN SERPL ELPH-MCNC: 3.8 G/DL — SIGNIFICANT CHANGE UP (ref 3.3–5)
ALP SERPL-CCNC: 68 U/L — SIGNIFICANT CHANGE UP (ref 40–120)
ALP SERPL-CCNC: 77 U/L — SIGNIFICANT CHANGE UP (ref 40–120)
ALT FLD-CCNC: 172 U/L — HIGH (ref 12–78)
ALT FLD-CCNC: 223 U/L — HIGH (ref 12–78)
AMMONIA BLD-MCNC: 16 UMOL/L — SIGNIFICANT CHANGE UP (ref 11–32)
AMPHET UR-MCNC: NEGATIVE — SIGNIFICANT CHANGE UP
ANION GAP SERPL CALC-SCNC: 7 MMOL/L — SIGNIFICANT CHANGE UP (ref 5–17)
ANION GAP SERPL CALC-SCNC: 7 MMOL/L — SIGNIFICANT CHANGE UP (ref 5–17)
APPEARANCE UR: CLEAR — SIGNIFICANT CHANGE UP
AST SERPL-CCNC: 73 U/L — HIGH (ref 15–37)
AST SERPL-CCNC: 90 U/L — HIGH (ref 15–37)
BACTERIA # UR AUTO: ABNORMAL
BARBITURATES UR SCN-MCNC: NEGATIVE — SIGNIFICANT CHANGE UP
BASOPHILS # BLD AUTO: 0.02 K/UL — SIGNIFICANT CHANGE UP (ref 0–0.2)
BASOPHILS NFR BLD AUTO: 0.3 % — SIGNIFICANT CHANGE UP (ref 0–2)
BENZODIAZ UR-MCNC: NEGATIVE — SIGNIFICANT CHANGE UP
BILIRUB SERPL-MCNC: 1.1 MG/DL — SIGNIFICANT CHANGE UP (ref 0.2–1.2)
BILIRUB SERPL-MCNC: 1.3 MG/DL — HIGH (ref 0.2–1.2)
BILIRUB UR-MCNC: ABNORMAL
BUN SERPL-MCNC: 5 MG/DL — LOW (ref 7–23)
BUN SERPL-MCNC: 5 MG/DL — LOW (ref 7–23)
CALCIUM SERPL-MCNC: 9.4 MG/DL — SIGNIFICANT CHANGE UP (ref 8.5–10.1)
CALCIUM SERPL-MCNC: 9.7 MG/DL — SIGNIFICANT CHANGE UP (ref 8.5–10.1)
CHLORIDE SERPL-SCNC: 97 MMOL/L — SIGNIFICANT CHANGE UP (ref 96–108)
CHLORIDE SERPL-SCNC: 98 MMOL/L — SIGNIFICANT CHANGE UP (ref 96–108)
CK MB BLD-MCNC: <0.6 % — SIGNIFICANT CHANGE UP (ref 0–3.5)
CK MB CFR SERPL CALC: <1 NG/ML — SIGNIFICANT CHANGE UP (ref 0.5–3.6)
CK SERPL-CCNC: 165 U/L — SIGNIFICANT CHANGE UP (ref 26–308)
CO2 SERPL-SCNC: 30 MMOL/L — SIGNIFICANT CHANGE UP (ref 22–31)
CO2 SERPL-SCNC: 31 MMOL/L — SIGNIFICANT CHANGE UP (ref 22–31)
COCAINE METAB.OTHER UR-MCNC: NEGATIVE — SIGNIFICANT CHANGE UP
COLOR SPEC: ABNORMAL
CREAT SERPL-MCNC: 0.88 MG/DL — SIGNIFICANT CHANGE UP (ref 0.5–1.3)
CREAT SERPL-MCNC: 0.93 MG/DL — SIGNIFICANT CHANGE UP (ref 0.5–1.3)
DIFF PNL FLD: NEGATIVE — SIGNIFICANT CHANGE UP
EOSINOPHIL # BLD AUTO: 0.09 K/UL — SIGNIFICANT CHANGE UP (ref 0–0.5)
EOSINOPHIL NFR BLD AUTO: 1.5 % — SIGNIFICANT CHANGE UP (ref 0–6)
EPI CELLS # UR: SIGNIFICANT CHANGE UP
GLUCOSE SERPL-MCNC: 104 MG/DL — HIGH (ref 70–99)
GLUCOSE SERPL-MCNC: 95 MG/DL — SIGNIFICANT CHANGE UP (ref 70–99)
GLUCOSE UR QL: NEGATIVE MG/DL — SIGNIFICANT CHANGE UP
HCT VFR BLD CALC: 41.5 % — SIGNIFICANT CHANGE UP (ref 39–50)
HGB BLD-MCNC: 14.5 G/DL — SIGNIFICANT CHANGE UP (ref 13–17)
IMM GRANULOCYTES NFR BLD AUTO: 0.3 % — SIGNIFICANT CHANGE UP (ref 0–1.5)
KETONES UR-MCNC: ABNORMAL
LACTATE SERPL-SCNC: 1.5 MMOL/L — SIGNIFICANT CHANGE UP (ref 0.7–2)
LEUKOCYTE ESTERASE UR-ACNC: ABNORMAL
LYMPHOCYTES # BLD AUTO: 0.78 K/UL — LOW (ref 1–3.3)
LYMPHOCYTES # BLD AUTO: 13.3 % — SIGNIFICANT CHANGE UP (ref 13–44)
MAGNESIUM SERPL-MCNC: 2.1 MG/DL — SIGNIFICANT CHANGE UP (ref 1.6–2.6)
MCHC RBC-ENTMCNC: 28.7 PG — SIGNIFICANT CHANGE UP (ref 27–34)
MCHC RBC-ENTMCNC: 34.9 GM/DL — SIGNIFICANT CHANGE UP (ref 32–36)
MCV RBC AUTO: 82.2 FL — SIGNIFICANT CHANGE UP (ref 80–100)
METHADONE UR-MCNC: NEGATIVE — SIGNIFICANT CHANGE UP
MONOCYTES # BLD AUTO: 0.55 K/UL — SIGNIFICANT CHANGE UP (ref 0–0.9)
MONOCYTES NFR BLD AUTO: 9.4 % — SIGNIFICANT CHANGE UP (ref 2–14)
NEUTROPHILS # BLD AUTO: 4.41 K/UL — SIGNIFICANT CHANGE UP (ref 1.8–7.4)
NEUTROPHILS NFR BLD AUTO: 75.2 % — SIGNIFICANT CHANGE UP (ref 43–77)
NITRITE UR-MCNC: POSITIVE
NRBC # BLD: 0 /100 WBCS — SIGNIFICANT CHANGE UP (ref 0–0)
OPIATES UR-MCNC: POSITIVE — SIGNIFICANT CHANGE UP
PCP SPEC-MCNC: SIGNIFICANT CHANGE UP
PCP UR-MCNC: NEGATIVE — SIGNIFICANT CHANGE UP
PH UR: 6.5 — SIGNIFICANT CHANGE UP (ref 5–8)
PHOSPHATE SERPL-MCNC: 3.6 MG/DL — SIGNIFICANT CHANGE UP (ref 2.5–4.5)
PLATELET # BLD AUTO: 334 K/UL — SIGNIFICANT CHANGE UP (ref 150–400)
POTASSIUM SERPL-MCNC: 3.1 MMOL/L — LOW (ref 3.5–5.3)
POTASSIUM SERPL-MCNC: 3.4 MMOL/L — LOW (ref 3.5–5.3)
POTASSIUM SERPL-SCNC: 3.1 MMOL/L — LOW (ref 3.5–5.3)
POTASSIUM SERPL-SCNC: 3.4 MMOL/L — LOW (ref 3.5–5.3)
PROT SERPL-MCNC: 7.4 GM/DL — SIGNIFICANT CHANGE UP (ref 6–8.3)
PROT SERPL-MCNC: 8.1 GM/DL — SIGNIFICANT CHANGE UP (ref 6–8.3)
PROT UR-MCNC: 30 MG/DL
RBC # BLD: 5.05 M/UL — SIGNIFICANT CHANGE UP (ref 4.2–5.8)
RBC # FLD: 13.3 % — SIGNIFICANT CHANGE UP (ref 10.3–14.5)
RBC CASTS # UR COMP ASSIST: SIGNIFICANT CHANGE UP /HPF (ref 0–4)
SODIUM SERPL-SCNC: 134 MMOL/L — LOW (ref 135–145)
SODIUM SERPL-SCNC: 136 MMOL/L — SIGNIFICANT CHANGE UP (ref 135–145)
SP GR SPEC: 1.01 — SIGNIFICANT CHANGE UP (ref 1.01–1.02)
THC UR QL: POSITIVE — SIGNIFICANT CHANGE UP
TROPONIN I SERPL-MCNC: <.015 NG/ML — SIGNIFICANT CHANGE UP (ref 0.01–0.04)
UROBILINOGEN FLD QL: 12 MG/DL
WBC # BLD: 5.87 K/UL — SIGNIFICANT CHANGE UP (ref 3.8–10.5)
WBC # FLD AUTO: 5.87 K/UL — SIGNIFICANT CHANGE UP (ref 3.8–10.5)
WBC UR QL: SIGNIFICANT CHANGE UP

## 2021-05-24 PROCEDURE — 99233 SBSQ HOSP IP/OBS HIGH 50: CPT

## 2021-05-24 PROCEDURE — 93010 ELECTROCARDIOGRAM REPORT: CPT

## 2021-05-24 RX ORDER — FAMOTIDINE 10 MG/ML
20 INJECTION INTRAVENOUS ONCE
Refills: 0 | Status: COMPLETED | OUTPATIENT
Start: 2021-05-24 | End: 2021-05-24

## 2021-05-24 RX ORDER — SODIUM CHLORIDE 9 MG/ML
1000 INJECTION INTRAMUSCULAR; INTRAVENOUS; SUBCUTANEOUS
Refills: 0 | Status: DISCONTINUED | OUTPATIENT
Start: 2021-05-24 | End: 2021-05-26

## 2021-05-24 RX ORDER — ONDANSETRON 8 MG/1
4 TABLET, FILM COATED ORAL EVERY 6 HOURS
Refills: 0 | Status: DISCONTINUED | OUTPATIENT
Start: 2021-05-24 | End: 2021-05-25

## 2021-05-24 RX ORDER — POTASSIUM CHLORIDE 20 MEQ
10 PACKET (EA) ORAL
Refills: 0 | Status: COMPLETED | OUTPATIENT
Start: 2021-05-24 | End: 2021-05-24

## 2021-05-24 RX ADMIN — SODIUM CHLORIDE 100 MILLILITER(S): 9 INJECTION INTRAMUSCULAR; INTRAVENOUS; SUBCUTANEOUS at 22:23

## 2021-05-24 RX ADMIN — ONDANSETRON 4 MILLIGRAM(S): 8 TABLET, FILM COATED ORAL at 18:24

## 2021-05-24 RX ADMIN — FAMOTIDINE 20 MILLIGRAM(S): 10 INJECTION INTRAVENOUS at 22:22

## 2021-05-24 RX ADMIN — MORPHINE SULFATE 1 MILLIGRAM(S): 50 CAPSULE, EXTENDED RELEASE ORAL at 00:05

## 2021-05-24 RX ADMIN — Medication 100 MILLIEQUIVALENT(S): at 00:30

## 2021-05-24 RX ADMIN — Medication 10 MILLIGRAM(S): at 05:09

## 2021-05-24 RX ADMIN — Medication 100 MILLIEQUIVALENT(S): at 05:09

## 2021-05-24 RX ADMIN — PANTOPRAZOLE SODIUM 40 MILLIGRAM(S): 20 TABLET, DELAYED RELEASE ORAL at 11:39

## 2021-05-24 RX ADMIN — Medication 100 MILLIEQUIVALENT(S): at 01:30

## 2021-05-24 RX ADMIN — DEXTROSE MONOHYDRATE, SODIUM CHLORIDE, AND POTASSIUM CHLORIDE 63 MILLILITER(S): 50; .745; 4.5 INJECTION, SOLUTION INTRAVENOUS at 17:34

## 2021-05-24 RX ADMIN — ENOXAPARIN SODIUM 40 MILLIGRAM(S): 100 INJECTION SUBCUTANEOUS at 11:40

## 2021-05-24 RX ADMIN — DEXTROSE MONOHYDRATE, SODIUM CHLORIDE, AND POTASSIUM CHLORIDE 63 MILLILITER(S): 50; .745; 4.5 INJECTION, SOLUTION INTRAVENOUS at 22:23

## 2021-05-24 RX ADMIN — DEXTROSE MONOHYDRATE, SODIUM CHLORIDE, AND POTASSIUM CHLORIDE 63 MILLILITER(S): 50; .745; 4.5 INJECTION, SOLUTION INTRAVENOUS at 05:10

## 2021-05-24 RX ADMIN — Medication 10 MILLIGRAM(S): at 17:34

## 2021-05-24 RX ADMIN — ONDANSETRON 4 MILLIGRAM(S): 8 TABLET, FILM COATED ORAL at 23:03

## 2021-05-24 RX ADMIN — Medication 1 ENEMA: at 18:24

## 2021-05-24 RX ADMIN — Medication 10 MILLIGRAM(S): at 11:39
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
## 2021-05-24 NOTE — PROGRESS NOTE ADULT - SUBJECTIVE AND OBJECTIVE BOX
Patient seen and examined bedside resting comfortably.  No complaints offered.   Denies having issues with urination today   NPO per medicine   Denies chest pain, dyspnea, cough.    T(F): 98.1 (05-24-21 @ 12:15), Max: 98.4 (05-23-21 @ 23:16)  HR: 86 (05-24-21 @ 12:15) (66 - 86)  BP: 146/85 (05-24-21 @ 12:15) (136/92 - 153/103)  RR: 18 (05-24-21 @ 12:15) (17 - 18)  SpO2: 99% (05-24-21 @ 05:00) (96% - 99%)      PHYSICAL EXAM:    General: NAD, alert and awake  HEENT: NCAT, EOMI, conjunctiva clear  Chest: nonlabored respirations, CTA b/l.  Abdomen: soft, NT/ND.   Extremities: Calf soft, nontender b/l.   : No suprapubic tenderness or bladder distention.  PVR 60cc    LABS:  05-24    134<L>  |  97  |  5<L>  ----------------------------<  95  3.1<L>   |  30  |  0.88    Ca    9.4      24 May 2021 06:21  Phos  3.6     05-24  Mg     2.1     05-24    TPro  7.4  /  Alb  3.5  /  TBili  1.1  /  DBili  x   /  AST  73<H>  /  ALT  172<H>  /  AlkPhos  68  05-24    I&O's Detail    23 May 2021 07:01  -  24 May 2021 07:00  --------------------------------------------------------  IN:    dextrose 5% + sodium chloride 0.45% w/ Additives: 780 mL    Oral Fluid: 240 mL  Total IN: 1020 mL    OUT:  Total OUT: 0 mL    Total NET: 1020 mL

## 2021-05-24 NOTE — PROGRESS NOTE ADULT - ASSESSMENT
Impression: 24 y/o Male with PMH of Gastroparesis presents with intractable N/V with thickening in enhancement of the anterior superior bladder. PVR this AM 60cc      Plan:  Culture still negative at this time  Follow up outpatient for cystoscopy.      Urology to sign off, please reach out with any further questions and please have patient followup outpatient     Discussed with Dr. Dukes

## 2021-05-24 NOTE — PROGRESS NOTE ADULT - SUBJECTIVE AND OBJECTIVE BOX
Patient is a 25y old  Male who presents with a chief complaint of Nausea/vomiting (24 May 2021 17:25)      INTERVAL HPI/ OVERNIGHT EVENTS: Pt was seen and examined at bedside today, No significant overnight events, pt c/o chest tightness when getting IV potassium, he admits that his nausea/vomiting is improving. denies SOB or palpitations.      MEDICATIONS  (STANDING):  dextrose 5% + sodium chloride 0.45% with potassium chloride 20 mEq/L 1000 milliLiter(s) (100 mL/Hr) IV Continuous <Continuous>  dextrose 5% + sodium chloride 0.45% with potassium chloride 20 mEq/L 1000 milliLiter(s) (63 mL/Hr) IV Continuous <Continuous>  enoxaparin Injectable 40 milliGRAM(s) SubCutaneous daily  ondansetron Injectable 4 milliGRAM(s) IV Push every 6 hours  pantoprazole  Injectable 40 milliGRAM(s) IV Push daily  saline laxative (FLEET) Rectal Enema 1 Enema Rectal once  senna 2 Tablet(s) Oral at bedtime    MEDICATIONS  (PRN):  acetaminophen   Tablet .. 650 milliGRAM(s) Oral every 6 hours PRN Temp greater or equal to 38C (100.4F), Mild Pain (1 - 3)  bisacodyl 5 milliGRAM(s) Oral every 12 hours PRN Constipation  morphine  - Injectable 1 milliGRAM(s) IV Push three times a day PRN Severe Pain (7 - 10)      Allergies    No Known Allergies    Intolerances        REVIEW OF SYSTEMS:    Unable to examine due to [ ] Encephalopathy [ ] Advanced Dementia [ ] Expressive Aphasia [ ] Non-verbal patient    CONSTITUTIONAL: No fever, NO generalized weakness/Fatigue, No weight loss  EYES: No eye pain, visual disturbances, or discharge  ENMT:  No difficulty hearing, tinnitus, vertigo; No sinus or throat pain  NECK: No pain or stiffness  RESPIRATORY: No shortness of breath,  cough, wheezing, sputum or hemoptysis   CARDIOVASCULAR: positive chest pain, no palpitations, or leg swelling  GASTROINTESTINAL: Positive abdominal pain, nausea, and vomiting, No diarrhea or constipation. No melena or hematochezia.  GENITOURINARY: No dysuria, frequency, hematuria, or incontinence  NEUROLOGICAL: No headaches, Dizziness, memory loss, loss of strength, numbness, or tremors  SKIN: No itching, burning, rashes, or lesions   MUSCULOSKELETAL: No joint pain or swelling; No muscle, back, or extremity pain  PSYCHIATRIC: No depression, anxiety, mood swings, or difficulty sleeping  HEME/LYMPH: No easy bruising, or bleeding gums      Vital Signs Last 24 Hrs  T(C): 36.7 (24 May 2021 18:17), Max: 36.9 (23 May 2021 23:16)  T(F): 98.1 (24 May 2021 18:17), Max: 98.4 (23 May 2021 23:16)  HR: 63 (24 May 2021 18:17) (63 - 86)  BP: 147/86 (24 May 2021 18:17) (137/70 - 147/86)  BP(mean): --  RR: 18 (24 May 2021 18:17) (17 - 18)  SpO2: 100% (24 May 2021 18:17) (94% - 100%)    PHYSICAL EXAM:  GENERAL: NAD, appears drowsy   HEAD:  Atraumatic, Normocephalic  EYES: conjunctiva and sclera clear  ENMT: Moist mucous membranes  NECK: Supple, No JVD, Normal thyroid  CHEST/LUNG: Clear to Auscultation bilaterally; No rales, rhonchi, wheezing, or rubs  HEART: Regular rate and rhythm; No murmurs, rubs, or gallops  ABDOMEN: Soft, Nontender, Nondistended; Bowel sounds present  EXTREMITIES:  2+ Peripheral Pulses, No clubbing, cyanosis, or edema  SKIN: No rashes or lesions  NERVOUS SYSTEM:  Alert & Oriented X3, Good concentration; Motor Strength 5/5 B/L upper and lower extremities    LABS:    05-24    134<L>  |  97  |  5<L>  ----------------------------<  95  3.1<L>   |  30  |  0.88    Ca    9.4      24 May 2021 06:21  Phos  3.6     05-24  Mg     2.1     05-24    TPro  7.4  /  Alb  3.5  /  TBili  1.1  /  DBili  x   /  AST  73<H>  /  ALT  172<H>  /  AlkPhos  68  05-24        CAPILLARY BLOOD GLUCOSE            Culture - Urine (collected 05-21-21)  Source: .Urine Clean Catch (Midstream)  Final Report (05-22-21):    <10,000 CFU/mL Normal Urogenital Luna        RADIOLOGY & ADDITIONAL TESTS:          Imaging Personally Reviewed:  [ ] YES  [ ] NO    Consultant(s) Notes Reviewed:  [ ] YES  [ ] NO    Care Discussed with Consultants/Other Providers [x ] YES  [ ] NO
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
## 2021-05-24 NOTE — PROGRESS NOTE ADULT - PROBLEM SELECTOR PLAN 5
DVTp: low risk, early ambulation     Estimated time for admission 40 minutes. DVTp: low risk, early ambulation       Case discussed with Pt Mother.     Estimated time for admission 35 minutes.

## 2021-05-25 LAB
ALBUMIN SERPL ELPH-MCNC: 3.2 G/DL — LOW (ref 3.3–5)
ALP SERPL-CCNC: 70 U/L — SIGNIFICANT CHANGE UP (ref 40–120)
ALT FLD-CCNC: 206 U/L — HIGH (ref 12–78)
AMMONIA BLD-MCNC: 54 UMOL/L — HIGH (ref 11–32)
ANION GAP SERPL CALC-SCNC: 7 MMOL/L — SIGNIFICANT CHANGE UP (ref 5–17)
ANION GAP SERPL CALC-SCNC: 9 MMOL/L — SIGNIFICANT CHANGE UP (ref 5–17)
AST SERPL-CCNC: 86 U/L — HIGH (ref 15–37)
BILIRUB DIRECT SERPL-MCNC: 0.51 MG/DL — HIGH (ref 0.05–0.2)
BILIRUB INDIRECT FLD-MCNC: 0.6 MG/DL — SIGNIFICANT CHANGE UP (ref 0.2–1)
BILIRUB SERPL-MCNC: 1.1 MG/DL — SIGNIFICANT CHANGE UP (ref 0.2–1.2)
BUN SERPL-MCNC: 5 MG/DL — LOW (ref 7–23)
BUN SERPL-MCNC: 5 MG/DL — LOW (ref 7–23)
CALCIUM SERPL-MCNC: 9 MG/DL — SIGNIFICANT CHANGE UP (ref 8.5–10.1)
CALCIUM SERPL-MCNC: 9.1 MG/DL — SIGNIFICANT CHANGE UP (ref 8.5–10.1)
CHLORIDE SERPL-SCNC: 102 MMOL/L — SIGNIFICANT CHANGE UP (ref 96–108)
CHLORIDE SERPL-SCNC: 104 MMOL/L — SIGNIFICANT CHANGE UP (ref 96–108)
CO2 SERPL-SCNC: 25 MMOL/L — SIGNIFICANT CHANGE UP (ref 22–31)
CO2 SERPL-SCNC: 28 MMOL/L — SIGNIFICANT CHANGE UP (ref 22–31)
CREAT SERPL-MCNC: 0.79 MG/DL — SIGNIFICANT CHANGE UP (ref 0.5–1.3)
CREAT SERPL-MCNC: 0.82 MG/DL — SIGNIFICANT CHANGE UP (ref 0.5–1.3)
GLUCOSE SERPL-MCNC: 100 MG/DL — HIGH (ref 70–99)
GLUCOSE SERPL-MCNC: 97 MG/DL — SIGNIFICANT CHANGE UP (ref 70–99)
HAV IGM SER-ACNC: SIGNIFICANT CHANGE UP
HBV CORE IGM SER-ACNC: SIGNIFICANT CHANGE UP
HBV SURFACE AG SER-ACNC: SIGNIFICANT CHANGE UP
HCV AB S/CO SERPL IA: 0.39 S/CO — SIGNIFICANT CHANGE UP (ref 0–0.99)
HCV AB SERPL-IMP: SIGNIFICANT CHANGE UP
MAGNESIUM SERPL-MCNC: 2 MG/DL — SIGNIFICANT CHANGE UP (ref 1.6–2.6)
POTASSIUM SERPL-MCNC: 3.2 MMOL/L — LOW (ref 3.5–5.3)
POTASSIUM SERPL-MCNC: 3.5 MMOL/L — SIGNIFICANT CHANGE UP (ref 3.5–5.3)
POTASSIUM SERPL-SCNC: 3.2 MMOL/L — LOW (ref 3.5–5.3)
POTASSIUM SERPL-SCNC: 3.5 MMOL/L — SIGNIFICANT CHANGE UP (ref 3.5–5.3)
PROT SERPL-MCNC: 6.9 GM/DL — SIGNIFICANT CHANGE UP (ref 6–8.3)
SODIUM SERPL-SCNC: 137 MMOL/L — SIGNIFICANT CHANGE UP (ref 135–145)
SODIUM SERPL-SCNC: 138 MMOL/L — SIGNIFICANT CHANGE UP (ref 135–145)
TROPONIN I SERPL-MCNC: <.015 NG/ML — SIGNIFICANT CHANGE UP (ref 0.01–0.04)

## 2021-05-25 PROCEDURE — 76700 US EXAM ABDOM COMPLETE: CPT | Mod: 26

## 2021-05-25 PROCEDURE — 99223 1ST HOSP IP/OBS HIGH 75: CPT

## 2021-05-25 PROCEDURE — 99233 SBSQ HOSP IP/OBS HIGH 50: CPT

## 2021-05-25 RX ORDER — DIPHENHYDRAMINE HCL 50 MG
25 CAPSULE ORAL EVERY 6 HOURS
Refills: 0 | Status: DISCONTINUED | OUTPATIENT
Start: 2021-05-25 | End: 2021-06-03

## 2021-05-25 RX ORDER — FAMOTIDINE 10 MG/ML
0 INJECTION INTRAVENOUS
Qty: 0 | Refills: 0 | DISCHARGE

## 2021-05-25 RX ORDER — ONDANSETRON 8 MG/1
1 TABLET, FILM COATED ORAL
Qty: 0 | Refills: 0 | DISCHARGE

## 2021-05-25 RX ORDER — POTASSIUM CHLORIDE 20 MEQ
10 PACKET (EA) ORAL
Refills: 0 | Status: COMPLETED | OUTPATIENT
Start: 2021-05-25 | End: 2021-05-25

## 2021-05-25 RX ORDER — DIPHENHYDRAMINE HCL 50 MG
25 CAPSULE ORAL ONCE
Refills: 0 | Status: DISCONTINUED | OUTPATIENT
Start: 2021-05-25 | End: 2021-05-25

## 2021-05-25 RX ADMIN — ENOXAPARIN SODIUM 40 MILLIGRAM(S): 100 INJECTION SUBCUTANEOUS at 13:32

## 2021-05-25 RX ADMIN — Medication 100 MILLIEQUIVALENT(S): at 13:31

## 2021-05-25 RX ADMIN — Medication 100 MILLIEQUIVALENT(S): at 14:49

## 2021-05-25 RX ADMIN — PANTOPRAZOLE SODIUM 40 MILLIGRAM(S): 20 TABLET, DELAYED RELEASE ORAL at 13:31

## 2021-05-25 RX ADMIN — DEXTROSE MONOHYDRATE, SODIUM CHLORIDE, AND POTASSIUM CHLORIDE 63 MILLILITER(S): 50; .745; 4.5 INJECTION, SOLUTION INTRAVENOUS at 20:01

## 2021-05-25 RX ADMIN — Medication 100 MILLIEQUIVALENT(S): at 16:58

## 2021-05-25 RX ADMIN — ONDANSETRON 4 MILLIGRAM(S): 8 TABLET, FILM COATED ORAL at 05:52

## 2021-05-25 NOTE — PROGRESS NOTE ADULT - SUBJECTIVE AND OBJECTIVE BOX
Patient is a 25y old  Male who presents with a chief complaint of Nausea/vomiting (25 May 2021 11:29)      INTERVAL HPI/ OVERNIGHT EVENTS: Pt was seen and examined at bedside today, No significant overnight events, pt continues to have nausea and vomiting but admits that it has improved, denies abdominal pain today, pt was able to have Bowel movement yesterday with laxatives. he denies any Cp or sob overnight     MEDICATIONS  (STANDING):  dextrose 5% + sodium chloride 0.45% with potassium chloride 20 mEq/L 1000 milliLiter(s) (63 mL/Hr) IV Continuous <Continuous>  enoxaparin Injectable 40 milliGRAM(s) SubCutaneous daily  pantoprazole  Injectable 40 milliGRAM(s) IV Push daily  potassium chloride  10 mEq/100 mL IVPB 10 milliEquivalent(s) IV Intermittent every 1 hour  senna 2 Tablet(s) Oral at bedtime  sodium chloride 0.9%. 1000 milliLiter(s) (100 mL/Hr) IV Continuous <Continuous>    MEDICATIONS  (PRN):  acetaminophen   Tablet .. 650 milliGRAM(s) Oral every 6 hours PRN Temp greater or equal to 38C (100.4F), Mild Pain (1 - 3)  bisacodyl 5 milliGRAM(s) Oral every 12 hours PRN Constipation  diphenhydrAMINE   Injectable 25 milliGRAM(s) IV Push once PRN Rash and/or Itching  morphine  - Injectable 1 milliGRAM(s) IV Push three times a day PRN Severe Pain (7 - 10)      Allergies    No Known Allergies    Intolerances        REVIEW OF SYSTEMS:    Unable to examine due to [ ] Encephalopathy [ ] Advanced Dementia [ ] Expressive Aphasia [ ] Non-verbal patient    CONSTITUTIONAL: No fever, NO generalized weakness/Fatigue, No weight loss  EYES: No eye pain, visual disturbances, or discharge  ENMT:  No difficulty hearing, tinnitus, vertigo; No sinus or throat pain  NECK: No pain or stiffness  RESPIRATORY: No shortness of breath,  cough, wheezing, sputum or hemoptysis   CARDIOVASCULAR: no chest pain, no palpitations, or leg swelling  GASTROINTESTINAL: Positive nausea, and vomiting, No abdominal pain, No diarrhea or constipation. No melena or hematochezia.  GENITOURINARY: No dysuria, frequency, hematuria, or incontinence  NEUROLOGICAL: No headaches, Dizziness, memory loss, loss of strength, numbness, or tremors  SKIN: No itching, burning, rashes, or lesions   MUSCULOSKELETAL: No joint pain or swelling; No muscle, back, or extremity pain  PSYCHIATRIC: No depression, anxiety, mood swings, or difficulty sleeping  HEME/LYMPH: No easy bruising, or bleeding gums    Vital Signs Last 24 Hrs  T(C): 36.4 (25 May 2021 12:12), Max: 37.1 (25 May 2021 00:12)  T(F): 97.5 (25 May 2021 12:12), Max: 98.7 (25 May 2021 00:12)  HR: 66 (25 May 2021 12:12) (61 - 76)  BP: 145/88 (25 May 2021 12:12) (124/81 - 147/86)  BP(mean): --  RR: 17 (25 May 2021 12:12) (16 - 18)  SpO2: 97% (25 May 2021 12:12) (94% - 100%)    PHYSICAL EXAM:  GENERAL: NAD, well-developed, well-groomed  HEAD:  Atraumatic, Normocephalic  EYES: conjunctiva and sclera clear  ENMT: Moist mucous membranes  NECK: Supple, No JVD, Normal thyroid  CHEST/LUNG: Clear to Auscultation bilaterally; No rales, rhonchi, wheezing, or rubs  HEART: Regular rate and rhythm; No murmurs, rubs, or gallops  ABDOMEN: Mild b/l LQ tenderness, Soft, Nondistended; Bowel sounds present  EXTREMITIES:  2+ Peripheral Pulses, No clubbing, cyanosis, or edema  SKIN: No rashes or lesions  NERVOUS SYSTEM:  Alert & Oriented X3, Good concentration; Motor Strength 5/5 B/L upper and lower extremities    LABS:                        14.5   5.87  )-----------( 334      ( 24 May 2021 19:30 )             41.5     05-25    137  |  102  |  5<L>  ----------------------------<  97  3.2<L>   |  28  |  0.82    Ca    9.0      25 May 2021 08:28  Phos  3.6     05-24  Mg     2.1     05-24    TPro  6.9  /  Alb  3.2<L>  /  TBili  1.1  /  DBili  .51<H>  /  AST  86<H>  /  ALT  206<H>  /  AlkPhos  70        Urinalysis Basic - ( 24 May 2021 21:16 )    Color: Brown / Appearance: Clear / S.015 / pH: x  Gluc: x / Ketone: Trace  / Bili: Moderate / Urobili: 12 mg/dL   Blood: x / Protein: 30 mg/dL / Nitrite: Positive   Leuk Esterase: Trace / RBC: 0-2 /HPF / WBC 0-2   Sq Epi: x / Non Sq Epi: Occasional / Bacteria: Few      CAPILLARY BLOOD GLUCOSE            Culture - Urine (collected 21)  Source: .Urine Clean Catch (Midstream)  Final Report (21):    <10,000 CFU/mL Normal Urogenital Luna        RADIOLOGY & ADDITIONAL TESTS:          Imaging Personally Reviewed:  [ ] YES  [ ] NO    Consultant(s) Notes Reviewed:  [ ] YES  [ ] NO    Care Discussed with Consultants/Other Providers [x ] YES  [ ] NO

## 2021-05-25 NOTE — CONSULT NOTE ADULT - SUBJECTIVE AND OBJECTIVE BOX
CARDIOLOGY CONSULT NOTE    Patient is a 25y Male with a known history of :  Intractable vomiting with nausea [R11.2]    Preventive measure [Z29.9]    Electrolyte abnormality [E87.8]    Bladder wall thickening [N32.89]    Chest pain, unspecified type [R07.9]      HPI:  26 y/o Male with PMH of Gastroparesis presents with intractable N/V and severe intermittent "cramping" diffuse abdominal pain.   EKG: sinus 71bpm with inferior and inferolateral TWIs  No prior cardiac hx.  Denied chest pain/palpitations/dyspnea/syncope.  Robert neg x 2    REVIEW OF SYSTEMS:    CONSTITUTIONAL: No fever, weight loss, or fatigue  EYES: No eye pain, visual disturbances, or discharge  ENMT:  No difficulty hearing, tinnitus, vertigo; No sinus or throat pain  NECK: No pain or stiffness  BREASTS: No pain, masses, or nipple discharge  RESPIRATORY: No cough, wheezing, chills or hemoptysis; No shortness of breath  CARDIOVASCULAR: No chest pain, palpitations, dizziness, or leg swelling  GASTROINTESTINAL:  +epigastric pain, nausea, vomiting  GENITOURINARY: No dysuria, frequency, hematuria, or incontinence  NEUROLOGICAL: No headaches, memory loss, loss of strength, numbness, or tremors  SKIN: No itching, burning, rashes, or lesions   LYMPH NODES: No enlarged glands  ENDOCRINE: No heat or cold intolerance; No hair loss  MUSCULOSKELETAL: No joint pain or swelling; No muscle, back, or extremity pain  PSYCHIATRIC: No depression, anxiety, mood swings, or difficulty sleeping  HEME/LYMPH: No easy bruising, or bleeding gums  ALLERGY AND IMMUNOLOGIC: No hives or eczema    MEDICATIONS  (STANDING):  dextrose 5% + sodium chloride 0.45% with potassium chloride 20 mEq/L 1000 milliLiter(s) (63 mL/Hr) IV Continuous <Continuous>  enoxaparin Injectable 40 milliGRAM(s) SubCutaneous daily  pantoprazole  Injectable 40 milliGRAM(s) IV Push daily  potassium chloride  10 mEq/100 mL IVPB 10 milliEquivalent(s) IV Intermittent every 1 hour  senna 2 Tablet(s) Oral at bedtime  sodium chloride 0.9%. 1000 milliLiter(s) (100 mL/Hr) IV Continuous <Continuous>    MEDICATIONS  (PRN):  acetaminophen   Tablet .. 650 milliGRAM(s) Oral every 6 hours PRN Temp greater or equal to 38C (100.4F), Mild Pain (1 - 3)  bisacodyl 5 milliGRAM(s) Oral every 12 hours PRN Constipation  diphenhydrAMINE 25 milliGRAM(s) Oral every 6 hours PRN Rash and/or Itching  morphine  - Injectable 1 milliGRAM(s) IV Push three times a day PRN Severe Pain (7 - 10)      ALLERGIES: No Known Allergies      FAMILY HISTORY:  FH: diabetes mellitus    No pertinent family history in first degree relatives        PHYSICAL EXAMINATION:  -----------------------------  T(C): 36.6 (05-25-21 @ 16:10), Max: 37.1 (05-25-21 @ 00:12)  HR: 64 (05-25-21 @ 16:10) (57 - 76)  BP: 125/78 (05-25-21 @ 16:10) (124/81 - 147/86)  RR: 18 (05-25-21 @ 16:10) (16 - 18)  SpO2: 97% (05-25-21 @ 16:10) (94% - 100%)      Constitutional: ill appearing  Eyes: the conjunctiva exhibited no abnormalities and the eyelids demonstrated no xanthelasmas.   HEENT: normal oral mucosa, no oral pallor and no oral cyanosis.   Neck: normal jugular venous A waves present, normal jugular venous V waves present and no jugular venous todd A waves.   Pulmonary: no respiratory distress, normal respiratory rhythm and effort, no accessory muscle use and lungs were clear to auscultation bilaterally.   Cardiovascular: heart rate and rhythm were normal, normal S1 and S2 and no murmur, gallop, rub, heave or thrill are present.   Abdomen: soft, non-tender, no hepato-splenomegaly and no abdominal mass palpated.   Musculoskeletal: the gait could not be assessed..   Extremities: no clubbing of the fingernails, no localized cyanosis, no petechial hemorrhages and no ischemic changes.   Skin: normal skin color and pigmentation, no rash, no venous stasis, no skin lesions, no skin ulcer and no xanthoma was observed.   Psychiatric: oriented to person, place, and time, the affect was normal, the mood was normal and not feeling anxious.     LABS:   --------  05-25    137  |  102  |  5<L>  ----------------------------<  97  3.2<L>   |  28  |  0.82    Ca    9.0      25 May 2021 08:28  Phos  3.6     05-24  Mg     2.1     05-24    TPro  6.9  /  Alb  3.2<L>  /  TBili  1.1  /  DBili  .51<H>  /  AST  86<H>  /  ALT  206<H>  /  AlkPhos  70  05-25                         14.5   5.87  )-----------( 334      ( 24 May 2021 19:30 )             41.5         05-25 @ 08:28 CPK total:--, CKMB --, Troponin I - <.015 ng/mL  05-24 @ 14:50 CPK total:--, CKMB --, Troponin I - <.015 ng/mL

## 2021-05-25 NOTE — DIETITIAN INITIAL EVALUATION ADULT. - OTHER INFO
Pt adm w/N/V, per chart pt w/intractable N/V and recent dx of Gastroparesis (05/07/21- s/p gastric emptying study). Met w/pt at bedside, pt found sleepy, tired, laying sideways, however, able to answer questions. Pt s/p nausea and abdominal discomfort, reports having emesis this morning. Pt states N/V is very slowly getting better. Pt also reports mild diarrhea. Prior to becoming sick w/GI symptoms, pt states he was eating a normal diet and was choosing healthy options. Pt reports not being able to eat well for "weeks". Pt reports UBW of 316# x 2 months ago (questionable accuracy). Previous adm weight (05/11/21)= 127 kg; wt loss as noted below. Pt reports doing his own food-shopping/cooking PTA. Discussed initiation of Clear Fluids once N/V is improved and gradual advancement of diet to Low Residue/Fiber. Encouraged pt to consume small, frequent meals for better tolerance. Pt verbalized comprehension. Pt made aware RD remains available.

## 2021-05-25 NOTE — DIETITIAN INITIAL EVALUATION ADULT. - REASON
Pt was tired and laying side-ways at time of visit. Pt also c/o Nausea and abdominal discomfort at time of visit. Unable to conduct nutrition-focused physical exam.

## 2021-05-25 NOTE — DIETITIAN INITIAL EVALUATION ADULT. - PERTINENT MEDS FT
MEDICATIONS  (STANDING):  dextrose 5% + sodium chloride 0.45% with potassium chloride 20 mEq/L 1000 milliLiter(s) (63 mL/Hr) IV Continuous <Continuous>  enoxaparin Injectable 40 milliGRAM(s) SubCutaneous daily  ondansetron Injectable 4 milliGRAM(s) IV Push every 6 hours  pantoprazole  Injectable 40 milliGRAM(s) IV Push daily  senna 2 Tablet(s) Oral at bedtime  sodium chloride 0.9%. 1000 milliLiter(s) (100 mL/Hr) IV Continuous <Continuous>    MEDICATIONS  (PRN):  acetaminophen   Tablet .. 650 milliGRAM(s) Oral every 6 hours PRN Temp greater or equal to 38C (100.4F), Mild Pain (1 - 3)  bisacodyl 5 milliGRAM(s) Oral every 12 hours PRN Constipation  diphenhydrAMINE   Injectable 25 milliGRAM(s) IV Push once PRN Rash and/or Itching  morphine  - Injectable 1 milliGRAM(s) IV Push three times a day PRN Severe Pain (7 - 10)

## 2021-05-25 NOTE — PROGRESS NOTE ADULT - PROBLEM SELECTOR PLAN 5
DVTp: low risk, early ambulation     Pt was counseled on risk factors of marijuana use and advised to discontinue as this may be contributing to his Gastroparesis, pt agrees to refrain from use.     Case discussed with Pt Mother.     Estimated time for admission 35 minutes.
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
## 2021-05-25 NOTE — CONSULT NOTE ADULT - ASSESSMENT
24 y/o Male with PMH of Gastroparesis presents with intractable N/V and severe intermittent "cramping" diffuse abdominal pain.   EKG: sinus 71bpm with inferior and inferolateral TWIs  No prior cardiac hx.  Denied chest pain/palpitations/dyspnea/syncope.  Robert neg x 2    ?if EKG changes 2/2 electrolyte imbalance  Replete lytes aggressively  Will obtain echo to eval LVEF.  Plain exercise stress test as outpt when GI sx resolve

## 2021-05-25 NOTE — DIETITIAN INITIAL EVALUATION ADULT. - PERTINENT LABORATORY DATA
05-25 Na137 mmol/L Glu 97 mg/dL K+ 3.2 mmol/L<L> Cr  0.82 mg/dL BUN 5 mg/dL<L> 05-24 Phos 3.6 mg/dL 05-25 Alb 3.2 g/dL<L>

## 2021-05-25 NOTE — DIETITIAN INITIAL EVALUATION ADULT. - ADD RECOMMEND
If/when medically feasible initiate Clear Liquids. If tolerated, gradually advance diet to Full Liquids, Lactose-free---> Fiber/Residue restricted, Low fat, Lactose-free.

## 2021-05-26 ENCOUNTER — TRANSCRIPTION ENCOUNTER (OUTPATIENT)
Age: 26
End: 2021-05-26

## 2021-05-26 LAB
ALBUMIN SERPL ELPH-MCNC: 3.4 G/DL — SIGNIFICANT CHANGE UP (ref 3.3–5)
ALP SERPL-CCNC: 69 U/L — SIGNIFICANT CHANGE UP (ref 40–120)
ALT FLD-CCNC: 240 U/L — HIGH (ref 12–78)
ANION GAP SERPL CALC-SCNC: 6 MMOL/L — SIGNIFICANT CHANGE UP (ref 5–17)
ANION GAP SERPL CALC-SCNC: 8 MMOL/L — SIGNIFICANT CHANGE UP (ref 5–17)
AST SERPL-CCNC: 92 U/L — HIGH (ref 15–37)
BILIRUB SERPL-MCNC: 0.9 MG/DL — SIGNIFICANT CHANGE UP (ref 0.2–1.2)
BUN SERPL-MCNC: 4 MG/DL — LOW (ref 7–23)
BUN SERPL-MCNC: 4 MG/DL — LOW (ref 7–23)
CALCIUM SERPL-MCNC: 9 MG/DL — SIGNIFICANT CHANGE UP (ref 8.5–10.1)
CALCIUM SERPL-MCNC: 9.3 MG/DL — SIGNIFICANT CHANGE UP (ref 8.5–10.1)
CHLORIDE SERPL-SCNC: 102 MMOL/L — SIGNIFICANT CHANGE UP (ref 96–108)
CHLORIDE SERPL-SCNC: 103 MMOL/L — SIGNIFICANT CHANGE UP (ref 96–108)
CO2 SERPL-SCNC: 25 MMOL/L — SIGNIFICANT CHANGE UP (ref 22–31)
CO2 SERPL-SCNC: 27 MMOL/L — SIGNIFICANT CHANGE UP (ref 22–31)
CREAT SERPL-MCNC: 0.76 MG/DL — SIGNIFICANT CHANGE UP (ref 0.5–1.3)
CREAT SERPL-MCNC: 0.93 MG/DL — SIGNIFICANT CHANGE UP (ref 0.5–1.3)
FLUAV AG NPH QL: SIGNIFICANT CHANGE UP
FLUBV AG NPH QL: SIGNIFICANT CHANGE UP
GLUCOSE SERPL-MCNC: 97 MG/DL — SIGNIFICANT CHANGE UP (ref 70–99)
GLUCOSE SERPL-MCNC: 98 MG/DL — SIGNIFICANT CHANGE UP (ref 70–99)
MAGNESIUM SERPL-MCNC: 2.2 MG/DL — SIGNIFICANT CHANGE UP (ref 1.6–2.6)
PHOSPHATE SERPL-MCNC: 3 MG/DL — SIGNIFICANT CHANGE UP (ref 2.5–4.5)
POTASSIUM SERPL-MCNC: 3.3 MMOL/L — LOW (ref 3.5–5.3)
POTASSIUM SERPL-MCNC: 3.6 MMOL/L — SIGNIFICANT CHANGE UP (ref 3.5–5.3)
POTASSIUM SERPL-SCNC: 3.3 MMOL/L — LOW (ref 3.5–5.3)
POTASSIUM SERPL-SCNC: 3.6 MMOL/L — SIGNIFICANT CHANGE UP (ref 3.5–5.3)
PROT SERPL-MCNC: 7.3 GM/DL — SIGNIFICANT CHANGE UP (ref 6–8.3)
SARS-COV-2 RNA SPEC QL NAA+PROBE: SIGNIFICANT CHANGE UP
SODIUM SERPL-SCNC: 135 MMOL/L — SIGNIFICANT CHANGE UP (ref 135–145)
SODIUM SERPL-SCNC: 136 MMOL/L — SIGNIFICANT CHANGE UP (ref 135–145)

## 2021-05-26 PROCEDURE — 99233 SBSQ HOSP IP/OBS HIGH 50: CPT

## 2021-05-26 PROCEDURE — 93306 TTE W/DOPPLER COMPLETE: CPT | Mod: 26

## 2021-05-26 PROCEDURE — 99232 SBSQ HOSP IP/OBS MODERATE 35: CPT

## 2021-05-26 RX ORDER — DEXTROSE MONOHYDRATE, SODIUM CHLORIDE, AND POTASSIUM CHLORIDE 50; .745; 4.5 G/1000ML; G/1000ML; G/1000ML
1000 INJECTION, SOLUTION INTRAVENOUS
Refills: 0 | Status: DISCONTINUED | OUTPATIENT
Start: 2021-05-26 | End: 2021-05-31

## 2021-05-26 RX ORDER — POTASSIUM CHLORIDE 20 MEQ
40 PACKET (EA) ORAL ONCE
Refills: 0 | Status: COMPLETED | OUTPATIENT
Start: 2021-05-26 | End: 2021-05-26

## 2021-05-26 RX ORDER — ONDANSETRON 8 MG/1
4 TABLET, FILM COATED ORAL ONCE
Refills: 0 | Status: DISCONTINUED | OUTPATIENT
Start: 2021-05-26 | End: 2021-06-03

## 2021-05-26 RX ADMIN — DEXTROSE MONOHYDRATE, SODIUM CHLORIDE, AND POTASSIUM CHLORIDE 125 MILLILITER(S): 50; .745; 4.5 INJECTION, SOLUTION INTRAVENOUS at 19:24

## 2021-05-26 RX ADMIN — SENNA PLUS 2 TABLET(S): 8.6 TABLET ORAL at 22:01

## 2021-05-26 RX ADMIN — ENOXAPARIN SODIUM 40 MILLIGRAM(S): 100 INJECTION SUBCUTANEOUS at 11:25

## 2021-05-26 RX ADMIN — SODIUM CHLORIDE 100 MILLILITER(S): 9 INJECTION INTRAMUSCULAR; INTRAVENOUS; SUBCUTANEOUS at 06:30

## 2021-05-26 RX ADMIN — Medication 1 ENEMA: at 20:57

## 2021-05-26 RX ADMIN — PANTOPRAZOLE SODIUM 40 MILLIGRAM(S): 20 TABLET, DELAYED RELEASE ORAL at 11:25

## 2021-05-26 RX ADMIN — Medication 40 MILLIEQUIVALENT(S): at 11:25
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
## 2021-05-26 NOTE — PROGRESS NOTE ADULT - ASSESSMENT
26 y/o Male with PMH of Gastroparesis presents with intractable N/V and severe intermittent "cramping" diffuse abdominal pain.   EKG: sinus 71bpm with inferior and inferolateral TWIs  No prior cardiac hx.  Denied chest pain/palpitations/dyspnea/syncope.  Robert neg x 2    ?if EKG changes 2/2 electrolyte imbalance  Replete lytes aggressively  Will obtain echo to eval LVEF.  Plain exercise stress test as outpt when GI sx resolve 26 y/o Male with PMH of Gastroparesis presents with intractable N/V and severe intermittent "cramping" diffuse abdominal pain.   EKG: sinus 71bpm with inferior and inferolateral TWIs  No prior cardiac hx.  Denied chest pain/palpitations/dyspnea/syncope.  Robert neg x 2    ?if EKG changes 2/2 electrolyte imbalance  Repeat EKG in am  Replete lytes aggressively  Will obtain echo to eval LVEF.  Plain exercise stress test as outpt when GI sx resolve
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
## 2021-05-26 NOTE — PROGRESS NOTE ADULT - PROBLEM SELECTOR PLAN 5
DVTp: low risk, early ambulation     Pt was counseled on risk factors of marijuana use and advised to discontinue as this may be contributing to his Gastroparesis, pt agrees to refrain from use.     Case discussed with Pt Mother.

## 2021-05-26 NOTE — PROGRESS NOTE ADULT - SUBJECTIVE AND OBJECTIVE BOX
Patient is a 25y old  Male who presents with a chief complaint of Nausea/vomiting (26 May 2021 17:11)    PAST MEDICAL & SURGICAL HISTORY:  No pertinent past medical history    Obesity    Gastroparesis    Status post fracture of left tibia    INTERVAL HISTORY:  	  MEDICATIONS:  MEDICATIONS  (STANDING):  dextrose 5% + sodium chloride 0.45% with potassium chloride 20 mEq/L 1000 milliLiter(s) (63 mL/Hr) IV Continuous <Continuous>  enoxaparin Injectable 40 milliGRAM(s) SubCutaneous daily  pantoprazole  Injectable 40 milliGRAM(s) IV Push daily  senna 2 Tablet(s) Oral at bedtime  sodium chloride 0.9%. 1000 milliLiter(s) (100 mL/Hr) IV Continuous <Continuous>    MEDICATIONS  (PRN):  acetaminophen   Tablet .. 650 milliGRAM(s) Oral every 6 hours PRN Temp greater or equal to 38C (100.4F), Mild Pain (1 - 3)  bisacodyl 5 milliGRAM(s) Oral every 12 hours PRN Constipation  diphenhydrAMINE 25 milliGRAM(s) Oral every 6 hours PRN Rash and/or Itching  morphine  - Injectable 1 milliGRAM(s) IV Push three times a day PRN Severe Pain (7 - 10)    Vitals:  T(F): 98.5 (05-26-21 @ 15:35), Max: 98.5 (05-26-21 @ 15:35)  HR: 62 (05-26-21 @ 15:35) (58 - 65)  BP: 116/78 (05-26-21 @ 15:35) (116/78 - 151/89)  RR: 18 (05-26-21 @ 15:35) (18 - 18)  SpO2: 95% (05-26-21 @ 15:35) (95% - 98%)    05-25 @ 07:01  -  05-26 @ 07:00  --------------------------------------------------------  IN:    dextrose 5% + sodium chloride 0.45% w/ Additives: 756 mL    Oral Fluid: 280 mL    sodium chloride 0.9%: 1200 mL  Total IN: 2236 mL    OUT:    Voided (mL): 150 mL  Total OUT: 150 mL    Total NET: 2086 mL    Weight (kg): 111 (05-25 @ 12:12)  BMI (kg/m2): 33.2 (05-25 @ 12:12)    PHYSICAL EXAM:  Neuro: Awake, responsive  CV: S1 S2 RRR  Lungs: CTABL  GI: Soft, BS +, ND, NT  Extremities: No edema    TELEMETRY: RSR    RADIOLOGY: < from: Xray Chest 1 View- PORTABLE-Urgent (Xray Chest 1 View- PORTABLE-Urgent .) (05.21.21 @ 14:33) >  Negative chest.    < end of copied text >  < from: CT Abdomen and Pelvis w/ Oral Cont and w/ IV Cont (05.22.21 @ 17:58) >  Bladder was not well-distended. Focal thickening in enhancement of the anterior superior bladder, recommend correlation with urinalysis and direct visualization.    No bowel obstruction. No gastric distention. Normal appendix.    < end of copied text >    DIAGNOSTIC TESTING:    [p ] Echocardiogram:     LABS:	 	    CARDIAC MARKERS:  Troponin I, Serum: <.015 ng/mL (05-25 @ 08:28)  Troponin I, Serum: <.015 ng/mL (05-24 @ 14:50)    26 May 2021 08:18    136    |  103    |  4      ----------------------------<  98     3.3     |  25     |  0.93   25 May 2021 20:30    138    |  104    |  5      ----------------------------<  100    3.5     |  25     |  0.79   25 May 2021 08:28    137    |  102    |  5      ----------------------------<  97     3.2     |  28     |  0.82     Ca    9.3        26 May 2021 08:18  Phos  3.0       26 May 2021 08:18  Mg     2.2       26 May 2021 08:18    TPro  7.3    /  Alb  3.4    /  TBili  0.9    /  DBili  x      /  AST  92     /  ALT  240    /  AlkPhos  69     26 May 2021 08:18                        14.5   5.87  )-----------( 334      ( 24 May 2021 19:30 )             41.5              Patient is a 25y old  Male who presents with a chief complaint of Nausea/vomiting (26 May 2021 17:11)    PAST MEDICAL & SURGICAL HISTORY:  No pertinent past medical history    Obesity    Gastroparesis    Status post fracture of left tibia    INTERVAL HISTORY: In no distress   	  MEDICATIONS:  MEDICATIONS  (STANDING):  dextrose 5% + sodium chloride 0.45% with potassium chloride 20 mEq/L 1000 milliLiter(s) (63 mL/Hr) IV Continuous <Continuous>  enoxaparin Injectable 40 milliGRAM(s) SubCutaneous daily  pantoprazole  Injectable 40 milliGRAM(s) IV Push daily  senna 2 Tablet(s) Oral at bedtime  sodium chloride 0.9%. 1000 milliLiter(s) (100 mL/Hr) IV Continuous <Continuous>    MEDICATIONS  (PRN):  acetaminophen   Tablet .. 650 milliGRAM(s) Oral every 6 hours PRN Temp greater or equal to 38C (100.4F), Mild Pain (1 - 3)  bisacodyl 5 milliGRAM(s) Oral every 12 hours PRN Constipation  diphenhydrAMINE 25 milliGRAM(s) Oral every 6 hours PRN Rash and/or Itching  morphine  - Injectable 1 milliGRAM(s) IV Push three times a day PRN Severe Pain (7 - 10)    Vitals:  T(F): 98.5 (05-26-21 @ 15:35), Max: 98.5 (05-26-21 @ 15:35)  HR: 62 (05-26-21 @ 15:35) (58 - 65)  BP: 116/78 (05-26-21 @ 15:35) (116/78 - 151/89)  RR: 18 (05-26-21 @ 15:35) (18 - 18)  SpO2: 95% (05-26-21 @ 15:35) (95% - 98%)    05-25 @ 07:01  -  05-26 @ 07:00  --------------------------------------------------------  IN:    dextrose 5% + sodium chloride 0.45% w/ Additives: 756 mL    Oral Fluid: 280 mL    sodium chloride 0.9%: 1200 mL  Total IN: 2236 mL    OUT:    Voided (mL): 150 mL  Total OUT: 150 mL    Total NET: 2086 mL    Weight (kg): 111 (05-25 @ 12:12)  BMI (kg/m2): 33.2 (05-25 @ 12:12)    PHYSICAL EXAM:  Neuro: Awake, responsive  CV: S1 S2 RRR  Lungs: CTABL  GI: Soft, BS +, ND, NT  Extremities: No edema    TELEMETRY: RSR    RADIOLOGY: < from: Xray Chest 1 View- PORTABLE-Urgent (Xray Chest 1 View- PORTABLE-Urgent .) (05.21.21 @ 14:33) >  Negative chest.    < end of copied text >  < from: CT Abdomen and Pelvis w/ Oral Cont and w/ IV Cont (05.22.21 @ 17:58) >  Bladder was not well-distended. Focal thickening in enhancement of the anterior superior bladder, recommend correlation with urinalysis and direct visualization.    No bowel obstruction. No gastric distention. Normal appendix.    < end of copied text >    DIAGNOSTIC TESTING:    [p ] Echocardiogram:     LABS:	 	    CARDIAC MARKERS:  Troponin I, Serum: <.015 ng/mL (05-25 @ 08:28)  Troponin I, Serum: <.015 ng/mL (05-24 @ 14:50)    26 May 2021 08:18    136    |  103    |  4      ----------------------------<  98     3.3     |  25     |  0.93   25 May 2021 20:30    138    |  104    |  5      ----------------------------<  100    3.5     |  25     |  0.79   25 May 2021 08:28    137    |  102    |  5      ----------------------------<  97     3.2     |  28     |  0.82     Ca    9.3        26 May 2021 08:18  Phos  3.0       26 May 2021 08:18  Mg     2.2       26 May 2021 08:18    TPro  7.3    /  Alb  3.4    /  TBili  0.9    /  DBili  x      /  AST  92     /  ALT  240    /  AlkPhos  69     26 May 2021 08:18                        14.5   5.87  )-----------( 334      ( 24 May 2021 19:30 )             41.5

## 2021-05-27 ENCOUNTER — RESULT REVIEW (OUTPATIENT)
Age: 26
End: 2021-05-27

## 2021-05-27 LAB
ALBUMIN SERPL ELPH-MCNC: 3.2 G/DL — LOW (ref 3.3–5)
ALP SERPL-CCNC: 65 U/L — SIGNIFICANT CHANGE UP (ref 40–120)
ALT FLD-CCNC: 250 U/L — HIGH (ref 12–78)
AMPHET UR-MCNC: NEGATIVE — SIGNIFICANT CHANGE UP
ANION GAP SERPL CALC-SCNC: 8 MMOL/L — SIGNIFICANT CHANGE UP (ref 5–17)
AST SERPL-CCNC: 86 U/L — HIGH (ref 15–37)
BARBITURATES UR SCN-MCNC: NEGATIVE — SIGNIFICANT CHANGE UP
BENZODIAZ UR-MCNC: NEGATIVE — SIGNIFICANT CHANGE UP
BILIRUB DIRECT SERPL-MCNC: 0.31 MG/DL — HIGH (ref 0.05–0.2)
BILIRUB INDIRECT FLD-MCNC: 0.6 MG/DL — SIGNIFICANT CHANGE UP (ref 0.2–1)
BILIRUB SERPL-MCNC: 0.9 MG/DL — SIGNIFICANT CHANGE UP (ref 0.2–1.2)
BUN SERPL-MCNC: 4 MG/DL — LOW (ref 7–23)
CALCIUM SERPL-MCNC: 9.1 MG/DL — SIGNIFICANT CHANGE UP (ref 8.5–10.1)
CHLORIDE SERPL-SCNC: 103 MMOL/L — SIGNIFICANT CHANGE UP (ref 96–108)
CO2 SERPL-SCNC: 26 MMOL/L — SIGNIFICANT CHANGE UP (ref 22–31)
COCAINE METAB.OTHER UR-MCNC: NEGATIVE — SIGNIFICANT CHANGE UP
CREAT SERPL-MCNC: 0.74 MG/DL — SIGNIFICANT CHANGE UP (ref 0.5–1.3)
CRP SERPL-MCNC: 18 MG/L — HIGH
GLUCOSE SERPL-MCNC: 88 MG/DL — SIGNIFICANT CHANGE UP (ref 70–99)
HCT VFR BLD CALC: 37.4 % — LOW (ref 39–50)
HGB BLD-MCNC: 13 G/DL — SIGNIFICANT CHANGE UP (ref 13–17)
MAGNESIUM SERPL-MCNC: 2.2 MG/DL — SIGNIFICANT CHANGE UP (ref 1.6–2.6)
MCHC RBC-ENTMCNC: 29 PG — SIGNIFICANT CHANGE UP (ref 27–34)
MCHC RBC-ENTMCNC: 34.8 GM/DL — SIGNIFICANT CHANGE UP (ref 32–36)
MCV RBC AUTO: 83.5 FL — SIGNIFICANT CHANGE UP (ref 80–100)
METHADONE UR-MCNC: NEGATIVE — SIGNIFICANT CHANGE UP
NRBC # BLD: 0 /100 WBCS — SIGNIFICANT CHANGE UP (ref 0–0)
OPIATES UR-MCNC: NEGATIVE — SIGNIFICANT CHANGE UP
PCP SPEC-MCNC: SIGNIFICANT CHANGE UP
PCP UR-MCNC: NEGATIVE — SIGNIFICANT CHANGE UP
PHOSPHATE SERPL-MCNC: 3.2 MG/DL — SIGNIFICANT CHANGE UP (ref 2.5–4.5)
PLATELET # BLD AUTO: 311 K/UL — SIGNIFICANT CHANGE UP (ref 150–400)
POTASSIUM SERPL-MCNC: 3.5 MMOL/L — SIGNIFICANT CHANGE UP (ref 3.5–5.3)
POTASSIUM SERPL-SCNC: 3.5 MMOL/L — SIGNIFICANT CHANGE UP (ref 3.5–5.3)
PROT SERPL-MCNC: 7 GM/DL — SIGNIFICANT CHANGE UP (ref 6–8.3)
RBC # BLD: 4.48 M/UL — SIGNIFICANT CHANGE UP (ref 4.2–5.8)
RBC # FLD: 13 % — SIGNIFICANT CHANGE UP (ref 10.3–14.5)
SODIUM SERPL-SCNC: 137 MMOL/L — SIGNIFICANT CHANGE UP (ref 135–145)
THC UR QL: POSITIVE — SIGNIFICANT CHANGE UP
WBC # BLD: 6.26 K/UL — SIGNIFICANT CHANGE UP (ref 3.8–10.5)
WBC # FLD AUTO: 6.26 K/UL — SIGNIFICANT CHANGE UP (ref 3.8–10.5)

## 2021-05-27 PROCEDURE — 99232 SBSQ HOSP IP/OBS MODERATE 35: CPT

## 2021-05-27 PROCEDURE — 88312 SPECIAL STAINS GROUP 1: CPT | Mod: 26

## 2021-05-27 PROCEDURE — 99223 1ST HOSP IP/OBS HIGH 75: CPT

## 2021-05-27 PROCEDURE — 88305 TISSUE EXAM BY PATHOLOGIST: CPT | Mod: 26

## 2021-05-27 PROCEDURE — 93010 ELECTROCARDIOGRAM REPORT: CPT

## 2021-05-27 RX ORDER — ONDANSETRON 8 MG/1
4 TABLET, FILM COATED ORAL ONCE
Refills: 0 | Status: DISCONTINUED | OUTPATIENT
Start: 2021-05-27 | End: 2021-05-27

## 2021-05-27 RX ORDER — SODIUM CHLORIDE 9 MG/ML
1000 INJECTION, SOLUTION INTRAVENOUS
Refills: 0 | Status: DISCONTINUED | OUTPATIENT
Start: 2021-05-27 | End: 2021-05-27

## 2021-05-27 RX ORDER — SUCRALFATE 1 G
1 TABLET ORAL
Refills: 0 | Status: DISCONTINUED | OUTPATIENT
Start: 2021-05-27 | End: 2021-06-06

## 2021-05-27 RX ADMIN — ENOXAPARIN SODIUM 40 MILLIGRAM(S): 100 INJECTION SUBCUTANEOUS at 12:22

## 2021-05-27 RX ADMIN — SODIUM CHLORIDE 50 MILLILITER(S): 9 INJECTION, SOLUTION INTRAVENOUS at 17:50

## 2021-05-27 RX ADMIN — DEXTROSE MONOHYDRATE, SODIUM CHLORIDE, AND POTASSIUM CHLORIDE 125 MILLILITER(S): 50; .745; 4.5 INJECTION, SOLUTION INTRAVENOUS at 08:35

## 2021-05-27 RX ADMIN — DEXTROSE MONOHYDRATE, SODIUM CHLORIDE, AND POTASSIUM CHLORIDE 125 MILLILITER(S): 50; .745; 4.5 INJECTION, SOLUTION INTRAVENOUS at 18:57

## 2021-05-27 RX ADMIN — PANTOPRAZOLE SODIUM 40 MILLIGRAM(S): 20 TABLET, DELAYED RELEASE ORAL at 12:22

## 2021-05-27 RX ADMIN — DEXTROSE MONOHYDRATE, SODIUM CHLORIDE, AND POTASSIUM CHLORIDE 125 MILLILITER(S): 50; .745; 4.5 INJECTION, SOLUTION INTRAVENOUS at 22:46

## 2021-05-27 RX ADMIN — DEXTROSE MONOHYDRATE, SODIUM CHLORIDE, AND POTASSIUM CHLORIDE 125 MILLILITER(S): 50; .745; 4.5 INJECTION, SOLUTION INTRAVENOUS at 04:03

## 2021-05-27 RX ADMIN — SENNA PLUS 2 TABLET(S): 8.6 TABLET ORAL at 21:24

## 2021-05-27 NOTE — PROGRESS NOTE ADULT - ASSESSMENT
24 y/o Male with PMH of Gastroparesis presents with intractable N/V and severe intermittent "cramping" diffuse abdominal pain.   EKG: sinus 71bpm with inferior and inferolateral TWIs  No prior cardiac hx.  Denied chest pain/palpitations/dyspnea/syncope.  Robert neg x 2    ?if EKG changes 2/2 electrolyte imbalance  Repeat EKG still with TWIs mostly in Inferior leads   Replete lytes aggressively  Echo unremarkable   GI w/u in progress   Plain exercise stress test as outpt when GI sx resolve 26 y/o Male with PMH of Gastroparesis presents with intractable N/V and severe intermittent "cramping" diffuse abdominal pain.   EKG: sinus 71bpm with inferior and inferolateral TWIs  No prior cardiac hx.  Denied chest pain/palpitations/dyspnea/syncope.  Robert neg x 2    ?if EKG changes 2/2 electrolyte imbalance, similar findings last admission earlier this month  Repeat EKG still with TWIs mostly in Inferior leads   Replete lytes aggressively  Echo unremarkable   GI w/u in progress   Plain exercise stress test as outpt when GI sx resolve
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
## 2021-05-27 NOTE — BRIEF OPERATIVE NOTE - NSICDXBRIEFPOSTOP_GEN_ALL_CORE_FT
POST-OP DIAGNOSIS:  Gastritis 27-May-2021 18:30:17  Preston Damon  Erosive esophagitis 27-May-2021 18:30:27  Preston Damon

## 2021-05-27 NOTE — BRIEF OPERATIVE NOTE - NSICDXBRIEFPREOP_GEN_ALL_CORE_FT
PRE-OP DIAGNOSIS:  Nausea & vomiting 27-May-2021 18:29:51  Preston Damon  Abdominal pain 27-May-2021 18:30:00  Preston Damon

## 2021-05-27 NOTE — PROGRESS NOTE ADULT - SUBJECTIVE AND OBJECTIVE BOX
Procedure:           Upper GI endoscopy with biopsy 05-27-21 @ 18:31    Indications:                N/V, abdominal pain     Monitored Anesthesia Care Provided by :     ____________________________________________________________________________________________________  Procedure:           Pre-Anesthesia Assessment:                       - Prior to the procedure, a History and Physical was performed, and patient                        medications and allergies were reviewed. The patient is competent. The risks                        and benefits of the procedure and the sedation options and risks were                        discussed with the patient. All questions were answered and informed consent                        was obtained. Patient identification and proposed procedure were verified by                        the physician, the nurse and the anesthesiologist in the procedure room.                        Mental Status Examination:  alert and oriented. Airway Examination: normal                        oropharyngeal airway and neck mobility. Respiratory Examination: clear to                        auscultation. CV Examination: normal. Prophylactic Antibiotics: -The patient                        does not require prophylactic antibiotics.                        Grade Assessment: - After                        reviewing the risks and benefits, the patient was deemed in satisfactory                        condition to undergo the procedure. The anesthesia plan was to use monitored                        anesthesia care (MAC). Immediately prior to administration of medications,                        the patient was re-assessed for adequacy to receive sedatives. The heart        		     rate, respiratory rate, oxygen saturations, blood pressure, adequacy of                        pulmonary ventilation, and response to care were monitored throughout the                        procedure. The physical status of the patient was re-assessed after the                        procedure.                       After obtaining informed consent, the endoscope was passed under direct                        vision. Throughout the procedure, the patient's blood pressure, pulse, and                        oxygen saturations were monitored continuously. The Endoscope was introduced                        through the mouth, and advanced to the second part of duodenum. retroflexion was done in the stomach. The upper GI                        endoscopy was accomplished with ease. The patient tolerated the procedure                        well.    ESOPHAGUS:  mild / moderate esophagitis s/p biopsy    STOMACH: antral gastritis s/p biopsy    DUODENUM:  mild duodenitis       Assessment : As Above    PLAN :  HIDA w/ CCK, PPI Carafate

## 2021-05-27 NOTE — PROGRESS NOTE ADULT - SUBJECTIVE AND OBJECTIVE BOX
Patient is a 25y old  Male who presents with a chief complaint of Nausea/vomiting (26 May 2021 18:28)    PAST MEDICAL & SURGICAL HISTORY:  No pertinent past medical history    Obesity    Gastroparesis    Status post fracture of left tibia    No significant past surgical history    INTERVAL HISTORY: in no distress   	  MEDICATIONS:  MEDICATIONS  (STANDING):  dextrose 5% + sodium chloride 0.45% with potassium chloride 20 mEq/L 1000 milliLiter(s) (125 mL/Hr) IV Continuous <Continuous>  enoxaparin Injectable 40 milliGRAM(s) SubCutaneous daily  pantoprazole  Injectable 40 milliGRAM(s) IV Push daily  senna 2 Tablet(s) Oral at bedtime    MEDICATIONS  (PRN):  acetaminophen   Tablet .. 650 milliGRAM(s) Oral every 6 hours PRN Temp greater or equal to 38C (100.4F), Mild Pain (1 - 3)  bisacodyl 5 milliGRAM(s) Oral every 12 hours PRN Constipation  diphenhydrAMINE 25 milliGRAM(s) Oral every 6 hours PRN Rash and/or Itching  morphine  - Injectable 1 milliGRAM(s) IV Push three times a day PRN Severe Pain (7 - 10)  ondansetron Injectable 4 milliGRAM(s) IV Push once PRN Nausea and/or Vomiting  saline laxative (FLEET) Rectal Enema 1 Enema Rectal daily PRN constipation    Vitals:  T(F): 98.6 (05-27-21 @ 11:09), Max: 98.7 (05-26-21 @ 23:40)  HR: 77 (05-27-21 @ 11:09) (61 - 77)  BP: 123/71 (05-27-21 @ 11:09) (116/78 - 138/96)  RR: 17 (05-27-21 @ 11:09) (17 - 18)  SpO2: 98% (05-27-21 @ 11:09) (94% - 98%)    05-26 @ 07:01  -  05-27 @ 07:00  --------------------------------------------------------  IN:    dextrose 5% + sodium chloride 0.45% w/ Additives: 1500 mL  Total IN: 1500 mL    OUT:  Total OUT: 0 mL    Total NET: 1500 mL    Weight (kg): 111 (05-25 @ 12:12)  BMI (kg/m2): 33.2 (05-25 @ 12:12)    PHYSICAL EXAM:  Neuro: Awake, responsive  CV: S1 S2 RRR  Lungs: CTABL  GI: Soft, BS +, ND, NT  Extremities: No edema    RADIOLOGY: < from: Xray Chest 1 View- PORTABLE-Urgent (Xray Chest 1 View- PORTABLE-Urgent .) (05.21.21 @ 14:33) >  Negative chest.    < end of copied text >    DIAGNOSTIC TESTING:    [ x] Echocardiogram: < from: TTE Echo Complete w/o Contrast w/ Doppler (05.26.21 @ 11:49) >  Left Ventricle: Normal left ventricular size and wall thicknesses, with normal systolic and diastolic function.  Global LV systolic function was normal. Left ventricular ejection fraction, by visual estimation, is 60 to 65%.  Right Ventricle: Normalright ventricular size and function.  Left Atrium: The left atrium is normal in size.  Right Atrium: The right atrium is normal in size.  Pericardium: There is no evidence of pericardial effusion.  Mitral Valve: Structurally normal mitral valve, withnormal leaflet excursion. No evidence of mitral valve regurgitation is seen.  Tricuspid Valve: Structurally normal tricuspid valve, with normal leaflet excursion. Trivial tricuspid regurgitation is visualized.  Aortic Valve: Normal trileaflet aortic valve with normal opening. Peak transaortic gradient equals 8.0 mmHg, mean transaortic gradient equals 4.2 mmHg, the calculated aortic valve area equals 4.30 cm² by the continuity equation consistent with normally opening aortic valve. No evidence of aortic valve regurgitation is seen.  Pulmonic Valve: Structurally normal pulmonic valve, with normal leaflet excursion. Mild pulmonic valve regurgitation.  Aorta: The aortic root and ascending aorta are structurally normal, with no evidence of dilitation.  Pulmonary Artery: The main pulmonary artery is normal in size.      Summary:   1. Left ventricular ejection fraction, by visual estimation, is 60 to 65%.   2. Normal global left ventricular systolic function.   3. No evidence of mitral valve regurgitation.   4. Mild pulmonic valve regurgitation.    < end of copied text >    LABS:	 	    CARDIAC MARKERS:  Troponin I, Serum: <.015 ng/mL (05-25 @ 08:28)  Troponin I, Serum: <.015 ng/mL (05-24 @ 14:50)    27 May 2021 05:02    137    |  103    |  4      ----------------------------<  88     3.5     |  26     |  0.74   26 May 2021 17:48    135    |  102    |  4      ----------------------------<  97     3.6     |  27     |  0.76   26 May 2021 08:18    136    |  103    |  4      ----------------------------<  98     3.3     |  25     |  0.93     Ca    9.1        27 May 2021 05:02  Phos  3.2       27 May 2021 05:02  Mg     2.2       27 May 2021 05:02    TPro  7.0    /  Alb  3.2    /  TBili  0.9    /  DBili  .31    /  AST  86     /  ALT  250    /  AlkPhos  65     27 May 2021 05:02                          13.0   6.26  )-----------( 311      ( 27 May 2021 05:02 )             37.4 ,                       14.5   5.87  )-----------( 334      ( 24 May 2021 19:30 )             41.5                  Patient is a 25y old  Male who presents with a chief complaint of Nausea/vomiting (26 May 2021 18:28)    PAST MEDICAL & SURGICAL HISTORY:  No pertinent past medical history    Obesity    Gastroparesis    Status post fracture of left tibia    No significant past surgical history  INTERVAL HISTORY: in no distress, + N/V  	  MEDICATIONS:  MEDICATIONS  (STANDING):  dextrose 5% + sodium chloride 0.45% with potassium chloride 20 mEq/L 1000 milliLiter(s) (125 mL/Hr) IV Continuous <Continuous>  enoxaparin Injectable 40 milliGRAM(s) SubCutaneous daily  pantoprazole  Injectable 40 milliGRAM(s) IV Push daily  senna 2 Tablet(s) Oral at bedtime    MEDICATIONS  (PRN):  acetaminophen   Tablet .. 650 milliGRAM(s) Oral every 6 hours PRN Temp greater or equal to 38C (100.4F), Mild Pain (1 - 3)  bisacodyl 5 milliGRAM(s) Oral every 12 hours PRN Constipation  diphenhydrAMINE 25 milliGRAM(s) Oral every 6 hours PRN Rash and/or Itching  morphine  - Injectable 1 milliGRAM(s) IV Push three times a day PRN Severe Pain (7 - 10)  ondansetron Injectable 4 milliGRAM(s) IV Push once PRN Nausea and/or Vomiting  saline laxative (FLEET) Rectal Enema 1 Enema Rectal daily PRN constipation    Vitals:  T(F): 98.6 (05-27-21 @ 11:09), Max: 98.7 (05-26-21 @ 23:40)  HR: 77 (05-27-21 @ 11:09) (61 - 77)  BP: 123/71 (05-27-21 @ 11:09) (116/78 - 138/96)  RR: 17 (05-27-21 @ 11:09) (17 - 18)  SpO2: 98% (05-27-21 @ 11:09) (94% - 98%)    05-26 @ 07:01  -  05-27 @ 07:00  --------------------------------------------------------  IN:    dextrose 5% + sodium chloride 0.45% w/ Additives: 1500 mL  Total IN: 1500 mL    OUT:  Total OUT: 0 mL    Total NET: 1500 mL    Weight (kg): 111 (05-25 @ 12:12)  BMI (kg/m2): 33.2 (05-25 @ 12:12)    PHYSICAL EXAM:  Neuro: Awake, responsive  CV: S1 S2 RRR  Lungs: CTABL  GI: Soft, BS +, ND  Extremities: No edema    RADIOLOGY: < from: Xray Chest 1 View- PORTABLE-Urgent (Xray Chest 1 View- PORTABLE-Urgent .) (05.21.21 @ 14:33) >  Negative chest.    < end of copied text >    DIAGNOSTIC TESTING:    [ x] Echocardiogram: < from: TTE Echo Complete w/o Contrast w/ Doppler (05.26.21 @ 11:49) >  Left Ventricle: Normal left ventricular size and wall thicknesses, with normal systolic and diastolic function.  Global LV systolic function was normal. Left ventricular ejection fraction, by visual estimation, is 60 to 65%.  Right Ventricle: Normalright ventricular size and function.  Left Atrium: The left atrium is normal in size.  Right Atrium: The right atrium is normal in size.  Pericardium: There is no evidence of pericardial effusion.  Mitral Valve: Structurally normal mitral valve, withnormal leaflet excursion. No evidence of mitral valve regurgitation is seen.  Tricuspid Valve: Structurally normal tricuspid valve, with normal leaflet excursion. Trivial tricuspid regurgitation is visualized.  Aortic Valve: Normal trileaflet aortic valve with normal opening. Peak transaortic gradient equals 8.0 mmHg, mean transaortic gradient equals 4.2 mmHg, the calculated aortic valve area equals 4.30 cm² by the continuity equation consistent with normally opening aortic valve. No evidence of aortic valve regurgitation is seen.  Pulmonic Valve: Structurally normal pulmonic valve, with normal leaflet excursion. Mild pulmonic valve regurgitation.  Aorta: The aortic root and ascending aorta are structurally normal, with no evidence of dilitation.  Pulmonary Artery: The main pulmonary artery is normal in size.      Summary:   1. Left ventricular ejection fraction, by visual estimation, is 60 to 65%.   2. Normal global left ventricular systolic function.   3. No evidence of mitral valve regurgitation.   4. Mild pulmonic valve regurgitation.    < end of copied text >    LABS:	 	    CARDIAC MARKERS:  Troponin I, Serum: <.015 ng/mL (05-25 @ 08:28)  Troponin I, Serum: <.015 ng/mL (05-24 @ 14:50)    27 May 2021 05:02    137    |  103    |  4      ----------------------------<  88     3.5     |  26     |  0.74   26 May 2021 17:48    135    |  102    |  4      ----------------------------<  97     3.6     |  27     |  0.76   26 May 2021 08:18    136    |  103    |  4      ----------------------------<  98     3.3     |  25     |  0.93     Ca    9.1        27 May 2021 05:02  Phos  3.2       27 May 2021 05:02  Mg     2.2       27 May 2021 05:02    TPro  7.0    /  Alb  3.2    /  TBili  0.9    /  DBili  .31    /  AST  86     /  ALT  250    /  AlkPhos  65     27 May 2021 05:02                          13.0   6.26  )-----------( 311      ( 27 May 2021 05:02 )             37.4 ,                       14.5   5.87  )-----------( 334      ( 24 May 2021 19:30 )             41.5

## 2021-05-28 DIAGNOSIS — R74.8 ABNORMAL LEVELS OF OTHER SERUM ENZYMES: ICD-10-CM

## 2021-05-28 LAB
ALBUMIN SERPL ELPH-MCNC: 3.2 G/DL — LOW (ref 3.3–5)
ALP SERPL-CCNC: 69 U/L — SIGNIFICANT CHANGE UP (ref 40–120)
ALT FLD-CCNC: 294 U/L — HIGH (ref 12–78)
ANION GAP SERPL CALC-SCNC: 6 MMOL/L — SIGNIFICANT CHANGE UP (ref 5–17)
AST SERPL-CCNC: 105 U/L — HIGH (ref 15–37)
BILIRUB SERPL-MCNC: 1 MG/DL — SIGNIFICANT CHANGE UP (ref 0.2–1.2)
BUN SERPL-MCNC: 4 MG/DL — LOW (ref 7–23)
CALCIUM SERPL-MCNC: 9.1 MG/DL — SIGNIFICANT CHANGE UP (ref 8.5–10.1)
CHLORIDE SERPL-SCNC: 101 MMOL/L — SIGNIFICANT CHANGE UP (ref 96–108)
CO2 SERPL-SCNC: 28 MMOL/L — SIGNIFICANT CHANGE UP (ref 22–31)
CREAT SERPL-MCNC: 0.83 MG/DL — SIGNIFICANT CHANGE UP (ref 0.5–1.3)
GLUCOSE BLDC GLUCOMTR-MCNC: 102 MG/DL — HIGH (ref 70–99)
GLUCOSE SERPL-MCNC: 108 MG/DL — HIGH (ref 70–99)
HCT VFR BLD CALC: 36.9 % — LOW (ref 39–50)
HGB BLD-MCNC: 13 G/DL — SIGNIFICANT CHANGE UP (ref 13–17)
MAGNESIUM SERPL-MCNC: 2 MG/DL — SIGNIFICANT CHANGE UP (ref 1.6–2.6)
MCHC RBC-ENTMCNC: 28.9 PG — SIGNIFICANT CHANGE UP (ref 27–34)
MCHC RBC-ENTMCNC: 35.2 GM/DL — SIGNIFICANT CHANGE UP (ref 32–36)
MCV RBC AUTO: 82 FL — SIGNIFICANT CHANGE UP (ref 80–100)
NRBC # BLD: 0 /100 WBCS — SIGNIFICANT CHANGE UP (ref 0–0)
PHOSPHATE SERPL-MCNC: 2.9 MG/DL — SIGNIFICANT CHANGE UP (ref 2.5–4.5)
PLATELET # BLD AUTO: 314 K/UL — SIGNIFICANT CHANGE UP (ref 150–400)
POTASSIUM SERPL-MCNC: 3.4 MMOL/L — LOW (ref 3.5–5.3)
POTASSIUM SERPL-SCNC: 3.4 MMOL/L — LOW (ref 3.5–5.3)
PROT SERPL-MCNC: 7.2 GM/DL — SIGNIFICANT CHANGE UP (ref 6–8.3)
RBC # BLD: 4.5 M/UL — SIGNIFICANT CHANGE UP (ref 4.2–5.8)
RBC # FLD: 12.9 % — SIGNIFICANT CHANGE UP (ref 10.3–14.5)
SODIUM SERPL-SCNC: 135 MMOL/L — SIGNIFICANT CHANGE UP (ref 135–145)
WBC # BLD: 5.84 K/UL — SIGNIFICANT CHANGE UP (ref 3.8–10.5)
WBC # FLD AUTO: 5.84 K/UL — SIGNIFICANT CHANGE UP (ref 3.8–10.5)

## 2021-05-28 PROCEDURE — 78227 HEPATOBIL SYST IMAGE W/DRUG: CPT | Mod: 26

## 2021-05-28 PROCEDURE — 99232 SBSQ HOSP IP/OBS MODERATE 35: CPT

## 2021-05-28 PROCEDURE — 99233 SBSQ HOSP IP/OBS HIGH 50: CPT

## 2021-05-28 RX ORDER — SINCALIDE 5 UG/5ML
2.2 INJECTION, POWDER, LYOPHILIZED, FOR SOLUTION INTRAVENOUS ONCE
Refills: 0 | Status: COMPLETED | OUTPATIENT
Start: 2021-05-28 | End: 2021-05-28

## 2021-05-28 RX ADMIN — Medication 1 GRAM(S): at 17:35

## 2021-05-28 RX ADMIN — PANTOPRAZOLE SODIUM 40 MILLIGRAM(S): 20 TABLET, DELAYED RELEASE ORAL at 11:54

## 2021-05-28 RX ADMIN — Medication 1 GRAM(S): at 02:28

## 2021-05-28 RX ADMIN — Medication 1 GRAM(S): at 05:55

## 2021-05-28 RX ADMIN — DEXTROSE MONOHYDRATE, SODIUM CHLORIDE, AND POTASSIUM CHLORIDE 125 MILLILITER(S): 50; .745; 4.5 INJECTION, SOLUTION INTRAVENOUS at 11:57

## 2021-05-28 RX ADMIN — Medication 1 GRAM(S): at 23:33

## 2021-05-28 RX ADMIN — SINCALIDE 52.2 MICROGRAM(S): 5 INJECTION, POWDER, LYOPHILIZED, FOR SOLUTION INTRAVENOUS at 09:57

## 2021-05-28 RX ADMIN — ENOXAPARIN SODIUM 40 MILLIGRAM(S): 100 INJECTION SUBCUTANEOUS at 11:54

## 2021-05-28 RX ADMIN — SENNA PLUS 2 TABLET(S): 8.6 TABLET ORAL at 21:48

## 2021-05-28 RX ADMIN — DEXTROSE MONOHYDRATE, SODIUM CHLORIDE, AND POTASSIUM CHLORIDE 125 MILLILITER(S): 50; .745; 4.5 INJECTION, SOLUTION INTRAVENOUS at 06:43
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
## 2021-05-28 NOTE — PROGRESS NOTE ADULT - SUBJECTIVE AND OBJECTIVE BOX
Patient is a 25y old  Male who presents with a chief complaint of Nausea/vomiting (27 May 2021 20:05)    PAST MEDICAL & SURGICAL HISTORY:  No pertinent past medical history    Obesity    Gastroparesis    Status post fracture of left tibia    No significant past surgical history    INTERVAL HISTORY:  	  MEDICATIONS:  MEDICATIONS  (STANDING):  dextrose 5% + sodium chloride 0.45% with potassium chloride 20 mEq/L 1000 milliLiter(s) (125 mL/Hr) IV Continuous <Continuous>  enoxaparin Injectable 40 milliGRAM(s) SubCutaneous daily  pantoprazole  Injectable 40 milliGRAM(s) IV Push daily  senna 2 Tablet(s) Oral at bedtime  sucralfate 1 Gram(s) Oral four times a day    MEDICATIONS  (PRN):  acetaminophen   Tablet .. 650 milliGRAM(s) Oral every 6 hours PRN Temp greater or equal to 38C (100.4F), Mild Pain (1 - 3)  bisacodyl 5 milliGRAM(s) Oral every 12 hours PRN Constipation  diphenhydrAMINE 25 milliGRAM(s) Oral every 6 hours PRN Rash and/or Itching  morphine  - Injectable 1 milliGRAM(s) IV Push three times a day PRN Severe Pain (7 - 10)  ondansetron Injectable 4 milliGRAM(s) IV Push once PRN Nausea and/or Vomiting  saline laxative (FLEET) Rectal Enema 1 Enema Rectal daily PRN constipation      Vitals:  T(F): 97.8 (05-28-21 @ 05:13), Max: 98.4 (05-27-21 @ 21:43)  HR: 87 (05-28-21 @ 05:13) (61 - 87)  BP: 157/93 (05-28-21 @ 05:13) (129/87 - 159/90)  RR: 18 (05-28-21 @ 05:13) (18 - 25)  SpO2: 98% (05-28-21 @ 05:13) (95% - 100%)  Wt(kg): --    05-27 @ 07:01  -  05-28 @ 07:00  --------------------------------------------------------  IN:    dextrose 5% + sodium chloride 0.45% w/ Additives: 1500 mL  Total IN: 1500 mL    OUT:  Total OUT: 0 mL    Total NET: 1500 mL        ECG:  	< from: 12 Lead ECG (05.27.21 @ 09:23) >  Ventricular Rate 65 BPM    Atrial Rate 65 BPM    P-R Interval 140 ms    QRS Duration 86 ms    Q-T Interval 426 ms    QTC Calculation(Bazett) 443 ms    P Axis 49 degrees    R Axis 34 degrees    T Axis 4 degrees    Diagnosis Line Normal sinus rhythm  Nonspecific ST and T wave abnormality  Abnormal ECG  When compared with ECG of 24-MAY-2021 13:27,  T wave inversion no longer evident in Lateral leads    < end of copied text >    RADIOLOGY:   < from: Xray Chest 1 View- PORTABLE-Urgent (Xray Chest 1 View- PORTABLE-Urgent .) (05.21.21 @ 14:33) >  INTERPRETATION:  AP semierect chest on May 21, 2021 at 2:09 PM. Patient has vomiting and dehydration.    COMPARISON: None available.    Heart size iswithin normal limits.    The lung fields and pleural surfaces are unremarkable.    No free intraperitoneal air is evident.    IMPRESSION: Negative chest.    < end of copied text >    DIAGNOSTIC TESTING:    [x ] Echocardiogram: < from: TTE Echo Complete w/o Contrast w/ Doppler (05.26.21 @ 11:49) >  Summary:   1. Left ventricular ejection fraction, by visual estimation, is 60 to 65%.   2. Normal global left ventricular systolic function.   3. No evidence of mitral valve regurgitation.   4. Mild pulmonic valve regurgitation.    < end of copied text >       LABS:	 	    28 May 2021 07:52    135    |  101    |  4      ----------------------------<  108    3.4     |  28     |  0.83   27 May 2021 05:02    137    |  103    |  4      ----------------------------<  88     3.5     |  26     |  0.74   26 May 2021 17:48    135    |  102    |  4      ----------------------------<  97     3.6     |  27     |  0.76     Ca    9.1        28 May 2021 07:52  Phos  2.9       28 May 2021 07:52  Mg     2.0       28 May 2021 07:52    TPro  7.2    /  Alb  3.2    /  TBili  1.0    /  DBili  x      /  AST  105    /  ALT  294    /  AlkPhos  69     28 May 2021 07:52                          13.0   5.84  )-----------( 314      ( 28 May 2021 07:52 )             36.9 ,                       13.0   6.26  )-----------( 311      ( 27 May 2021 05:02 )             37.4   proBNP:   Lipid Profile:   HgA1c:   TSH:
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
## 2021-05-28 NOTE — PROGRESS NOTE ADULT - PROBLEM SELECTOR PLAN 5
CT abdomen: Bladder was not well-distended. Focal thickening in enhancement of the anterior superior bladder, recommend correlation with urinalysis and direct visualization.  UA: positive markers, Urine Cx: negative  Urology consult appreciated   Pt will most likely need outpatient followup

## 2021-05-28 NOTE — PROGRESS NOTE ADULT - ATTENDING COMMENTS
agree with above note and plan. signing off please call back prn.
agree with above note and plan. Signing off. please call back prn.

## 2021-05-28 NOTE — PROGRESS NOTE ADULT - ASSESSMENT
26 y/o Male with PMH of Gastroparesis presents with intractable N/V and severe intermittent "cramping" diffuse abdominal pain.   EKG: sinus 71bpm with inferior and inferolateral TWIs  No prior cardiac hx.  Denied chest pain/palpitations/dyspnea/syncope.  Robert neg x 2    ?if EKG changes 2/2 electrolyte imbalance, similar findings last admission earlier this month  Repeat EKG still with TWIs mostly in Inferior leads   Replete lytes aggressively  Echo unremarkable   GI w/u in progress   Plain exercise stress test as outpt when GI sx resolve  Follow up with cardiology 2 weeks after discharge, Will sign off now. Please call if have questions.
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
## 2021-05-29 LAB
ALBUMIN SERPL ELPH-MCNC: 3.2 G/DL — LOW (ref 3.3–5)
ALP SERPL-CCNC: 73 U/L — SIGNIFICANT CHANGE UP (ref 40–120)
ALT FLD-CCNC: 359 U/L — HIGH (ref 12–78)
ANION GAP SERPL CALC-SCNC: 7 MMOL/L — SIGNIFICANT CHANGE UP (ref 5–17)
AST SERPL-CCNC: 103 U/L — HIGH (ref 15–37)
BILIRUB DIRECT SERPL-MCNC: 0.33 MG/DL — HIGH (ref 0.05–0.2)
BILIRUB INDIRECT FLD-MCNC: 0.5 MG/DL — SIGNIFICANT CHANGE UP (ref 0.2–1)
BILIRUB SERPL-MCNC: 0.8 MG/DL — SIGNIFICANT CHANGE UP (ref 0.2–1.2)
BUN SERPL-MCNC: 4 MG/DL — LOW (ref 7–23)
CALCIUM SERPL-MCNC: 9.2 MG/DL — SIGNIFICANT CHANGE UP (ref 8.5–10.1)
CHLORIDE SERPL-SCNC: 103 MMOL/L — SIGNIFICANT CHANGE UP (ref 96–108)
CO2 SERPL-SCNC: 26 MMOL/L — SIGNIFICANT CHANGE UP (ref 22–31)
CREAT SERPL-MCNC: 0.71 MG/DL — SIGNIFICANT CHANGE UP (ref 0.5–1.3)
CRP SERPL-MCNC: 30 MG/L — HIGH
EBV EA AB SER IA-ACNC: 27 U/ML — HIGH
EBV EA AB TITR SER IF: POSITIVE
EBV EA IGG SER-ACNC: POSITIVE
EBV NA IGG SER IA-ACNC: 48.9 U/ML — HIGH
EBV PATRN SPEC IB-IMP: SIGNIFICANT CHANGE UP
EBV VCA IGG AVIDITY SER QL IA: POSITIVE
EBV VCA IGM SER IA-ACNC: 387 U/ML — HIGH
EBV VCA IGM SER IA-ACNC: <10 U/ML — SIGNIFICANT CHANGE UP
EBV VCA IGM TITR FLD: NEGATIVE — SIGNIFICANT CHANGE UP
GLUCOSE SERPL-MCNC: 102 MG/DL — HIGH (ref 70–99)
HAV IGM SER-ACNC: SIGNIFICANT CHANGE UP
HBV CORE IGM SER-ACNC: SIGNIFICANT CHANGE UP
HBV SURFACE AG SER-ACNC: SIGNIFICANT CHANGE UP
HCT VFR BLD CALC: 38.5 % — LOW (ref 39–50)
HCV AB S/CO SERPL IA: 0.42 S/CO — SIGNIFICANT CHANGE UP (ref 0–0.99)
HCV AB SERPL-IMP: SIGNIFICANT CHANGE UP
HGB BLD-MCNC: 13.3 G/DL — SIGNIFICANT CHANGE UP (ref 13–17)
MAGNESIUM SERPL-MCNC: 2.1 MG/DL — SIGNIFICANT CHANGE UP (ref 1.6–2.6)
MCHC RBC-ENTMCNC: 28.9 PG — SIGNIFICANT CHANGE UP (ref 27–34)
MCHC RBC-ENTMCNC: 34.5 GM/DL — SIGNIFICANT CHANGE UP (ref 32–36)
MCV RBC AUTO: 83.7 FL — SIGNIFICANT CHANGE UP (ref 80–100)
NRBC # BLD: 0 /100 WBCS — SIGNIFICANT CHANGE UP (ref 0–0)
PHOSPHATE SERPL-MCNC: 2.6 MG/DL — SIGNIFICANT CHANGE UP (ref 2.5–4.5)
PLATELET # BLD AUTO: 327 K/UL — SIGNIFICANT CHANGE UP (ref 150–400)
POTASSIUM SERPL-MCNC: 3.8 MMOL/L — SIGNIFICANT CHANGE UP (ref 3.5–5.3)
POTASSIUM SERPL-SCNC: 3.8 MMOL/L — SIGNIFICANT CHANGE UP (ref 3.5–5.3)
PROT SERPL-MCNC: 7.4 GM/DL — SIGNIFICANT CHANGE UP (ref 6–8.3)
RBC # BLD: 4.6 M/UL — SIGNIFICANT CHANGE UP (ref 4.2–5.8)
RBC # FLD: 13 % — SIGNIFICANT CHANGE UP (ref 10.3–14.5)
SODIUM SERPL-SCNC: 136 MMOL/L — SIGNIFICANT CHANGE UP (ref 135–145)
WBC # BLD: 6.68 K/UL — SIGNIFICANT CHANGE UP (ref 3.8–10.5)
WBC # FLD AUTO: 6.68 K/UL — SIGNIFICANT CHANGE UP (ref 3.8–10.5)

## 2021-05-29 PROCEDURE — 99233 SBSQ HOSP IP/OBS HIGH 50: CPT

## 2021-05-29 RX ORDER — POTASSIUM CHLORIDE 20 MEQ
10 PACKET (EA) ORAL
Refills: 0 | Status: COMPLETED | OUTPATIENT
Start: 2021-05-29 | End: 2021-05-29

## 2021-05-29 RX ADMIN — Medication 1 GRAM(S): at 23:22

## 2021-05-29 RX ADMIN — DEXTROSE MONOHYDRATE, SODIUM CHLORIDE, AND POTASSIUM CHLORIDE 125 MILLILITER(S): 50; .745; 4.5 INJECTION, SOLUTION INTRAVENOUS at 17:12

## 2021-05-29 RX ADMIN — Medication 1 GRAM(S): at 17:09

## 2021-05-29 RX ADMIN — Medication 1 GRAM(S): at 05:41

## 2021-05-29 RX ADMIN — DEXTROSE MONOHYDRATE, SODIUM CHLORIDE, AND POTASSIUM CHLORIDE 125 MILLILITER(S): 50; .745; 4.5 INJECTION, SOLUTION INTRAVENOUS at 02:20

## 2021-05-29 RX ADMIN — Medication 100 MILLIEQUIVALENT(S): at 21:53

## 2021-05-29 RX ADMIN — Medication 100 MILLIEQUIVALENT(S): at 23:20

## 2021-05-29 RX ADMIN — ENOXAPARIN SODIUM 40 MILLIGRAM(S): 100 INJECTION SUBCUTANEOUS at 11:32

## 2021-05-29 RX ADMIN — Medication 100 MILLIEQUIVALENT(S): at 20:03

## 2021-05-29 RX ADMIN — Medication 1 GRAM(S): at 11:32

## 2021-05-29 RX ADMIN — PANTOPRAZOLE SODIUM 40 MILLIGRAM(S): 20 TABLET, DELAYED RELEASE ORAL at 11:31
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
## 2021-05-30 LAB
ALBUMIN SERPL ELPH-MCNC: 3.4 G/DL — SIGNIFICANT CHANGE UP (ref 3.3–5)
ALP SERPL-CCNC: 78 U/L — SIGNIFICANT CHANGE UP (ref 40–120)
ALT FLD-CCNC: 366 U/L — HIGH (ref 12–78)
AMMONIA BLD-MCNC: 15 UMOL/L — SIGNIFICANT CHANGE UP (ref 11–32)
ANA TITR SER: NEGATIVE — SIGNIFICANT CHANGE UP
ANION GAP SERPL CALC-SCNC: 6 MMOL/L — SIGNIFICANT CHANGE UP (ref 5–17)
AST SERPL-CCNC: 93 U/L — HIGH (ref 15–37)
BILIRUB SERPL-MCNC: 0.8 MG/DL — SIGNIFICANT CHANGE UP (ref 0.2–1.2)
BUN SERPL-MCNC: 4 MG/DL — LOW (ref 7–23)
CALCIUM SERPL-MCNC: 9.5 MG/DL — SIGNIFICANT CHANGE UP (ref 8.5–10.1)
CHLORIDE SERPL-SCNC: 101 MMOL/L — SIGNIFICANT CHANGE UP (ref 96–108)
CO2 SERPL-SCNC: 27 MMOL/L — SIGNIFICANT CHANGE UP (ref 22–31)
CREAT SERPL-MCNC: 0.68 MG/DL — SIGNIFICANT CHANGE UP (ref 0.5–1.3)
CULTURE RESULTS: SIGNIFICANT CHANGE UP
CULTURE RESULTS: SIGNIFICANT CHANGE UP
GLUCOSE SERPL-MCNC: 112 MG/DL — HIGH (ref 70–99)
MAGNESIUM SERPL-MCNC: 2.3 MG/DL — SIGNIFICANT CHANGE UP (ref 1.6–2.6)
PHOSPHATE SERPL-MCNC: 2.6 MG/DL — SIGNIFICANT CHANGE UP (ref 2.5–4.5)
POTASSIUM SERPL-MCNC: 3.9 MMOL/L — SIGNIFICANT CHANGE UP (ref 3.5–5.3)
POTASSIUM SERPL-SCNC: 3.9 MMOL/L — SIGNIFICANT CHANGE UP (ref 3.5–5.3)
PROT SERPL-MCNC: 7.5 GM/DL — SIGNIFICANT CHANGE UP (ref 6–8.3)
SODIUM SERPL-SCNC: 134 MMOL/L — LOW (ref 135–145)
SPECIMEN SOURCE: SIGNIFICANT CHANGE UP
SPECIMEN SOURCE: SIGNIFICANT CHANGE UP

## 2021-05-30 PROCEDURE — 99233 SBSQ HOSP IP/OBS HIGH 50: CPT

## 2021-05-30 RX ORDER — LACTULOSE 10 G/15ML
10 SOLUTION ORAL ONCE
Refills: 0 | Status: COMPLETED | OUTPATIENT
Start: 2021-05-30 | End: 2021-05-30

## 2021-05-30 RX ADMIN — DEXTROSE MONOHYDRATE, SODIUM CHLORIDE, AND POTASSIUM CHLORIDE 125 MILLILITER(S): 50; .745; 4.5 INJECTION, SOLUTION INTRAVENOUS at 20:33

## 2021-05-30 RX ADMIN — PANTOPRAZOLE SODIUM 40 MILLIGRAM(S): 20 TABLET, DELAYED RELEASE ORAL at 12:35

## 2021-05-30 RX ADMIN — DEXTROSE MONOHYDRATE, SODIUM CHLORIDE, AND POTASSIUM CHLORIDE 125 MILLILITER(S): 50; .745; 4.5 INJECTION, SOLUTION INTRAVENOUS at 10:19

## 2021-05-30 RX ADMIN — Medication 1 GRAM(S): at 12:35

## 2021-05-30 RX ADMIN — Medication 1 GRAM(S): at 18:00

## 2021-05-30 RX ADMIN — SENNA PLUS 2 TABLET(S): 8.6 TABLET ORAL at 21:53

## 2021-05-30 RX ADMIN — LACTULOSE 10 GRAM(S): 10 SOLUTION ORAL at 18:44

## 2021-05-30 RX ADMIN — Medication 1 GRAM(S): at 05:10

## 2021-05-30 RX ADMIN — ENOXAPARIN SODIUM 40 MILLIGRAM(S): 100 INJECTION SUBCUTANEOUS at 12:35

## 2021-05-30 RX ADMIN — DEXTROSE MONOHYDRATE, SODIUM CHLORIDE, AND POTASSIUM CHLORIDE 125 MILLILITER(S): 50; .745; 4.5 INJECTION, SOLUTION INTRAVENOUS at 02:09

## 2021-05-30 NOTE — CHART NOTE - NSCHARTNOTEFT_GEN_A_CORE
Assessment:    Pt adm w/N/V, per chart pt w/intractable N/V and recent dx of Gastroparesis (05/07/21- s/p gastric emptying study). Met w/pt at bedside, pt s/p several episodes of emesis overnight, feeling nauseous and tired at time of visit. S/P EGD w/bx (5/27), receiving IVF and IV Zofran. NPO/Clear Fluids x 9 days (since 5/21).    Factors impacting intake: [ ] none [x ] nausea  [x ] vomiting [ ] diarrhea [ ] constipation  [ ]chewing problems [ ] swallowing issues  [ ] other:     Diet Prescription: Diet, Clear Liquid (05-27-21 @ 18:35)    Intake: poor on Clear fluids.    Current Weight: (5/29) 111.6 kg, (5/25) 100.8 kg and 111 kg, (5/21 adm) 120.1 kg  % Weight Change: unable to accurately assess weight change due to big discrepancies noted in daily weights, as above.    Physical appearance: no edema documented.  Unable to conduct nutrition-focused physical exam due to pt c/o ongoing nausea and feeling tired. Pt declined NFPE.     Pertinent Medications: MEDICATIONS  (STANDING):  dextrose 5% + sodium chloride 0.45% with potassium chloride 20 mEq/L 1000 milliLiter(s) (125 mL/Hr) IV Continuous <Continuous>  enoxaparin Injectable 40 milliGRAM(s) SubCutaneous daily  pantoprazole  Injectable 40 milliGRAM(s) IV Push daily  senna 2 Tablet(s) Oral at bedtime  sucralfate 1 Gram(s) Oral four times a day    MEDICATIONS  (PRN):  acetaminophen   Tablet .. 650 milliGRAM(s) Oral every 6 hours PRN Temp greater or equal to 38C (100.4F), Mild Pain (1 - 3)  bisacodyl 5 milliGRAM(s) Oral every 12 hours PRN Constipation  diphenhydrAMINE 25 milliGRAM(s) Oral every 6 hours PRN Rash and/or Itching  ondansetron Injectable 4 milliGRAM(s) IV Push once PRN Nausea and/or Vomiting  saline laxative (FLEET) Rectal Enema 1 Enema Rectal daily PRN constipation    Pertinent Labs: 05-30 Na134 mmol/L<L> Glu 112 mg/dL<H> K+ 3.9 mmol/L Cr  0.68 mg/dL BUN 4 mg/dL<L> 05-30 Phos 2.6 mg/dL 05-30 Alb 3.4 g/dL    05-12-21 @ 10:21A1C 5.4    Skin: no pressure ulcers.    Estimated Needs:   [x ] no change since previous assessment (5/25)  [ ] recalculated:     Previous Nutrition Diagnosis:   Malnutrition Severe malnutrition in the context of acute illness.     Etiology inadequate energy, protein intake in the presence of gastroparesis, intractable N/V.     Signs/Symptoms >5% weight loss in 2 weeks and <50% nutritional intake x >5 days.     Goal/Expected Outcome Pt will consume >/=75% nutritional needs via tolerated route (GOAL not met yet).    Nutrition Diagnosis is [x ] ongoing  [ ] resolved [ ] not applicable     New Nutrition Diagnosis: [x ] not applicable      Interventions:   Recommend  [x ] Change Diet To: If/when medically gradually advance diet, as tolerated, to Full Liquids, Lactose-free---> Fiber/Residue restricted, Low fat, Lactose-free  [ ] Nutrition Supplement  [x ] Nutrition Support: If PO diet is not tolerated/unable to be advanced, consider alternate nutritional support via PPN/TPN, as medically feasible.  [ ] Other:     Monitoring and Evaluation:   [x ] PO intake [ x ] Tolerance to diet prescription [ x ] weights [ x ] labs[ x ] follow up per protocol  [ ] other:

## 2021-05-30 NOTE — PROGRESS NOTE ADULT - ASSESSMENT
26 y/o male with likely gastroparesis and hyperemesis from THC abuse s/p egd. will check ammonia and give lactulose
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
## 2021-05-31 LAB
ALBUMIN SERPL ELPH-MCNC: 3.3 G/DL — SIGNIFICANT CHANGE UP (ref 3.3–5)
ALP SERPL-CCNC: 77 U/L — SIGNIFICANT CHANGE UP (ref 40–120)
ALT FLD-CCNC: 450 U/L — HIGH (ref 12–78)
AMMONIA BLD-MCNC: 39 UMOL/L — HIGH (ref 11–32)
ANION GAP SERPL CALC-SCNC: 9 MMOL/L — SIGNIFICANT CHANGE UP (ref 5–17)
AST SERPL-CCNC: 124 U/L — HIGH (ref 15–37)
BILIRUB DIRECT SERPL-MCNC: 0.32 MG/DL — HIGH (ref 0.05–0.2)
BILIRUB INDIRECT FLD-MCNC: 0.6 MG/DL — SIGNIFICANT CHANGE UP (ref 0.2–1)
BILIRUB SERPL-MCNC: 0.9 MG/DL — SIGNIFICANT CHANGE UP (ref 0.2–1.2)
BUN SERPL-MCNC: 4 MG/DL — LOW (ref 7–23)
CALCIUM SERPL-MCNC: 9.6 MG/DL — SIGNIFICANT CHANGE UP (ref 8.5–10.1)
CHLORIDE SERPL-SCNC: 98 MMOL/L — SIGNIFICANT CHANGE UP (ref 96–108)
CO2 SERPL-SCNC: 26 MMOL/L — SIGNIFICANT CHANGE UP (ref 22–31)
CREAT SERPL-MCNC: 0.73 MG/DL — SIGNIFICANT CHANGE UP (ref 0.5–1.3)
CRP SERPL-MCNC: 24 MG/L — HIGH
GLUCOSE SERPL-MCNC: 106 MG/DL — HIGH (ref 70–99)
HCT VFR BLD CALC: 38.9 % — LOW (ref 39–50)
HGB BLD-MCNC: 13.7 G/DL — SIGNIFICANT CHANGE UP (ref 13–17)
MAGNESIUM SERPL-MCNC: 2.2 MG/DL — SIGNIFICANT CHANGE UP (ref 1.6–2.6)
MCHC RBC-ENTMCNC: 28.7 PG — SIGNIFICANT CHANGE UP (ref 27–34)
MCHC RBC-ENTMCNC: 35.2 GM/DL — SIGNIFICANT CHANGE UP (ref 32–36)
MCV RBC AUTO: 81.4 FL — SIGNIFICANT CHANGE UP (ref 80–100)
NRBC # BLD: 0 /100 WBCS — SIGNIFICANT CHANGE UP (ref 0–0)
PHOSPHATE SERPL-MCNC: 2.8 MG/DL — SIGNIFICANT CHANGE UP (ref 2.5–4.5)
PLATELET # BLD AUTO: 383 K/UL — SIGNIFICANT CHANGE UP (ref 150–400)
POTASSIUM SERPL-MCNC: 4.1 MMOL/L — SIGNIFICANT CHANGE UP (ref 3.5–5.3)
POTASSIUM SERPL-SCNC: 4.1 MMOL/L — SIGNIFICANT CHANGE UP (ref 3.5–5.3)
PROT SERPL-MCNC: 7.9 GM/DL — SIGNIFICANT CHANGE UP (ref 6–8.3)
RBC # BLD: 4.78 M/UL — SIGNIFICANT CHANGE UP (ref 4.2–5.8)
RBC # FLD: 12.8 % — SIGNIFICANT CHANGE UP (ref 10.3–14.5)
SODIUM SERPL-SCNC: 133 MMOL/L — LOW (ref 135–145)
WBC # BLD: 7.23 K/UL — SIGNIFICANT CHANGE UP (ref 3.8–10.5)
WBC # FLD AUTO: 7.23 K/UL — SIGNIFICANT CHANGE UP (ref 3.8–10.5)

## 2021-05-31 PROCEDURE — 99233 SBSQ HOSP IP/OBS HIGH 50: CPT

## 2021-05-31 RX ORDER — AMLODIPINE BESYLATE 2.5 MG/1
5 TABLET ORAL DAILY
Refills: 0 | Status: DISCONTINUED | OUTPATIENT
Start: 2021-05-31 | End: 2021-06-03

## 2021-05-31 RX ADMIN — PANTOPRAZOLE SODIUM 40 MILLIGRAM(S): 20 TABLET, DELAYED RELEASE ORAL at 12:31

## 2021-05-31 RX ADMIN — Medication 1 GRAM(S): at 23:34

## 2021-05-31 RX ADMIN — AMLODIPINE BESYLATE 5 MILLIGRAM(S): 2.5 TABLET ORAL at 17:42

## 2021-05-31 RX ADMIN — Medication 1 GRAM(S): at 17:42

## 2021-05-31 RX ADMIN — DEXTROSE MONOHYDRATE, SODIUM CHLORIDE, AND POTASSIUM CHLORIDE 125 MILLILITER(S): 50; .745; 4.5 INJECTION, SOLUTION INTRAVENOUS at 05:10

## 2021-05-31 RX ADMIN — ENOXAPARIN SODIUM 40 MILLIGRAM(S): 100 INJECTION SUBCUTANEOUS at 12:30

## 2021-05-31 RX ADMIN — DEXTROSE MONOHYDRATE, SODIUM CHLORIDE, AND POTASSIUM CHLORIDE 125 MILLILITER(S): 50; .745; 4.5 INJECTION, SOLUTION INTRAVENOUS at 12:32

## 2021-05-31 RX ADMIN — Medication 1 GRAM(S): at 00:15

## 2021-05-31 RX ADMIN — Medication 1 GRAM(S): at 05:10

## 2021-05-31 RX ADMIN — Medication 1 GRAM(S): at 12:31

## 2021-05-31 RX ADMIN — SENNA PLUS 2 TABLET(S): 8.6 TABLET ORAL at 21:35
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
## 2021-05-31 NOTE — PHYSICAL THERAPY INITIAL EVALUATION ADULT - STANDING BALANCE: STATIC
Detail Level: Simple Text: The above diagnosis and findings were noted on the exam but not discussed at this time with the patient. fair balance

## 2021-05-31 NOTE — PHYSICAL THERAPY INITIAL EVALUATION ADULT - GAIT TRAINING, PT EVAL
Independent in ambulation with use of without device up to 200 feet observing proper gait pattern, posture and use of walking device safely.
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
## 2021-05-31 NOTE — PHYSICAL THERAPY INITIAL EVALUATION ADULT - ADDITIONAL COMMENTS
Pt lives with family in a house with 3 steps B railing to enter/5 steps B railing to 2nd floor. Pt was independent with ambulation/ADLS without AD.

## 2021-05-31 NOTE — PROGRESS NOTE ADULT - ASSESSMENT
24 y/o male with likely gastroparesis and hyperemesis from THC abuse s/p egd. increasing liverfunction tests but improving N/V and able to tolerate food    Attempted to contact Hepatology  to consult but was un successful.     Please call hepatology at [facility] in AM. Case was disccused with IR for possible liver Biopsy
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
## 2021-05-31 NOTE — PHYSICAL THERAPY INITIAL EVALUATION ADULT - GAIT DEVIATIONS NOTED, PT EVAL
decreased weight-shifting ability decreased sy/decreased step length/decreased stride length/decreased weight-shifting ability

## 2021-06-01 LAB
ALBUMIN SERPL ELPH-MCNC: 3.6 G/DL — SIGNIFICANT CHANGE UP (ref 3.3–5)
ALP SERPL-CCNC: 89 U/L — SIGNIFICANT CHANGE UP (ref 40–120)
ALT FLD-CCNC: 525 U/L — HIGH (ref 12–78)
ANION GAP SERPL CALC-SCNC: 11 MMOL/L — SIGNIFICANT CHANGE UP (ref 5–17)
AST SERPL-CCNC: 135 U/L — HIGH (ref 15–37)
BILIRUB DIRECT SERPL-MCNC: 0.48 MG/DL — HIGH (ref 0.05–0.2)
BILIRUB INDIRECT FLD-MCNC: 0.6 MG/DL — SIGNIFICANT CHANGE UP (ref 0.2–1)
BILIRUB SERPL-MCNC: 1.1 MG/DL — SIGNIFICANT CHANGE UP (ref 0.2–1.2)
BUN SERPL-MCNC: 5 MG/DL — LOW (ref 7–23)
CALCIUM SERPL-MCNC: 9.8 MG/DL — SIGNIFICANT CHANGE UP (ref 8.5–10.1)
CHLORIDE SERPL-SCNC: 95 MMOL/L — LOW (ref 96–108)
CO2 SERPL-SCNC: 25 MMOL/L — SIGNIFICANT CHANGE UP (ref 22–31)
CREAT SERPL-MCNC: 0.82 MG/DL — SIGNIFICANT CHANGE UP (ref 0.5–1.3)
CRP SERPL-MCNC: 40 MG/L — HIGH
FLUAV AG NPH QL: SIGNIFICANT CHANGE UP
FLUBV AG NPH QL: SIGNIFICANT CHANGE UP
GLUCOSE SERPL-MCNC: 96 MG/DL — SIGNIFICANT CHANGE UP (ref 70–99)
HCT VFR BLD CALC: 43.4 % — SIGNIFICANT CHANGE UP (ref 39–50)
HGB BLD-MCNC: 14.8 G/DL — SIGNIFICANT CHANGE UP (ref 13–17)
HIV 1+2 AB+HIV1 P24 AG SERPL QL IA: SIGNIFICANT CHANGE UP
INR BLD: 1.46 RATIO — HIGH (ref 0.88–1.16)
MAGNESIUM SERPL-MCNC: 2.1 MG/DL — SIGNIFICANT CHANGE UP (ref 1.6–2.6)
MCHC RBC-ENTMCNC: 28.2 PG — SIGNIFICANT CHANGE UP (ref 27–34)
MCHC RBC-ENTMCNC: 34.1 GM/DL — SIGNIFICANT CHANGE UP (ref 32–36)
MCV RBC AUTO: 82.7 FL — SIGNIFICANT CHANGE UP (ref 80–100)
MITOCHONDRIA AB SER-ACNC: SIGNIFICANT CHANGE UP
NRBC # BLD: 0 /100 WBCS — SIGNIFICANT CHANGE UP (ref 0–0)
PHOSPHATE SERPL-MCNC: 2.4 MG/DL — LOW (ref 2.5–4.5)
PLATELET # BLD AUTO: 428 K/UL — HIGH (ref 150–400)
POTASSIUM SERPL-MCNC: 3.7 MMOL/L — SIGNIFICANT CHANGE UP (ref 3.5–5.3)
POTASSIUM SERPL-SCNC: 3.7 MMOL/L — SIGNIFICANT CHANGE UP (ref 3.5–5.3)
PROT SERPL-MCNC: 8.5 GM/DL — HIGH (ref 6–8.3)
PROTHROM AB SERPL-ACNC: 16.6 SEC — HIGH (ref 10.6–13.6)
RBC # BLD: 5.25 M/UL — SIGNIFICANT CHANGE UP (ref 4.2–5.8)
RBC # FLD: 12.7 % — SIGNIFICANT CHANGE UP (ref 10.3–14.5)
SARS-COV-2 RNA SPEC QL NAA+PROBE: SIGNIFICANT CHANGE UP
SMOOTH MUSCLE AB SER-ACNC: SIGNIFICANT CHANGE UP
SODIUM SERPL-SCNC: 131 MMOL/L — LOW (ref 135–145)
SURGICAL PATHOLOGY STUDY: SIGNIFICANT CHANGE UP
WBC # BLD: 7.93 K/UL — SIGNIFICANT CHANGE UP (ref 3.8–10.5)
WBC # FLD AUTO: 7.93 K/UL — SIGNIFICANT CHANGE UP (ref 3.8–10.5)

## 2021-06-01 PROCEDURE — 99233 SBSQ HOSP IP/OBS HIGH 50: CPT

## 2021-06-01 PROCEDURE — 70450 CT HEAD/BRAIN W/O DYE: CPT | Mod: 26

## 2021-06-01 RX ORDER — OXYCODONE HYDROCHLORIDE 5 MG/1
5 TABLET ORAL EVERY 4 HOURS
Refills: 0 | Status: DISCONTINUED | OUTPATIENT
Start: 2021-06-01 | End: 2021-06-03

## 2021-06-01 RX ORDER — PHYTONADIONE (VIT K1) 5 MG
5 TABLET ORAL ONCE
Refills: 0 | Status: COMPLETED | OUTPATIENT
Start: 2021-06-01 | End: 2021-06-01

## 2021-06-01 RX ADMIN — PANTOPRAZOLE SODIUM 40 MILLIGRAM(S): 20 TABLET, DELAYED RELEASE ORAL at 11:11

## 2021-06-01 RX ADMIN — AMLODIPINE BESYLATE 5 MILLIGRAM(S): 2.5 TABLET ORAL at 05:56

## 2021-06-01 RX ADMIN — OXYCODONE HYDROCHLORIDE 5 MILLIGRAM(S): 5 TABLET ORAL at 17:17

## 2021-06-01 RX ADMIN — Medication 1 GRAM(S): at 05:56

## 2021-06-01 RX ADMIN — Medication 5 MILLIGRAM(S): at 18:27

## 2021-06-01 RX ADMIN — Medication 1 GRAM(S): at 23:08

## 2021-06-01 RX ADMIN — ENOXAPARIN SODIUM 40 MILLIGRAM(S): 100 INJECTION SUBCUTANEOUS at 11:12

## 2021-06-01 RX ADMIN — Medication 1 GRAM(S): at 17:17

## 2021-06-01 RX ADMIN — Medication 1 GRAM(S): at 11:11

## 2021-06-01 NOTE — PROGRESS NOTE ADULT - PROBLEM SELECTOR PLAN 2
Negative HIDA   still under investigation will check for ebv   send hep serologies
Secondary to intractable vomiting.     Hypokalemia: replaced   Hypercalcemia: resolved w/ IV hydration
Negative HIDA   still under investigation will check for ebv   send hep serologies
Secondary to intractable vomiting.     Hypokalemia: replaced   Hypercalcemia: resolved w/ IV hydration
Negative HIDA   still under investigation   Hep serologies negative   EBV with evidence of prior infection
Secondary to intractable vomiting.     Hypokalemia: replaced   Hypercalcemia: resolved w/ IV hydration
Secondary to intractable vomiting.     Hypokalemia: replaced   Hypercalcemia: resolved w/ IV hydration
Negative HIDA   still under investigation   Hep serologies negative   EBV with evidence of prior infection
Negative HIDA   still under investigation will check for ebv   send hep serologies

## 2021-06-01 NOTE — PROGRESS NOTE ADULT - PROBLEM SELECTOR PLAN 3
Secondary to intractable vomiting.     Hypokalemia: replaced   Hypercalcemia: resolved w/ IV hydration    continue to monitor
Secondary to intractable vomiting.     Hypokalemia: replaced   Hypercalcemia: resolved w/ IV hydration
No chest pain today  EKG: NSR @71bpm, TWI leads I, II, III, AVF, V4-V6  Troponin neg X2   no significant events on Telemonitor,   f/u Cardiology consult echo reviewed stress test as out patient
No chest pain today  EKG: NSR @71bpm, TWI leads I, II, III, AVF, V4-V6  Troponin neg X2   no significant events on Telemonitor,   f/u Cardiology consultrecomend echo
Secondary to intractable vomiting.     Hypokalemia: replaced   Hypercalcemia: resolved w/ IV hydration
"tightness"  EKG: NSR @71bpm, TWI leads I, II, III, AVF, V4-V6  Troponin neg X1   will place on Telemonitor, f/u Cardiology consult, cont to trend Troponin
DVTp: low risk, early ambulation     Estimated time for admission 40 minutes.
DVTp: low risk, early ambulation     Estimated time for admission 40 minutes.
No chest pain today  EKG: NSR @71bpm, TWI leads I, II, III, AVF, V4-V6  Troponin neg X2   no significant events on Telemonitor,   f/u Cardiology consult
Secondary to intractable vomiting.     Hypokalemia: replaced   Hypercalcemia: resolved w/ IV hydration
Secondary to intractable vomiting.     Hypokalemia: replaced   Hypercalcemia: resolved w/ IV hydration    continue to monitor

## 2021-06-01 NOTE — PROGRESS NOTE ADULT - PROBLEM SELECTOR PROBLEM 1
Intractable vomiting with nausea

## 2021-06-01 NOTE — PROGRESS NOTE ADULT - PROBLEM SELECTOR PROBLEM 3
Chest pain, unspecified type
Electrolyte abnormality
Chest pain, unspecified type
Preventive measure
Electrolyte abnormality
Preventive measure
Electrolyte abnormality
Chest pain, unspecified type
Electrolyte abnormality
Electrolyte abnormality
Chest pain, unspecified type
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
## 2021-06-01 NOTE — PROGRESS NOTE ADULT - PROBLEM SELECTOR PROBLEM 4
Bladder wall thickening
Chest pain, unspecified type
Bladder wall thickening
Bladder wall thickening
Chest pain, unspecified type
Bladder wall thickening
Chest pain, unspecified type

## 2021-06-01 NOTE — PROGRESS NOTE ADULT - PROBLEM SELECTOR PROBLEM 2
Electrolyte abnormality
Liver enzyme elevation
Liver enzyme elevation
Electrolyte abnormality
Electrolyte abnormality
Liver enzyme elevation
Electrolyte abnormality
Liver enzyme elevation
Liver enzyme elevation

## 2021-06-01 NOTE — PROGRESS NOTE ADULT - ASSESSMENT
26 y/o male with likely gastroparesis and hyperemesis from THC abuse s/p egd. increasing liverfunction tests but improving N/V and able to tolerate food    Attempted to contact Hepatology  to consult but was un successful.     Please call hepatology at [facility] in AM. Case was disccused with IR for possible liver Biopsy
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
## 2021-06-01 NOTE — PROGRESS NOTE ADULT - PROBLEM SELECTOR PLAN 1
Presumed Gastroparesis  secondary to THC overuse urine remains positive for thc  HIDA scan normal   GI consult appreciated   KUB benign   CT abd: No bowel obstruction. No gastric distention. Normal appendix.  off IVF , IV Zofran, analgesics prn
Presumed Gastroparesis  secondary to THC overuse urine remains possitive for thc  HIDA scan tomorrow   GI consult appreciated   KUB benign   CT abd: No bowel obstruction. No gastric distention. Normal appendix.  cont w/ IVF, IV Zofran, analgesics prn
Presumed Gastroparesis   GI consult appreciated   KUB benign   CT abd: No bowel obstruction. No gastric distention. Normal appendix.  cont w/ IVF, IV Zofran, analgesics prn  will give trail of CLD today and monitor
Presumed Gastroparesis   GI consult appreciated   KUB benign   cont w/ IVF, IV reglan, analgesics prn  will start CLD and advance as tolerated
Presumed Gastroparesis   GI consult appreciated   KUB benign   cont w/ IVF, IV reglan, analgesics prn  NPO status for now
Presumed Gastroparesis  secondary to THC overuse urine remains positive for thc  HIDA scan normal   GI consult appreciated   KUB benign   CT abd: No bowel obstruction. No gastric distention. Normal appendix.  cont w/ IVF, IV Zofran, analgesics prn
Presumed Gastroparesis  secondary to THC overuse urine remains positive for thc  HIDA scan normal   GI consult appreciated   KUB benign   CT abd: No bowel obstruction. No gastric distention. Normal appendix.  cont w/ IVF, IV Zofran, analgesics prn
Presumed Gastroparesis   GI consult appreciated   KUB benign   CT abd: No bowel obstruction. No gastric distention. Normal appendix.  cont w/ IVF, IV Zofran, analgesics prn
Presumed Gastroparesis   GI consult appreciated   KUB benign   CT abd: No bowel obstruction. No gastric distention. Normal appendix.  cont w/ IVF, IV Zofran, analgesics prn  NPO status for now
Presumed Gastroparesis  secondary to THC overuse urine remains positive for thc  HIDA scan normal   GI consult appreciated   KUB benign   CT abd: No bowel obstruction. No gastric distention. Normal appendix.  cont w/ IVF, IV Zofran, analgesics prn
Presumed Gastroparesis  secondary to THC overuse urine remains positive for thc  HIDA scan normal   GI consult appreciated   KUB benign   CT abd: No bowel obstruction. No gastric distention. Normal appendix.  off IVF , IV Zofran, analgesics prn

## 2021-06-01 NOTE — PROGRESS NOTE ADULT - PROBLEM SELECTOR PLAN 4
No chest pain today  EKG: NSR @71bpm, TWI leads I, II, III, AVF, V4-V6  Troponin neg X2   no significant events on Tele   Cardiology consult appreciated  echo reviewed stress test as out patient
No chest pain today  EKG: NSR @71bpm, TWI leads I, II, III, AVF, V4-V6  Troponin neg X2   no significant events on Telemonitor,   f/u Cardiology consult echo reviewed stress test as out patient
CT abdomen: Bladder was not well-distended. Focal thickening in enhancement of the anterior superior bladder, recommend correlation with urinalysis and direct visualization.  UA: positive markers, Urine Cx: negative  Urology consult appreciated   Pt will most likely need outpatient followup
No chest pain today  EKG: NSR @71bpm, TWI leads I, II, III, AVF, V4-V6  Troponin neg X2   no significant events on Tele   Cardiology consult appreciated  echo reviewed stress test as out patient
No chest pain today  EKG: NSR @71bpm, TWI leads I, II, III, AVF, V4-V6  Troponin neg X2   no significant events on Telemonitor,   f/u Cardiology consult echo reviewed stress test as out patient
CT abdomen: Bladder was not well-distended. Focal thickening in enhancement of the anterior superior bladder, recommend correlation with urinalysis and direct visualization.  UA: positive markers, Urine Cx: negative  Urology consult appreciated   Pt will most likely need outpatient followup
No chest pain today  EKG: NSR @71bpm, TWI leads I, II, III, AVF, V4-V6  Troponin neg X2   no significant events on Telemonitor,   f/u Cardiology consult echo reviewed stress test as out patient

## 2021-06-01 NOTE — PROGRESS NOTE ADULT - PROBLEM SELECTOR PROBLEM 5
Preventive measure
Bladder wall thickening
Preventive measure
Bladder wall thickening
Preventive measure
Preventive measure
Bladder wall thickening

## 2021-06-02 LAB
ALBUMIN SERPL ELPH-MCNC: 3.6 G/DL — SIGNIFICANT CHANGE UP (ref 3.3–5)
ALP SERPL-CCNC: 98 U/L — SIGNIFICANT CHANGE UP (ref 40–120)
ALT FLD-CCNC: 604 U/L — HIGH (ref 12–78)
ANION GAP SERPL CALC-SCNC: 9 MMOL/L — SIGNIFICANT CHANGE UP (ref 5–17)
AST SERPL-CCNC: 128 U/L — HIGH (ref 15–37)
BILIRUB DIRECT SERPL-MCNC: 0.28 MG/DL — HIGH (ref 0.05–0.2)
BILIRUB INDIRECT FLD-MCNC: 0.5 MG/DL — SIGNIFICANT CHANGE UP (ref 0.2–1)
BILIRUB SERPL-MCNC: 0.8 MG/DL — SIGNIFICANT CHANGE UP (ref 0.2–1.2)
BUN SERPL-MCNC: 13 MG/DL — SIGNIFICANT CHANGE UP (ref 7–23)
CALCIUM SERPL-MCNC: 9.9 MG/DL — SIGNIFICANT CHANGE UP (ref 8.5–10.1)
CHLORIDE SERPL-SCNC: 102 MMOL/L — SIGNIFICANT CHANGE UP (ref 96–108)
CO2 SERPL-SCNC: 23 MMOL/L — SIGNIFICANT CHANGE UP (ref 22–31)
CREAT SERPL-MCNC: 1.03 MG/DL — SIGNIFICANT CHANGE UP (ref 0.5–1.3)
GLUCOSE SERPL-MCNC: 157 MG/DL — HIGH (ref 70–99)
HCT VFR BLD CALC: 47.4 % — SIGNIFICANT CHANGE UP (ref 39–50)
HGB BLD-MCNC: 16.5 G/DL — SIGNIFICANT CHANGE UP (ref 13–17)
INR BLD: 1.25 RATIO — HIGH (ref 0.88–1.16)
MCHC RBC-ENTMCNC: 28.3 PG — SIGNIFICANT CHANGE UP (ref 27–34)
MCHC RBC-ENTMCNC: 34.8 GM/DL — SIGNIFICANT CHANGE UP (ref 32–36)
MCV RBC AUTO: 81.3 FL — SIGNIFICANT CHANGE UP (ref 80–100)
NRBC # BLD: 0 /100 WBCS — SIGNIFICANT CHANGE UP (ref 0–0)
PLATELET # BLD AUTO: 514 K/UL — HIGH (ref 150–400)
POTASSIUM SERPL-MCNC: 3.4 MMOL/L — LOW (ref 3.5–5.3)
POTASSIUM SERPL-SCNC: 3.4 MMOL/L — LOW (ref 3.5–5.3)
PROT SERPL-MCNC: 8.9 GM/DL — HIGH (ref 6–8.3)
PROTHROM AB SERPL-ACNC: 14.3 SEC — HIGH (ref 10.6–13.6)
RBC # BLD: 5.83 M/UL — HIGH (ref 4.2–5.8)
RBC # FLD: 13.1 % — SIGNIFICANT CHANGE UP (ref 10.3–14.5)
SODIUM SERPL-SCNC: 134 MMOL/L — LOW (ref 135–145)
WBC # BLD: 11.32 K/UL — HIGH (ref 3.8–10.5)
WBC # FLD AUTO: 11.32 K/UL — HIGH (ref 3.8–10.5)

## 2021-06-02 PROCEDURE — 99233 SBSQ HOSP IP/OBS HIGH 50: CPT

## 2021-06-02 RX ADMIN — SENNA PLUS 2 TABLET(S): 8.6 TABLET ORAL at 21:54

## 2021-06-02 RX ADMIN — Medication 1 GRAM(S): at 17:33

## 2021-06-02 RX ADMIN — Medication 1 GRAM(S): at 11:28

## 2021-06-02 RX ADMIN — PANTOPRAZOLE SODIUM 40 MILLIGRAM(S): 20 TABLET, DELAYED RELEASE ORAL at 11:28

## 2021-06-02 RX ADMIN — AMLODIPINE BESYLATE 5 MILLIGRAM(S): 2.5 TABLET ORAL at 05:55

## 2021-06-02 RX ADMIN — Medication 1 GRAM(S): at 05:55

## 2021-06-02 NOTE — CHART NOTE - NSCHARTNOTEFT_GEN_A_CORE
-IR liver biopsy aborted.  -Patient diaphoretic, , /95.  -Procedure postponed until more stable. D/w Dr. Grigsby
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
## 2021-06-03 LAB
ALBUMIN SERPL ELPH-MCNC: 3.3 G/DL — SIGNIFICANT CHANGE UP (ref 3.3–5)
ALP SERPL-CCNC: 101 U/L — SIGNIFICANT CHANGE UP (ref 40–120)
ALT FLD-CCNC: 625 U/L — HIGH (ref 12–78)
ANION GAP SERPL CALC-SCNC: 8 MMOL/L — SIGNIFICANT CHANGE UP (ref 5–17)
AST SERPL-CCNC: 143 U/L — HIGH (ref 15–37)
BILIRUB DIRECT SERPL-MCNC: 0.34 MG/DL — HIGH (ref 0.05–0.2)
BILIRUB SERPL-MCNC: 0.8 MG/DL — SIGNIFICANT CHANGE UP (ref 0.2–1.2)
BUN SERPL-MCNC: 10 MG/DL — SIGNIFICANT CHANGE UP (ref 7–23)
CALCIUM SERPL-MCNC: 9.4 MG/DL — SIGNIFICANT CHANGE UP (ref 8.5–10.1)
CHLORIDE SERPL-SCNC: 102 MMOL/L — SIGNIFICANT CHANGE UP (ref 96–108)
CO2 SERPL-SCNC: 26 MMOL/L — SIGNIFICANT CHANGE UP (ref 22–31)
CREAT SERPL-MCNC: 0.85 MG/DL — SIGNIFICANT CHANGE UP (ref 0.5–1.3)
GLUCOSE SERPL-MCNC: 109 MG/DL — HIGH (ref 70–99)
HCT VFR BLD CALC: 46.4 % — SIGNIFICANT CHANGE UP (ref 39–50)
HGB BLD-MCNC: 15.5 G/DL — SIGNIFICANT CHANGE UP (ref 13–17)
MCHC RBC-ENTMCNC: 28.1 PG — SIGNIFICANT CHANGE UP (ref 27–34)
MCHC RBC-ENTMCNC: 33.4 GM/DL — SIGNIFICANT CHANGE UP (ref 32–36)
MCV RBC AUTO: 84.2 FL — SIGNIFICANT CHANGE UP (ref 80–100)
NRBC # BLD: 0 /100 WBCS — SIGNIFICANT CHANGE UP (ref 0–0)
PLATELET # BLD AUTO: 496 K/UL — HIGH (ref 150–400)
POTASSIUM SERPL-MCNC: 3.7 MMOL/L — SIGNIFICANT CHANGE UP (ref 3.5–5.3)
POTASSIUM SERPL-SCNC: 3.7 MMOL/L — SIGNIFICANT CHANGE UP (ref 3.5–5.3)
PROT SERPL-MCNC: 8.1 GM/DL — SIGNIFICANT CHANGE UP (ref 6–8.3)
RBC # BLD: 5.51 M/UL — SIGNIFICANT CHANGE UP (ref 4.2–5.8)
RBC # FLD: 13.2 % — SIGNIFICANT CHANGE UP (ref 10.3–14.5)
SODIUM SERPL-SCNC: 136 MMOL/L — SIGNIFICANT CHANGE UP (ref 135–145)
WBC # BLD: 11.73 K/UL — HIGH (ref 3.8–10.5)
WBC # FLD AUTO: 11.73 K/UL — HIGH (ref 3.8–10.5)

## 2021-06-03 PROCEDURE — 99223 1ST HOSP IP/OBS HIGH 75: CPT | Mod: GC

## 2021-06-03 PROCEDURE — 74181 MRI ABDOMEN W/O CONTRAST: CPT | Mod: 26

## 2021-06-03 PROCEDURE — 93010 ELECTROCARDIOGRAM REPORT: CPT

## 2021-06-03 PROCEDURE — 99233 SBSQ HOSP IP/OBS HIGH 50: CPT

## 2021-06-03 RX ORDER — LACTULOSE 10 G/15ML
20 SOLUTION ORAL
Refills: 0 | Status: DISCONTINUED | OUTPATIENT
Start: 2021-06-03 | End: 2021-06-06

## 2021-06-03 RX ORDER — PANTOPRAZOLE SODIUM 20 MG/1
40 TABLET, DELAYED RELEASE ORAL
Refills: 0 | Status: DISCONTINUED | OUTPATIENT
Start: 2021-06-03 | End: 2021-06-04

## 2021-06-03 RX ORDER — LACTULOSE 10 G/15ML
20 SOLUTION ORAL ONCE
Refills: 0 | Status: COMPLETED | OUTPATIENT
Start: 2021-06-03 | End: 2021-06-03

## 2021-06-03 RX ORDER — POTASSIUM CHLORIDE 20 MEQ
20 PACKET (EA) ORAL ONCE
Refills: 0 | Status: COMPLETED | OUTPATIENT
Start: 2021-06-03 | End: 2021-06-03

## 2021-06-03 RX ADMIN — AMLODIPINE BESYLATE 5 MILLIGRAM(S): 2.5 TABLET ORAL at 05:27

## 2021-06-03 RX ADMIN — Medication 1 GRAM(S): at 23:01

## 2021-06-03 RX ADMIN — Medication 1 GRAM(S): at 12:33

## 2021-06-03 RX ADMIN — Medication 1 GRAM(S): at 00:00

## 2021-06-03 RX ADMIN — PANTOPRAZOLE SODIUM 40 MILLIGRAM(S): 20 TABLET, DELAYED RELEASE ORAL at 12:33

## 2021-06-03 RX ADMIN — Medication 20 MILLIEQUIVALENT(S): at 05:27

## 2021-06-03 RX ADMIN — LACTULOSE 20 GRAM(S): 10 SOLUTION ORAL at 20:36

## 2021-06-03 RX ADMIN — Medication 1 GRAM(S): at 17:55

## 2021-06-03 RX ADMIN — Medication 1 GRAM(S): at 05:27

## 2021-06-03 RX ADMIN — SENNA PLUS 2 TABLET(S): 8.6 TABLET ORAL at 21:06

## 2021-06-03 NOTE — CONSULT NOTE ADULT - ATTENDING COMMENTS
I saw the patient without the fellow. Plan as above
I spoke to patient at length and he reports some hesitancy of initiation, but then a steady stream. He denies prior GH ever, but states he has done a 24 hr urine in the past due to proteinuria. He does not recall the dx or issue per se. We discussed the radiologic findings and fact that bladder wasn't very distended so hard to delineate. Discussed relationship between bowels and bladder. He has never required a catheter and was never told he didn't empty, but he feels he doesn't. This may or may not be related to his bowel distention and slow transit time etc.    Will need an out patient cystoscopy and cytology.  Urged to quit smoking pot or anything, including vape etc.

## 2021-06-03 NOTE — CONSULT NOTE ADULT - SUBJECTIVE AND OBJECTIVE BOX
Chief Complaint:  Patient is a 25y old  Male who presents with a chief complaint of Nausea/vomiting (2021 03:40)      HPI:    Allergies:  No Known Allergies      Home Medications:    Hospital Medications:  amLODIPine   Tablet 5 milliGRAM(s) Oral daily  bisacodyl 5 milliGRAM(s) Oral every 12 hours PRN  diphenhydrAMINE 25 milliGRAM(s) Oral every 6 hours PRN  ondansetron Injectable 4 milliGRAM(s) IV Push once PRN  oxyCODONE    IR 5 milliGRAM(s) Oral every 4 hours PRN  pantoprazole  Injectable 40 milliGRAM(s) IV Push daily  saline laxative (FLEET) Rectal Enema 1 Enema Rectal daily PRN  senna 2 Tablet(s) Oral at bedtime  sucralfate 1 Gram(s) Oral four times a day      PMHX/PSHX:  No pertinent past medical history    Obesity    Gastroparesis    No significant past surgical history    Status post fracture of left tibia    No significant past surgical history        Family history:  FH: diabetes mellitus    No pertinent family history in first degree relatives        Denies family history of colon cancer/polyps, stomach cancer/polyps, pancreatic cancer/masses, liver cancer/disease, ovarian cancer and endometrial cancer.    Social History:     Tob: Denies  EtOH: As above  Illicit Drugs: Denies    ROS:   General:  No wt loss, fevers, chills, night sweats, fatigue  Eyes:  Good vision, no reported pain  ENT:  No sore throat, pain, runny nose, dysphagia  CV:  No pain, palpitations, hypo/hypertension  Pulm:  No dyspnea, cough, tachypnea, wheezing  GI:  As per HPI  :  No pain, bleeding, incontinence, nocturia  Muscle:  No pain, weakness  Neuro:  No weakness, tingling, memory problems  Psych:  No fatigue, insomnia, mood problems, depression  Endocrine:  No polyuria, polydipsia, cold/heat intolerance  Heme:  No petechiae, ecchymosis, easy bruisability  Skin:  No rash, tattoos, scars, edema    PHYSICAL EXAM:   GENERAL:  No acute distress  HEENT:  Normocephalic/atraumatic, no scleral icterus  CHEST:  No accessory muscle use  HEART:  Regular rate and rhythm  ABDOMEN:  Soft, non-tender, non-distended, normoactive bowel sounds,  no masses, no hepato-splenomegaly, no signs of chronic liver disease  EXTREMITIES: No cyanosis, clubbing, or edema  SKIN:  No rash  NEURO:  Alert and oriented x 3, no asterixis    Vital Signs:  Vital Signs Last 24 Hrs  T(C): 36.4 (2021 11:11), Max: 37.1 (2021 23:26)  T(F): 97.5 (2021 11:11), Max: 98.7 (2021 23:26)  HR: 122 (2021 11:11) (112 - 142)  BP: 109/79 (2021 11:11) (109/79 - 180/103)  BP(mean): --  RR: 16 (2021 11:11) (16 - 18)  SpO2: 94% (2021 11:11) (94% - 99%)  Daily     Daily Weight in k.4 (2021 05:21)    LABS:                        15.5   11.73 )-----------( 496      ( 2021 07:43 )             46.4     Mean Cell Volume: 84.2 fl (21 @ 07:43)    -    136  |  102  |  10  ----------------------------<  109<H>  3.7   |  26  |  0.85    Ca    9.4      2021 07:43    TPro  8.1  /  Alb  3.3  /  TBili  0.8  /  DBili  .34<H>  /  AST  143<H>  /  ALT  625<H>  /  AlkPhos  101  06-03    LIVER FUNCTIONS - ( 2021 07:43 )  Alb: 3.3 g/dL / Pro: 8.1 gm/dL / ALK PHOS: 101 U/L / ALT: 625 U/L / AST: 143 U/L / GGT: x           PT/INR - ( 2021 07:05 )   PT: 14.3 sec;   INR: 1.25 ratio                                     15.5   11.73 )-----------( 496      ( 2021 07:43 )             46.4                         16.5   11.32 )-----------( 514      ( 2021 07:05 )             47.4                         14.8   7.93  )-----------( 428      ( 2021 06:40 )             43.4       Imaging:           Chief Complaint:  Patient is a 25y old  Male who presents with a chief complaint of Nausea/vomiting (2021 03:40)      HPI: 25 year old who I was asked to see for abnormal transaminases. He was admitted on 2021 with nausea and vomiting which have resolved. He was noted on admission to have an abnormal ALT of 92. During the course of his admission to date, the ALT has increased steadily with the Cassie 3, 2021 being 625. The AST has also risen from a baseline of 73 to 143 while the alkaline phosphatase and bilirubin have remained normal.  CT scan May 22, 2021 with hepatic steatosis. Evaluation to date with negative hepatitis A IgM, negative HBVsurface antigen and antibody and negative HCV AB. PATRICIA, AMA and ASMA are negative. EGD without stigmata of portal HTN and HIDA negative.    He currently is without abdominal pain. No pruritus.    He reports a history of abnormal liver tests for several years and he states that he lost more than 100 lbs over the past 2 years and his liver tests improved. Prior to admission, a May 6, 2021 ALT/AST was 69/32         Allergies:  No Known Allergies      Home Medications:    Hospital Medications:  amLODIPine   Tablet 5 milliGRAM(s) Oral daily  bisacodyl 5 milliGRAM(s) Oral every 12 hours PRN  diphenhydrAMINE 25 milliGRAM(s) Oral every 6 hours PRN  ondansetron Injectable 4 milliGRAM(s) IV Push once PRN  oxyCODONE    IR 5 milliGRAM(s) Oral every 4 hours PRN  pantoprazole  Injectable 40 milliGRAM(s) IV Push daily  saline laxative (FLEET) Rectal Enema 1 Enema Rectal daily PRN  senna 2 Tablet(s) Oral at bedtime  sucralfate 1 Gram(s) Oral four times a day      PMHX/PSHX:  No pertinent past medical history    Obesity    Gastroparesis    No significant past surgical history    Status post fracture of left tibia    No significant past surgical history        Family history:  FH: diabetes mellitus    No pertinent family history in first degree relatives        Denies family history of colon cancer/polyps, stomach cancer/polyps, pancreatic cancer/masses, liver cancer/disease, ovarian cancer and endometrial cancer.    Social History:     Tob: Denies  EtOH: As above  He reports frequent marijuana use  Illicit Drugs: Denies currently but used in the past.     ROS:   General:  No wt loss, fevers, chills, night sweats, fatigue  Eyes:  Good vision, no reported pain  ENT:  No sore throat, pain, runny nose, dysphagia  CV:  No pain, palpitations, hypo/hypertension  Pulm:  No dyspnea, cough, tachypnea, wheezing  GI:  As per HPI  :  No pain, bleeding, incontinence, nocturia  Muscle:  No pain, weakness  Neuro:  No weakness, tingling, memory problems  Psych:  No fatigue, insomnia, mood problems, depression  Endocrine:  No polyuria, polydipsia, cold/heat intolerance  Heme:  No petechiae, ecchymosis, easy bruisability  Skin:  No rash, tattoos, scars, edema    PHYSICAL EXAM:   GENERAL:  No acute distress  HEENT:  Normocephalic/atraumatic, no scleral icterus  CHEST:  No accessory muscle use  HEART:  Regular rate and rhythm  ABDOMEN:  Soft, non-tender, non-distended, normoactive bowel sounds,  no masses, no hepato-splenomegaly, no signs of chronic liver disease  EXTREMITIES: No cyanosis, clubbing, or edema  SKIN:  No rash  NEURO:  Alert and oriented x 3, no asterixis    Vital Signs:  Vital Signs Last 24 Hrs  T(C): 36.4 (2021 11:11), Max: 37.1 (2021 23:26)  T(F): 97.5 (2021 11:11), Max: 98.7 (2021 23:26)  HR: 122 (2021 11:11) (112 - 142)  BP: 109/79 (2021 11:11) (109/79 - 180/103)  BP(mean): --  RR: 16 (2021 11:11) (16 - 18)  SpO2: 94% (2021 11:11) (94% - 99%)  Daily     Daily Weight in k.4 (2021 05:21)    HEENT; non-icteric  Neck: supple, no nodes  Lungs: clear to A and P  Heart: normal, S1S2 RRR, no murmurs  Abd: soft, NT, no rebound, no guarding. Bowel sounds normal, liver edge not palpable, spleen tip not palpable  Ext: no clubbing, cyanosis or edema  Neuro: grossly normal  Skin: multiple tattoos       LABS:                        15.5   11.73 )-----------( 496      ( 2021 07:43 )             46.4     Mean Cell Volume: 84.2 fl (21 @ 07:43)        136  |  102  |  10  ----------------------------<  109<H>  3.7   |  26  |  0.85    Ca    9.4      2021 07:43    TPro  8.1  /  Alb  3.3  /  TBili  0.8  /  DBili  .34<H>  /  AST  143<H>  /  ALT  625<H>  /  AlkPhos  101  06-03    LIVER FUNCTIONS - ( 2021 07:43 )  Alb: 3.3 g/dL / Pro: 8.1 gm/dL / ALK PHOS: 101 U/L / ALT: 625 U/L / AST: 143 U/L / GGT: x           PT/INR - ( 2021 07:05 )   PT: 14.3 sec;   INR: 1.25 ratio                                     15.5   11.73 )-----------( 496      ( 2021 07:43 )             46.4                         16.5   11.32 )-----------( 514      ( 2021 07:05 )             47.4                         14.8   7.93  )-----------( 428      ( 2021 06:40 )             43.4       Imaging:
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
## 2021-06-03 NOTE — CHART NOTE - NSCHARTNOTEFT_GEN_A_CORE
Per chart pt with PMH: gastroparesis, presents with nausea, vomiting, and abdominal pain, found with gastroparesis, elevated LFTs. s/p EGD with biopsy (05/27), found gastritis and esophagitis.     Factors impacting intake: [x] none [ ] nausea  [ ] vomiting [ ] diarrhea [ ] constipation  [ ]chewing problems [ ] swallowing issues  [ ] other:     Diet Prescription: Diet, NPO (06-03-21 @ 14:08)    Intake: Pt currently NPO for MRCP this afternoon per RN. Pt states he received breakfast and lunch today (06/03) which he tolerated well- stated no nausea/vomiting today (06/03). Was receiving PO diet 06/01-06/03- per flow sheets pt consumed % of documented meals on those days.    Current Weight: (06/03) 114.4kg (05/25) 111kg (05/21) 120kg  % Weight Change: Unable to assess weight changes as ?accuracy of bed weights    No noted edema as per flow sheets.     No signs of fat loss or muscle wasting per visual nutrition focused physical exam     Pertinent Medications: MEDICATIONS  (STANDING):  pantoprazole    Tablet 40 milliGRAM(s) Oral before breakfast  senna 2 Tablet(s) Oral at bedtime  sucralfate 1 Gram(s) Oral four times a day    MEDICATIONS  (PRN):  bisacodyl 5 milliGRAM(s) Oral every 12 hours PRN Constipation  saline laxative (FLEET) Rectal Enema 1 Enema Rectal daily PRN constipation    Pertinent Labs: 06-03 Na136 mmol/L Glu 109 mg/dL<H> K+ 3.7 mmol/L Cr  0.85 mg/dL BUN 10 mg/dL 06-01 Phos 2.4 mg/dL<L> 06-03 Alb 3.3 g/dL      Skin: no pressure injuries per flow sheets    Estimated Needs:   [x] no change since previous assessment: 05/25/2021  [ ] recalculated:     Previous Nutrition Diagnosis:   [x] Severe malnutrition in the context of acute illness.     Etiology inadequate energy, protein intake in the presence of gastroparesis, intractable N/V.     Signs/Symptoms >5% weight loss in 2 weeks and <50% nutritional intake x >5 days.     Goal/Expected Outcome Pt will consume >/=75% nutritional needs via tolerated route (GOAL not met- pt NPO currently).    Nutrition Diagnosis is [x ] ongoing  [ ] resolved [ ] not applicable     New Nutrition Diagnosis: [x ] not applicable        Interventions:   Recommend  [x] When medically feasible advance diet to regular, low fat, low fiber   [ ] Nutrition Supplement  [ ] Nutrition Support  [ ] Other:     Monitoring and Evaluation:   [x] PO intake [ x ] Tolerance to diet prescription [ x ] weights [ x ] labs[ x ] follow up per protocol  [ ] other:

## 2021-06-03 NOTE — SBIRT NOTE ADULT - NSSBIRTDRGPOSREINDET_GEN_A_CORE
Pt is aware of his THC use and the cause of the hospitalization  Pt stated he will not be engaging in that behavior

## 2021-06-03 NOTE — PROGRESS NOTE ADULT - ASSESSMENT
26 y/o male with likely gastroparesis and hyperemesis from THC abuse s/p egd. increasing liver function tests but improving N/V and able to tolerate food.     Problem/Plan - 1:  ·  Problem: Intractable vomiting with nausea.  Plan: Presumed Gastroparesis  secondary to THC overuse urine remains positive for thc  HIDA scan normal   GI consult appreciated   KUB benign   CT abd: No bowel obstruction. No gastric distention. Normal appendix.  off IVF , IV Zofran, analgesics prn.      Problem/Plan - 2:  ·  Problem: Liver enzyme elevation.  Plan: Negative HIDA   Hep serologies negative   EBV with evidence of prior infection.   Hepatology consulted. Pt could not tolerate biopsy due to becoming hypertensive and tachycardic to 140s.      Problem/Plan - 3:  ·  Problem: Electrolyte abnormality.  Plan: Secondary to intractable vomiting.      Problem/Plan - 4:  ·  Problem: Chest pain, unspecified type.  Plan: No chest pain today  EKG: NSR @71bpm, TWI leads I, II, III, AVF, V4-V6. Echo wnl.   Troponin neg X2   Cardiology consult appreciated. Stress test as out patient.      Problem/Plan - 5:  ·  Problem: Bladder wall thickening.  Plan: CT abdomen: Bladder was not well-distended. Focal thickening in enhancement of the anterior superior bladder, recommend correlation with urinalysis and direct visualization.  UA: positive markers, Urine Cx: negative  Urology consult appreciated   Pt will most likely need outpatient followup.      Problem/Plan - 6:  Problem: Preventive measure. Plan: DVTp: low risk, early ambulation
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
## 2021-06-03 NOTE — PROGRESS NOTE ADULT - ASSESSMENT
24 y/o male with likely gastroparesis and hyperemesis from THC abuse s/p egd. increasing liver function tests but improving N/V and able to tolerate food.     Problem/Plan - 1:  ·  Problem: Intractable vomiting with nausea.  Plan: Presumed Gastroparesis  secondary to THC overuse urine remains positive for thc  HIDA scan normal. MRCP was normal.   GI consult appreciated. Pt is tolerating a normal diet.      Problem/Plan - 2:  ·  Problem: Liver enzyme elevation.  Plan: Negative HIDA   Hep serologies negative. MRCP today was normal. EBV with evidence of prior infection.   Hepatology consulted.   Pt could not tolerate biopsy due to becoming hypertensive and tachycardic to 140s.      Problem/Plan - 3:  ·  Problem: Electrolyte abnormality.  Plan: Secondary to intractable vomiting.      Problem/Plan - 4:  ·  Problem: Chest pain, unspecified type.  Plan: No chest pain today  EKG: NSR @71bpm, TWI leads I, II, III, AVF, V4-V6. Echo wnl.   Troponin neg X2   Cardiology consult appreciated. Stress test as out patient.      Problem/Plan - 5:  ·  Problem: Bladder wall thickening.  Plan: CT abdomen: Bladder was not well-distended. Focal thickening in enhancement of the anterior superior bladder, recommend correlation with urinalysis and direct visualization.  UA: positive markers, Urine Cx: negative  Urology consult appreciated   Pt will most likely need outpatient followup.      Problem/Plan - 6:  Problem: Preventive measure. Plan: DVTp: low risk, early ambulation        26 y/o male with likely gastroparesis and hyperemesis from THC abuse s/p egd. increasing liver function tests but improving N/V and able to tolerate food.      LACTULOSE ORDERD. AMMONIA, IGG, IGM, LKM ETC ORDERED.      Problem/Plan - 1:  ·  Problem: Intractable vomiting with nausea.  Plan: Presumed Gastroparesis  secondary to THC overuse urine remains positive for thc  HIDA scan normal. MRCP was normal.   GI consult appreciated. Pt is tolerating a normal diet.      Problem/Plan - 2:  ·  Problem: Liver enzyme elevation.  Plan: Negative HIDA   Hep serologies negative. MRCP today was normal. EBV with evidence of prior infection.   Hepatology consulted.   Pt could not tolerate biopsy due to becoming hypertensive and tachycardic to 140s.      Problem/Plan - 3:  ·  Problem: Electrolyte abnormality.  Plan: Secondary to intractable vomiting.      Problem/Plan - 4:  ·  Problem: Chest pain, unspecified type.  Plan: No chest pain today  EKG: NSR @71bpm, TWI leads I, II, III, AVF, V4-V6. Echo wnl.   Troponin neg X2   Cardiology consult appreciated. Stress test as out patient.      Problem/Plan - 5:  ·  Problem: Bladder wall thickening.  Plan: CT abdomen: Bladder was not well-distended. Focal thickening in enhancement of the anterior superior bladder, recommend correlation with urinalysis and direct visualization.  UA: positive markers, Urine Cx: negative  Urology consult appreciated   Pt will most likely need outpatient followup.      Problem/Plan - 6:  Problem: Preventive measure. Plan: DVTp: low risk, early ambulation

## 2021-06-03 NOTE — CONSULT NOTE ADULT - ASSESSMENT
25 year old with a history of abnormal ALT for many years which he reports improving after more than 100 lb weight loss now admitted with nausea/vomiting with known gastroparesis secondary to marijuana use who has been noted to have a steady rise in predominantly ALT with some rise in AST and otherwise normal liver chemistries, normal liver imaging and a slightly elevated INR.    The differential of this acute ALT elevation includes acute viral hepatitis, autoimmune disease, transient ischemia, drug induced liver injury (DILI) and hereditary disease. Of these, most likely related to DILI. I would recommend stopping all new medications which were started this admission, if possible.     I would send off HCVRNA, Hepatitis E IgM and IgG, anti-LKM AB, anti-SLA AB, serum IgG, serum IgA and serum IgM, alpha 1 anti-trypsin, TSH and free T4. I would also obtain MRI/MRCP.    I would not perform liver biopsy at this time but would recommend waiting for the above tests and possible self-resolution with cessation of new medications.    Once his liver tests have plateaued or begin to decline, he can be discharged home with follow up in our office within 7 days ([phone])    I spoke with him at length and explained the plan and answered his questions    I spoke with Dr. Gonzalez of [facility] medicine and reviewed plan

## 2021-06-04 LAB
A1AT SERPL-MCNC: 171 MG/DL — SIGNIFICANT CHANGE UP (ref 90–200)
ALBUMIN SERPL ELPH-MCNC: 3.1 G/DL — LOW (ref 3.3–5)
ALP SERPL-CCNC: 99 U/L — SIGNIFICANT CHANGE UP (ref 40–120)
ALT FLD-CCNC: 659 U/L — HIGH (ref 12–78)
AMMONIA BLD-MCNC: 39 UMOL/L — HIGH (ref 11–32)
ANION GAP SERPL CALC-SCNC: 8 MMOL/L — SIGNIFICANT CHANGE UP (ref 5–17)
AST SERPL-CCNC: 181 U/L — HIGH (ref 15–37)
BILIRUB SERPL-MCNC: 0.7 MG/DL — SIGNIFICANT CHANGE UP (ref 0.2–1.2)
BUN SERPL-MCNC: 7 MG/DL — SIGNIFICANT CHANGE UP (ref 7–23)
CALCIUM SERPL-MCNC: 9.3 MG/DL — SIGNIFICANT CHANGE UP (ref 8.5–10.1)
CHLORIDE SERPL-SCNC: 102 MMOL/L — SIGNIFICANT CHANGE UP (ref 96–108)
CO2 SERPL-SCNC: 27 MMOL/L — SIGNIFICANT CHANGE UP (ref 22–31)
CREAT SERPL-MCNC: 0.88 MG/DL — SIGNIFICANT CHANGE UP (ref 0.5–1.3)
GLUCOSE SERPL-MCNC: 115 MG/DL — HIGH (ref 70–99)
HCT VFR BLD CALC: 43.6 % — SIGNIFICANT CHANGE UP (ref 39–50)
HGB BLD-MCNC: 14.5 G/DL — SIGNIFICANT CHANGE UP (ref 13–17)
MCHC RBC-ENTMCNC: 28.4 PG — SIGNIFICANT CHANGE UP (ref 27–34)
MCHC RBC-ENTMCNC: 33.3 GM/DL — SIGNIFICANT CHANGE UP (ref 32–36)
MCV RBC AUTO: 85.5 FL — SIGNIFICANT CHANGE UP (ref 80–100)
NRBC # BLD: 0 /100 WBCS — SIGNIFICANT CHANGE UP (ref 0–0)
PLATELET # BLD AUTO: 460 K/UL — HIGH (ref 150–400)
POTASSIUM SERPL-MCNC: 3.6 MMOL/L — SIGNIFICANT CHANGE UP (ref 3.5–5.3)
POTASSIUM SERPL-SCNC: 3.6 MMOL/L — SIGNIFICANT CHANGE UP (ref 3.5–5.3)
PROT SERPL-MCNC: 7.4 GM/DL — SIGNIFICANT CHANGE UP (ref 6–8.3)
RBC # BLD: 5.1 M/UL — SIGNIFICANT CHANGE UP (ref 4.2–5.8)
RBC # FLD: 13.5 % — SIGNIFICANT CHANGE UP (ref 10.3–14.5)
SODIUM SERPL-SCNC: 137 MMOL/L — SIGNIFICANT CHANGE UP (ref 135–145)
T4 AB SER-ACNC: 9.7 UG/DL — SIGNIFICANT CHANGE UP (ref 4.6–12)
TSH SERPL-MCNC: 1.81 UIU/ML — SIGNIFICANT CHANGE UP (ref 0.36–3.74)
WBC # BLD: 8.31 K/UL — SIGNIFICANT CHANGE UP (ref 3.8–10.5)
WBC # FLD AUTO: 8.31 K/UL — SIGNIFICANT CHANGE UP (ref 3.8–10.5)

## 2021-06-04 PROCEDURE — 99231 SBSQ HOSP IP/OBS SF/LOW 25: CPT | Mod: GC

## 2021-06-04 PROCEDURE — 99233 SBSQ HOSP IP/OBS HIGH 50: CPT

## 2021-06-04 RX ADMIN — Medication 1 GRAM(S): at 05:03

## 2021-06-04 RX ADMIN — LACTULOSE 20 GRAM(S): 10 SOLUTION ORAL at 17:34

## 2021-06-04 RX ADMIN — Medication 1 GRAM(S): at 23:06

## 2021-06-04 RX ADMIN — PANTOPRAZOLE SODIUM 40 MILLIGRAM(S): 20 TABLET, DELAYED RELEASE ORAL at 05:03

## 2021-06-04 RX ADMIN — Medication 1 GRAM(S): at 17:34

## 2021-06-04 RX ADMIN — SENNA PLUS 2 TABLET(S): 8.6 TABLET ORAL at 21:01

## 2021-06-04 RX ADMIN — LACTULOSE 20 GRAM(S): 10 SOLUTION ORAL at 05:03

## 2021-06-04 RX ADMIN — Medication 1 GRAM(S): at 11:10
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
## 2021-06-04 NOTE — PROGRESS NOTE ADULT - SUBJECTIVE AND OBJECTIVE BOX
Interval Events:     Allergies:  No Known Allergies        Hospital Medications:  bisacodyl 5 milliGRAM(s) Oral every 12 hours PRN  lactulose Syrup 20 Gram(s) Oral two times a day  pantoprazole    Tablet 40 milliGRAM(s) Oral before breakfast  saline laxative (FLEET) Rectal Enema 1 Enema Rectal daily PRN  senna 2 Tablet(s) Oral at bedtime  sucralfate 1 Gram(s) Oral four times a day      PMHX/PSHX:  No pertinent past medical history    Obesity    Gastroparesis    No significant past surgical history    Status post fracture of left tibia    No significant past surgical history        Family history:  FH: diabetes mellitus    No pertinent family history in first degree relatives        ROS:   General:  No wt loss, fevers, chills, night sweats, fatigue,   Eyes:  Good vision, no reported pain  ENT:  No sore throat, pain, runny nose, dysphagia  CV:  No pain, palpitations, hypo/hypertension  Pulm:  No dyspnea, cough, tachypnea, wheezing  GI:  As per HPI  :  No pain, bleeding, incontinence, nocturia  Muscle:  No pain, weakness  Neuro:  No weakness, tingling, memory problems  Psych:  No fatigue, insomnia, mood problems, depression  Endocrine:  No polyuria, polydipsia, cold/heat intolerance  Heme:  No petechiae, ecchymosis, easy bruisability  Skin:  No rash, tattoos, scars, edema      PHYSICAL EXAM:   Vital Signs:  Vital Signs Last 24 Hrs  T(C): 36.7 (2021 10:23), Max: 36.9 (2021 23:52)  T(F): 98 (2021 10:23), Max: 98.4 (2021 23:52)  HR: 106 (2021 10:23) (104 - 115)  BP: 134/80 (2021 10:23) (118/81 - 134/80)  BP(mean): --  RR: 18 (2021 10:23) (18 - 18)  SpO2: 96% (2021 10:23) (96% - 98%)  Daily     Daily Weight in k.8 (2021 04:49)      21 @ 07:01  -  21 @ 07:00  --------------------------------------------------------  IN: 720 mL / OUT: 0 mL / NET: 720 mL        GENERAL:  No acute distress  HEENT:  Normocephalic/atraumtic,  no scleral icterus  CHEST:  No accessory muscle use  HEART:  Regular rate and rhythm  ABDOMEN:  Soft, non-tender, non-distended, normoactive bowel sounds, no masses, no hepato-splenomegaly, no signs of chronic liver disease  EXTREMITIES:  No cyanosis, clubbing, or edema  SKIN:  No rash  NEURO:  Alert and oriented x 3, no asterixis, no tremor    LABS:                        14.5   8.31  )-----------( 460      ( 2021 06:32 )             43.6     Mean Cell Volume: 85.5 fl (21 @ 06:32)    06-04    137  |  102  |  7   ----------------------------<  115<H>  3.6   |  27  |  0.88    Ca    9.3      2021 06:32    TPro  7.4  /  Alb  3.1<L>  /  TBili  0.7  /  DBili  x   /  AST  181<H>  /  ALT  659<H>  /  AlkPhos  99  06-04    LIVER FUNCTIONS - ( 2021 06:32 )  Alb: 3.1 g/dL / Pro: 7.4 gm/dL / ALK PHOS: 99 U/L / ALT: 659 U/L / AST: 181 U/L / GGT: x               Amylase Serum--      Lipase serum--       [id number]                          14.5   8.31  )-----------( 460      ( 2021 06:32 )             43.6                         15.5   11.73 )-----------( 496      ( 2021 07:43 )             46.4                         16.5   11.32 )-----------( 514      ( 2021 07:05 )             47.4       Imaging:           Interval Events: No changes overnight     Allergies:  No Known Allergies        Hospital Medications:  bisacodyl 5 milliGRAM(s) Oral every 12 hours PRN  lactulose Syrup 20 Gram(s) Oral two times a day  pantoprazole    Tablet 40 milliGRAM(s) Oral before breakfast  saline laxative (FLEET) Rectal Enema 1 Enema Rectal daily PRN  senna 2 Tablet(s) Oral at bedtime  sucralfate 1 Gram(s) Oral four times a day      PMHX/PSHX:  No pertinent past medical history    Obesity    Gastroparesis    No significant past surgical history    Status post fracture of left tibia    No significant past surgical history        Family history:  FH: diabetes mellitus    No pertinent family history in first degree relatives        ROS:   General:  No wt loss, fevers, chills, night sweats, fatigue,   Eyes:  Good vision, no reported pain  ENT:  No sore throat, pain, runny nose, dysphagia  CV:  No pain, palpitations, hypo/hypertension  Pulm:  No dyspnea, cough, tachypnea, wheezing  GI:  As per HPI  :  No pain, bleeding, incontinence, nocturia  Muscle:  No pain, weakness  Neuro:  No weakness, tingling, memory problems  Psych:  No fatigue, insomnia, mood problems, depression  Endocrine:  No polyuria, polydipsia, cold/heat intolerance  Heme:  No petechiae, ecchymosis, easy bruisability  Skin:  No rash, tattoos, scars, edema      PHYSICAL EXAM:   Vital Signs:  Vital Signs Last 24 Hrs  T(C): 36.7 (2021 10:23), Max: 36.9 (2021 23:52)  T(F): 98 (2021 10:23), Max: 98.4 (2021 23:52)  HR: 106 (2021 10:23) (104 - 115)  BP: 134/80 (2021 10:23) (118/81 - 134/80)  BP(mean): --  RR: 18 (2021 10:23) (18 - 18)  SpO2: 96% (2021 10:23) (96% - 98%)  Daily     Daily Weight in k.8 (2021 04:49)      21 @ 07:01  -  21 @ 07:00  --------------------------------------------------------  IN: 720 mL / OUT: 0 mL / NET: 720 mL        GENERAL:  No acute distress  HEENT:  Normocephalic/atraumtic,  no scleral icterus  CHEST:  No accessory muscle use  HEART:  Regular rate and rhythm  ABDOMEN:  Soft, non-tender, non-distended, normoactive bowel sounds, no masses, no hepato-splenomegaly, no signs of chronic liver disease  EXTREMITIES:  No cyanosis, clubbing, or edema  SKIN:  No rash  NEURO:  Alert and oriented x 3, no asterixis, no tremor    LABS:                        14.5   8.31  )-----------( 460      ( 2021 06:32 )             43.6     Mean Cell Volume: 85.5 fl (21 @ 06:32)    06-04    137  |  102  |  7   ----------------------------<  115<H>  3.6   |  27  |  0.88    Ca    9.3      2021 06:32    TPro  7.4  /  Alb  3.1<L>  /  TBili  0.7  /  DBili  x   /  AST  181<H>  /  ALT  659<H>  /  AlkPhos  99  06-04    LIVER FUNCTIONS - ( 2021 06:32 )  Alb: 3.1 g/dL / Pro: 7.4 gm/dL / ALK PHOS: 99 U/L / ALT: 659 U/L / AST: 181 U/L / GGT: x               Amylase Serum--      Lipase serum--       [id number]                          14.5   8.31  )-----------( 460      ( 2021 06:32 )             43.6                         15.5   11.73 )-----------( 496      ( 2021 07:43 )             46.4                         16.5   11.32 )-----------( 514      ( 2021 07:05 )             47.4       Imaging:

## 2021-06-04 NOTE — PROGRESS NOTE ADULT - ASSESSMENT
26 y/o male with likely gastroparesis and hyperemesis from THC abuse s/p egd. increasing liver function tests but improving N/V and able to tolerate food.      LACTULOSE ORDERD. AMMONIA, IGG, IGM, LKM ETC ORDERED.      Problem/Plan - 1:  ·  Problem: Intractable vomiting with nausea.  Plan: Presumed Gastroparesis  secondary to THC overuse urine remains positive for thc  HIDA scan normal. MRCP was normal.   GI consult appreciated. Pt is tolerating a normal diet.      Problem/Plan - 2:  ·  Problem: Liver enzyme elevation.  Plan: Negative HIDA   Hep serologies negative. MRCP today was normal. EBV with evidence of prior infection.   Hepatology consulted. autoimmune workup sent.      Pt could not tolerate biopsy due to becoming hypertensive and tachycardic to 140s.      Problem/Plan - 3:  ·  Problem: Electrolyte abnormality.  Plan: Secondary to intractable vomiting.      Problem/Plan - 4:  ·  Problem: Chest pain, unspecified type.  Plan: No chest pain today  EKG: NSR @71bpm, TWI leads I, II, III, AVF, V4-V6. Echo wnl.   Troponin neg X2   Cardiology consult appreciated. Stress test as out patient.      Problem/Plan - 5:  ·  Problem: Bladder wall thickening.  Plan: CT abdomen: Bladder was not well-distended. Focal thickening in enhancement of the anterior superior bladder, recommend correlation with urinalysis and direct visualization.  UA: positive markers, Urine Cx: negative  Urology consult appreciated   Pt will most likely need outpatient followup.      Problem/Plan - 6:  Problem: Preventive measure. Plan: DVTp: low risk, early ambulation    Dispo: dc home once LFT's are down trending.

## 2021-06-04 NOTE — PROGRESS NOTE ADULT - ASSESSMENT
Abnormal liver tests of unclear etiology which continue to increase.  Last ,  with normal alkaline phosphatase, total bilirubin  MRI/MRCP 6-3-2021 with normal appearing liver and biliary tree  Acute hepatitis work up in progress.  Please repeat INR, send serologies for EBV, CMV and HSV  If liver enzymes continue to increase, would continue liver biopsy if work up non-diagnostic

## 2021-06-05 LAB
ALBUMIN SERPL ELPH-MCNC: 2.9 G/DL — LOW (ref 3.3–5)
ALP SERPL-CCNC: 92 U/L — SIGNIFICANT CHANGE UP (ref 40–120)
ALT FLD-CCNC: 555 U/L — HIGH (ref 12–78)
ANION GAP SERPL CALC-SCNC: 8 MMOL/L — SIGNIFICANT CHANGE UP (ref 5–17)
AST SERPL-CCNC: 109 U/L — HIGH (ref 15–37)
BILIRUB SERPL-MCNC: 0.5 MG/DL — SIGNIFICANT CHANGE UP (ref 0.2–1.2)
BUN SERPL-MCNC: 6 MG/DL — LOW (ref 7–23)
CALCIUM SERPL-MCNC: 9.4 MG/DL — SIGNIFICANT CHANGE UP (ref 8.5–10.1)
CHLORIDE SERPL-SCNC: 104 MMOL/L — SIGNIFICANT CHANGE UP (ref 96–108)
CO2 SERPL-SCNC: 26 MMOL/L — SIGNIFICANT CHANGE UP (ref 22–31)
CREAT SERPL-MCNC: 0.84 MG/DL — SIGNIFICANT CHANGE UP (ref 0.5–1.3)
EBV EA AB SER IA-ACNC: 22.5 U/ML — HIGH
EBV EA AB TITR SER IF: POSITIVE
EBV EA IGG SER-ACNC: POSITIVE
EBV NA IGG SER IA-ACNC: 34.5 U/ML — HIGH
EBV PATRN SPEC IB-IMP: SIGNIFICANT CHANGE UP
EBV VCA IGG AVIDITY SER QL IA: POSITIVE
EBV VCA IGM SER IA-ACNC: 363 U/ML — HIGH
EBV VCA IGM SER IA-ACNC: <10 U/ML — SIGNIFICANT CHANGE UP
EBV VCA IGM TITR FLD: NEGATIVE — SIGNIFICANT CHANGE UP
GLUCOSE SERPL-MCNC: 105 MG/DL — HIGH (ref 70–99)
HCV RNA SERPL NAA DL=5-ACNC: SIGNIFICANT CHANGE UP IU/ML
HCV RNA SPEC NAA+PROBE-LOG IU: SIGNIFICANT CHANGE UP LOG10IU/ML
INR BLD: 1.11 RATIO — SIGNIFICANT CHANGE UP (ref 0.88–1.16)
LKM AB SER-ACNC: <20.1 UNITS — SIGNIFICANT CHANGE UP (ref 0–20)
POTASSIUM SERPL-MCNC: 3.5 MMOL/L — SIGNIFICANT CHANGE UP (ref 3.5–5.3)
POTASSIUM SERPL-SCNC: 3.5 MMOL/L — SIGNIFICANT CHANGE UP (ref 3.5–5.3)
PROT SERPL-MCNC: 7.2 GM/DL — SIGNIFICANT CHANGE UP (ref 6–8.3)
PROTHROM AB SERPL-ACNC: 12.8 SEC — SIGNIFICANT CHANGE UP (ref 10.6–13.6)
SODIUM SERPL-SCNC: 138 MMOL/L — SIGNIFICANT CHANGE UP (ref 135–145)

## 2021-06-05 PROCEDURE — 99231 SBSQ HOSP IP/OBS SF/LOW 25: CPT | Mod: GC

## 2021-06-05 PROCEDURE — 99233 SBSQ HOSP IP/OBS HIGH 50: CPT

## 2021-06-05 RX ORDER — IBUPROFEN 200 MG
400 TABLET ORAL EVERY 4 HOURS
Refills: 0 | Status: DISCONTINUED | OUTPATIENT
Start: 2021-06-05 | End: 2021-06-06

## 2021-06-05 RX ADMIN — LACTULOSE 20 GRAM(S): 10 SOLUTION ORAL at 17:48

## 2021-06-05 RX ADMIN — LACTULOSE 20 GRAM(S): 10 SOLUTION ORAL at 05:04

## 2021-06-05 RX ADMIN — Medication 1 GRAM(S): at 11:53

## 2021-06-05 RX ADMIN — Medication 1 GRAM(S): at 17:48

## 2021-06-05 RX ADMIN — SENNA PLUS 2 TABLET(S): 8.6 TABLET ORAL at 21:50

## 2021-06-05 RX ADMIN — Medication 1 GRAM(S): at 05:05
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
## 2021-06-05 NOTE — PROGRESS NOTE ADULT - ASSESSMENT
26 y/o male with likely gastroparesis and hyperemesis from THC abuse s/p egd. increasing liver function tests but improving N/V and able to tolerate food.      LACTULOSE ORDERD. AMMONIA, IGG, IGM, LKM ETC.      Problem/Plan - 1:  ·  Problem: Intractable vomiting with nausea.  Plan: Presumed Gastroparesis  secondary to THC overuse urine remains positive for thc  HIDA scan normal. MRCP was normal.   GI consult appreciated. Pt is tolerating a normal diet.      Problem/Plan - 2:  ·  Problem: Liver enzyme elevation.  Plan: Negative HIDA   Hep serologies negative. MRCP  was normal. EBV with evidence of prior infection.   Hepatology consulted. autoimmune workup sent.      Pt could not tolerate biopsy due to becoming hypertensive and tachycardic to 140s.   LFT imroving   Problem/Plan - 3:  ·  Problem: Electrolyte abnormality.  Plan: Secondary to intractable vomiting.      Problem/Plan - 4:  ·  Problem: Chest pain, unspecified type.  Plan: No chest pain today  EKG: NSR @71bpm, TWI leads I, II, III, AVF, V4-V6. Echo wnl.   Troponin neg X2   Cardiology consult appreciated. Stress test as out patient.      Problem/Plan - 5:  ·  Problem: Bladder wall thickening.  Plan: CT abdomen: Bladder was not well-distended. Focal thickening in enhancement of the anterior superior bladder, recommend correlation with urinalysis and direct visualization.  UA: positive markers, Urine Cx: negative  Urology consult appreciated   Pt will most likely need outpatient followup.      Problem/Plan - 6:  Problem: Preventive measure. Plan: DVTp: low risk, early ambulation    Dispo: dc home once LFT's are persistently down trending.

## 2021-06-05 NOTE — PROGRESS NOTE ADULT - ASSESSMENT
25 year old admitted with nausea/vomiting now improved.  Liver tests were rising likely secondary to medications. New therapies stopped. Repeat transaminases remain elevated but decreasing.  Liver work up in progress and labs pending  I would repeat ALT/AST tomorrow. If they are improving and liver function remains normal, patient can be discharged with follow up with our service as an outpatient

## 2021-06-05 NOTE — PROGRESS NOTE ADULT - SUBJECTIVE AND OBJECTIVE BOX
Chief Complaint:  Patient is a 25y old  Male who presents with a chief complaint of Nausea/vomiting (2021 11:40)      Interval Events: No new events overnight     Allergies:  No Known Allergies      Hospital Medications:  bisacodyl 5 milliGRAM(s) Oral every 12 hours PRN  lactulose Syrup 20 Gram(s) Oral two times a day  saline laxative (FLEET) Rectal Enema 1 Enema Rectal daily PRN  senna 2 Tablet(s) Oral at bedtime  sucralfate 1 Gram(s) Oral four times a day      PMHX/PSHX:  No pertinent past medical history    Obesity    Gastroparesis    No significant past surgical history    Status post fracture of left tibia    No significant past surgical history        Family history:  FH: diabetes mellitus    No pertinent family history in first degree relatives        ROS:     General:  No wt loss, fevers, chills, night sweats, fatigue,   Eyes:  Good vision, no reported pain  ENT:  No sore throat, pain, runny nose, dysphagia  CV:  No pain, palpitations, hypo/hypertension  Resp:  No dyspnea, cough, tachypnea, wheezing  GI:  See HPI  :  No pain, bleeding, incontinence, nocturia  Muscle:  No pain, weakness  Neuro:  No weakness, tingling, memory problems  Psych:  No fatigue, insomnia, mood problems, depression  Endocrine:  No polyuria, polydipsia, cold/heat intolerance  Heme:  No petechiae, ecchymosis, easy bruisability  Skin:  No rash, edema      PHYSICAL EXAM:     GENERAL:  Appears stated age, well-groomed, well-nourished, no distress  HEENT:  NC/AT,  conjunctivae clear, sclera -anicteric  CHEST:  Full & symmetric excursion, no increased effort, breath sounds clear  HEART:  Regular rhythm, S1, S2, no murmur/rub/S3/S4,  no edema  ABDOMEN:  Soft, non-tender, non-distended, normoactive bowel sounds,  no masses ,no hepato-splenomegaly,   EXTREMITIES:  no cyanosis,clubbing or edema  SKIN:  No rash/erythema/ecchymoses/petechiae/wounds/abscess/warm/dry  NEURO:  Alert, oriented    Vital Signs:  Vital Signs Last 24 Hrs  T(C): 36.4 (2021 11:33), Max: 37.3 (2021 04:49)  T(F): 97.5 (2021 11:33), Max: 99.1 (2021 04:49)  HR: 101 (2021 11:33) (88 - 105)  BP: 134/79 (2021 11:33) (128/86 - 155/96)  BP(mean): --  RR: 18 (2021 11:33) (18 - 18)  SpO2: 97% (2021 11:33) (97% - 99%)  Daily     Daily Weight in k.8 (2021 04:49)    LABS:                        14.5   8.31  )-----------( 460      ( 2021 06:32 )             43.6     06-05    138  |  104  |  6<L>  ----------------------------<  105<H>  3.5   |  26  |  0.84    Ca    9.4      2021 08:38    TPro  7.2  /  Alb  2.9<L>  /  TBili  0.5  /  DBili  .22<H>  /  AST  109<H>  /  ALT  555<H>  /  AlkPhos  92  06-05    LIVER FUNCTIONS - ( 2021 08:38 )  Alb: 2.9 g/dL / Pro: 7.2 gm/dL / ALK PHOS: 92 U/L / ALT: 555 U/L / AST: 109 U/L / GGT: x           PT/INR - ( 2021 08:38 )   PT: 12.8 sec;   INR: 1.11 ratio                 Imaging:

## 2021-06-06 ENCOUNTER — TRANSCRIPTION ENCOUNTER (OUTPATIENT)
Age: 26
End: 2021-06-06

## 2021-06-06 VITALS
OXYGEN SATURATION: 98 % | SYSTOLIC BLOOD PRESSURE: 130 MMHG | HEART RATE: 100 BPM | DIASTOLIC BLOOD PRESSURE: 82 MMHG | TEMPERATURE: 97 F | RESPIRATION RATE: 18 BRPM

## 2021-06-06 LAB
ALBUMIN SERPL ELPH-MCNC: 2.8 G/DL — LOW (ref 3.3–5)
ALP SERPL-CCNC: 80 U/L — SIGNIFICANT CHANGE UP (ref 40–120)
ALT FLD-CCNC: 425 U/L — HIGH (ref 12–78)
ANION GAP SERPL CALC-SCNC: 7 MMOL/L — SIGNIFICANT CHANGE UP (ref 5–17)
AST SERPL-CCNC: 74 U/L — HIGH (ref 15–37)
BILIRUB SERPL-MCNC: 0.4 MG/DL — SIGNIFICANT CHANGE UP (ref 0.2–1.2)
BUN SERPL-MCNC: 6 MG/DL — LOW (ref 7–23)
CALCIUM SERPL-MCNC: 9 MG/DL — SIGNIFICANT CHANGE UP (ref 8.5–10.1)
CHLORIDE SERPL-SCNC: 105 MMOL/L — SIGNIFICANT CHANGE UP (ref 96–108)
CO2 SERPL-SCNC: 26 MMOL/L — SIGNIFICANT CHANGE UP (ref 22–31)
CREAT SERPL-MCNC: 0.74 MG/DL — SIGNIFICANT CHANGE UP (ref 0.5–1.3)
GLUCOSE SERPL-MCNC: 105 MG/DL — HIGH (ref 70–99)
POTASSIUM SERPL-MCNC: 3.6 MMOL/L — SIGNIFICANT CHANGE UP (ref 3.5–5.3)
POTASSIUM SERPL-SCNC: 3.6 MMOL/L — SIGNIFICANT CHANGE UP (ref 3.5–5.3)
PROT SERPL-MCNC: 6.6 GM/DL — SIGNIFICANT CHANGE UP (ref 6–8.3)
SODIUM SERPL-SCNC: 138 MMOL/L — SIGNIFICANT CHANGE UP (ref 135–145)

## 2021-06-06 PROCEDURE — 99239 HOSP IP/OBS DSCHRG MGMT >30: CPT

## 2021-06-06 RX ORDER — SUCRALFATE 1 G
1 TABLET ORAL
Qty: 120 | Refills: 0
Start: 2021-06-06

## 2021-06-06 RX ORDER — LACTULOSE 10 G/15ML
30 SOLUTION ORAL
Qty: 1000 | Refills: 0
Start: 2021-06-06

## 2021-06-06 RX ADMIN — LACTULOSE 20 GRAM(S): 10 SOLUTION ORAL at 17:30

## 2021-06-06 RX ADMIN — LACTULOSE 20 GRAM(S): 10 SOLUTION ORAL at 05:41

## 2021-06-06 RX ADMIN — Medication 1 GRAM(S): at 11:25

## 2021-06-06 RX ADMIN — Medication 1 GRAM(S): at 17:30

## 2021-06-06 RX ADMIN — Medication 1 GRAM(S): at 00:20

## 2021-06-06 RX ADMIN — Medication 1 GRAM(S): at 05:41

## 2021-06-06 NOTE — DISCHARGE NOTE PROVIDER - NSDCMRMEDTOKEN_GEN_ALL_CORE_FT
lactulose 10 g/15 mL oral syrup: 30 milliliter(s) orally 2 times a day  Pepcid 20 mg oral tablet: orally once a day  sucralfate 1 g oral tablet: 1 tab(s) orally 4 times a day  Zofran 8 mg oral tablet: 1 tab(s) orally every 8 hours, As Needed

## 2021-06-06 NOTE — DISCHARGE NOTE PROVIDER - PROVIDER TOKENS
PROVIDER:[TOKEN:[[id number]],FOLLOWUP:[1 week]],PROVIDER:[TOKEN:[[id number]],FOLLOWUP:[2 weeks]],PROVIDER:[TOKEN:[[id number]],FOLLOWUP:[2 weeks]]
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
## 2021-06-06 NOTE — DISCHARGE NOTE PROVIDER - CARE PROVIDER_API CALL
Darrel Zamora)  Gastroenterology  [address] [place] [address]  Phone: [phone]  Fax: [phone]  Follow Up Time: 1 week    Caro Dukes)  Urology  [address] [place], [place] [address]  Phone: [phone]  Fax: [phone]  Follow Up Time: 2 weeks    Angeline Quintero)  Cardiology; Interventional Cardiology  [address] [place], [place] [id number]  Phone: [phone]  Fax: [phone]  Follow Up Time: 2 weeks

## 2021-06-06 NOTE — DISCHARGE NOTE NURSING/CASE MANAGEMENT/SOCIAL WORK - PATIENT PORTAL LINK FT
You can access the FollowMyHealth Patient Portal offered by [facility] by registering at the following website: [email or web address]. By joining [employer]’s FollowMyHealth portal, you will also be able to view your health information using other applications (apps) compatible with our system.
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
## 2021-06-06 NOTE — PROGRESS NOTE ADULT - NSICDXPILOT_GEN_ALL_CORE
[place]

## 2021-06-06 NOTE — PROGRESS NOTE ADULT - PROVIDER SPECIALTY LIST ADULT
Gastroenterology
Hospitalist
Cardiology
Hepatology
Hospitalist
Cardiology
Gastroenterology
Gastroenterology
Hospitalist
Cardiology
Gastroenterology
Hospitalist
Gastroenterology
Gastroenterology
Hepatology
Urology
Gastroenterology
Gastroenterology
Hospitalist

## 2021-06-06 NOTE — DISCHARGE NOTE PROVIDER - NSDCCPCAREPLAN_GEN_ALL_CORE_FT
PRINCIPAL DISCHARGE DIAGNOSIS  Diagnosis: Intractable vomiting  Assessment and Plan of Treatment:       SECONDARY DISCHARGE DIAGNOSES  Diagnosis: Gastroparesis  Assessment and Plan of Treatment:

## 2021-06-06 NOTE — PROGRESS NOTE ADULT - REASON FOR ADMISSION
Nausea/vomiting

## 2021-06-06 NOTE — DISCHARGE NOTE PROVIDER - CARE PROVIDERS DIRECT ADDRESSES
[email or web address]

## 2021-06-06 NOTE — PROGRESS NOTE ADULT - NUTRITIONAL ASSESSMENT
This patient has been assessed with a concern for Malnutrition and has been determined to have a diagnosis/diagnoses of Severe protein-calorie malnutrition.    This patient is being managed with:   Diet Regular-  Entered: Lazarus  3 2021  1:11PM    
This patient has been assessed with a concern for Malnutrition and has been determined to have a diagnosis/diagnoses of Severe protein-calorie malnutrition.    This patient is being managed with:   Diet Regular-  Entered: Lazarus  3 2021  1:11PM    
This patient has been assessed with a concern for Malnutrition and has been determined to have a diagnosis/diagnoses of Severe protein-calorie malnutrition.    This patient is being managed with:   Diet Regular-  Entered: Jun 1 2021 12:56PM    
This patient has been assessed with a concern for Malnutrition and has been determined to have a diagnosis/diagnoses of Severe protein-calorie malnutrition.    This patient is being managed with:   Diet Clear Liquid-  Entered: May 25 2021 12:35PM    
This patient has been assessed with a concern for Malnutrition and has been determined to have a diagnosis/diagnoses of Severe protein-calorie malnutrition.    This patient is being managed with:   Diet Regular-  Entered: Lazarus  3 2021  1:11PM    
This patient has been assessed with a concern for Malnutrition and has been determined to have a diagnosis/diagnoses of Severe protein-calorie malnutrition.    This patient is being managed with:   Diet Regular-  Entered: Lazarus  3 2021  1:11PM    
This patient has been assessed with a concern for Malnutrition and has been determined to have a diagnosis/diagnoses of Severe protein-calorie malnutrition.    This patient is being managed with:   Diet Clear Liquid-  Entered: May 27 2021  6:35PM    
This patient has been assessed with a concern for Malnutrition and has been determined to have a diagnosis/diagnoses of Severe protein-calorie malnutrition.    This patient is being managed with:   Diet NPO after Midnight-     NPO Start Date: 26-May-2021   NPO Start Time: 23:59  Entered: May 26 2021 12:04PM    Diet Clear Liquid-  Entered: May 25 2021 12:35PM    
This patient has been assessed with a concern for Malnutrition and has been determined to have a diagnosis/diagnoses of Severe protein-calorie malnutrition.    This patient is being managed with:   Diet Clear Liquid-  Entered: May 27 2021  6:35PM    
This patient has been assessed with a concern for Malnutrition and has been determined to have a diagnosis/diagnoses of Severe protein-calorie malnutrition.    This patient is being managed with:   Diet NPO after Midnight-     NPO Start Date: 01-Jun-2021   NPO Start Time: 23:59  Except Medications  With Ice Chips/Sips of Water  Entered: Jun 1 2021  4:02PM    Diet Regular-  Entered: Jun 1 2021 12:56PM    
This patient has been assessed with a concern for Malnutrition and has been determined to have a diagnosis/diagnoses of Severe protein-calorie malnutrition.    This patient is being managed with:   Diet NPO after Midnight-     NPO Start Date: 01-Jun-2021   NPO Start Time: 23:59  Except Medications  With Ice Chips/Sips of Water  Entered: Jun 1 2021  4:02PM    Diet Regular-  Entered: Jun 1 2021 12:56PM

## 2021-06-08 PROBLEM — K31.84 GASTROPARESIS: Chronic | Status: ACTIVE | Noted: 2021-05-21

## 2021-06-08 PROBLEM — E66.9 OBESITY, UNSPECIFIED: Chronic | Status: ACTIVE | Noted: 2021-05-07

## 2021-06-08 LAB
HSV1 AB FLD QL: SIGNIFICANT CHANGE UP TITER
HSV2 AB FLD-ACNC: SIGNIFICANT CHANGE UP TITER
SOLUBLE LIVER IGG SER IA-ACNC: 1.1 — SIGNIFICANT CHANGE UP (ref 0–20)

## 2021-06-09 DIAGNOSIS — K71.2 TOXIC LIVER DISEASE WITH ACUTE HEPATITIS: ICD-10-CM

## 2021-06-09 DIAGNOSIS — R11.2 NAUSEA WITH VOMITING, UNSPECIFIED: ICD-10-CM

## 2021-06-09 DIAGNOSIS — R74.8 ABNORMAL LEVELS OF OTHER SERUM ENZYMES: ICD-10-CM

## 2021-06-09 DIAGNOSIS — E66.9 OBESITY, UNSPECIFIED: ICD-10-CM

## 2021-06-09 DIAGNOSIS — E43 UNSPECIFIED SEVERE PROTEIN-CALORIE MALNUTRITION: ICD-10-CM

## 2021-06-09 DIAGNOSIS — R10.9 UNSPECIFIED ABDOMINAL PAIN: ICD-10-CM

## 2021-06-09 DIAGNOSIS — E72.4 DISORDERS OF ORNITHINE METABOLISM: ICD-10-CM

## 2021-06-09 DIAGNOSIS — K31.84 GASTROPARESIS: ICD-10-CM

## 2021-06-09 DIAGNOSIS — E87.6 HYPOKALEMIA: ICD-10-CM

## 2021-06-09 DIAGNOSIS — Z53.09 PROCEDURE AND TREATMENT NOT CARRIED OUT BECAUSE OF OTHER CONTRAINDICATION: ICD-10-CM

## 2021-06-09 DIAGNOSIS — R07.9 CHEST PAIN, UNSPECIFIED: ICD-10-CM

## 2021-06-09 DIAGNOSIS — E83.52 HYPERCALCEMIA: ICD-10-CM

## 2021-06-09 DIAGNOSIS — F12.10 CANNABIS ABUSE, UNCOMPLICATED: ICD-10-CM

## 2021-06-09 DIAGNOSIS — R51.9 HEADACHE, UNSPECIFIED: ICD-10-CM

## 2021-06-09 DIAGNOSIS — T40.7X1A POISONING BY CANNABIS (DERIVATIVES), ACCIDENTAL (UNINTENTIONAL), INITIAL ENCOUNTER: ICD-10-CM

## 2021-06-09 LAB
CMV DNA CSF QL NAA+PROBE: SIGNIFICANT CHANGE UP
HEV AB FLD QL: NEGATIVE — SIGNIFICANT CHANGE UP

## 2021-06-10 LAB
A1AT PHENOTYP SERPL-IMP: SIGNIFICANT CHANGE UP
A1AT SERPL-MCNC: 176 MG/DL — HIGH (ref 95–164)
HEV IGM SER QL: SIGNIFICANT CHANGE UP

## 2021-06-11 LAB
IGG SERPL-MCNC: 1372 MG/DL — SIGNIFICANT CHANGE UP (ref 603–1613)
IGG1 SER-MCNC: 675 MG/DL — SIGNIFICANT CHANGE UP (ref 248–810)
IGG2 SER-MCNC: 489 MG/DL — SIGNIFICANT CHANGE UP (ref 130–555)
IGG3 SER-MCNC: 13 MG/DL — LOW (ref 15–102)
IGG4 SER-MCNC: 4 MG/DL — SIGNIFICANT CHANGE UP (ref 2–96)

## 2021-06-18 ENCOUNTER — APPOINTMENT (OUTPATIENT)
Dept: HEPATOLOGY | Facility: CLINIC | Age: 26
End: 2021-06-18
Payer: COMMERCIAL

## 2021-06-18 VITALS
DIASTOLIC BLOOD PRESSURE: 78 MMHG | SYSTOLIC BLOOD PRESSURE: 132 MMHG | HEIGHT: 71 IN | OXYGEN SATURATION: 100 % | WEIGHT: 281 LBS | TEMPERATURE: 98 F | HEART RATE: 79 BPM | BODY MASS INDEX: 39.34 KG/M2

## 2021-06-18 DIAGNOSIS — Z82.69 FAMILY HISTORY OF OTHER DISEASES OF THE MUSCULOSKELETAL SYSTEM AND CONNECTIVE TISSUE: ICD-10-CM

## 2021-06-18 PROCEDURE — 99204 OFFICE O/P NEW MOD 45 MIN: CPT
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
## 2021-06-18 NOTE — ASSESSMENT
[FreeTextEntry1] : 26 y/o M w/ hx of erosive esophagitis, gastroparesis, obesity here for elevated liver enzymes since about May 2021. \par \par Routine liver work-up\par Hard to explain relation of gastroparesis to elevated liver enzymes\par MRCP unremarkable from 5/2021\par ?Autoimmune process like lupus\par ?Viral process\par Gastroparesis symptoms appear to be improving

## 2021-06-18 NOTE — PHYSICAL EXAM
[General Appearance - Alert] : alert [General Appearance - In No Acute Distress] : in no acute distress [Sclera] : the sclera and conjunctiva were normal [Extraocular Movements] : extraocular movements were intact [PERRL With Normal Accommodation] : pupils were equal in size, round, and reactive to light [Outer Ear] : the ears and nose were normal in appearance [Oropharynx] : the oropharynx was normal [Neck Appearance] : the appearance of the neck was normal [Neck Cervical Mass (___cm)] : no neck mass was observed [Jugular Venous Distention Increased] : there was no jugular-venous distention [Thyroid Nodule] : there were no palpable thyroid nodules [Thyroid Diffuse Enlargement] : the thyroid was not enlarged [Auscultation Breath Sounds / Voice Sounds] : lungs were clear to auscultation bilaterally [Heart Rate And Rhythm] : heart rate was normal and rhythm regular [Heart Sounds] : normal S1 and S2 [Heart Sounds Gallop] : no gallops [Murmurs] : no murmurs [Heart Sounds Pericardial Friction Rub] : no pericardial rub [Bowel Sounds] : normal bowel sounds [Abdomen Soft] : soft [Abdomen Tenderness] : non-tender [Abdomen Mass (___ Cm)] : no abdominal mass palpated [Abnormal Walk] : normal gait [Nail Clubbing] : no clubbing  or cyanosis of the fingernails [Musculoskeletal - Swelling] : no joint swelling seen [Motor Tone] : muscle strength and tone were normal [Skin Color & Pigmentation] : normal skin color and pigmentation [Skin Turgor] : normal skin turgor [] : no rash [Oriented To Time, Place, And Person] : oriented to person, place, and time [Impaired Insight] : insight and judgment were intact [Affect] : the affect was normal [Scleral Icterus] : No Scleral Icterus [Abdominal  Ascites] : no ascites [Asterixis] : no asterixis observed [Jaundice] : No jaundice [Depression] : no depression

## 2021-06-21 LAB
ALBUMIN SERPL ELPH-MCNC: 4.5 G/DL
ALP BLD-CCNC: 82 U/L
ALT SERPL-CCNC: 131 U/L
ANA SER IF-ACNC: NEGATIVE
ANION GAP SERPL CALC-SCNC: 14 MMOL/L
AST SERPL-CCNC: 55 U/L
BASOPHILS # BLD AUTO: 0.02 K/UL
BASOPHILS NFR BLD AUTO: 0.4 %
BILIRUB SERPL-MCNC: 0.6 MG/DL
BUN SERPL-MCNC: 6 MG/DL
C3 SERPL-MCNC: 198 MG/DL
C4 SERPL-MCNC: 41 MG/DL
CALCIUM SERPL-MCNC: 10.2 MG/DL
CHLORIDE SERPL-SCNC: 105 MMOL/L
CHOLEST SERPL-MCNC: 247 MG/DL
CK SERPL-CCNC: 74 U/L
CO2 SERPL-SCNC: 22 MMOL/L
COVID-19 SPIKE DOMAIN ANTIBODY INTERPRETATION: NEGATIVE
CREAT SERPL-MCNC: 0.77 MG/DL
DEPRECATED KAPPA LC FREE/LAMBDA SER: 2.12 RATIO
DSDNA AB SER-ACNC: <12 IU/ML
EOSINOPHIL # BLD AUTO: 0.21 K/UL
EOSINOPHIL NFR BLD AUTO: 4.3 %
ERYTHROCYTE [SEDIMENTATION RATE] IN BLOOD BY WESTERGREN METHOD: 35 MM/HR
GLUCOSE SERPL-MCNC: 97 MG/DL
HAV IGM SER QL: NONREACTIVE
HBV CORE IGG+IGM SER QL: NONREACTIVE
HBV SURFACE AB SER QL: REACTIVE
HBV SURFACE AG SER QL: NONREACTIVE
HCT VFR BLD CALC: 40.5 %
HCV AB SER QL: NONREACTIVE
HCV S/CO RATIO: 0.44 S/CO
HDLC SERPL-MCNC: 49 MG/DL
HEPATITIS A IGG ANTIBODY: NONREACTIVE
HGB BLD-MCNC: 13 G/DL
IGA SER QL IEP: 194 MG/DL
IGG SER QL IEP: 1302 MG/DL
IGM SER QL IEP: 100 MG/DL
IMM GRANULOCYTES NFR BLD AUTO: 0.6 %
INR PPP: 0.98 RATIO
KAPPA LC CSF-MCNC: 0.78 MG/DL
KAPPA LC SERPL-MCNC: 1.65 MG/DL
LDLC SERPL CALC-MCNC: 183 MG/DL
LYMPHOCYTES # BLD AUTO: 1.5 K/UL
LYMPHOCYTES NFR BLD AUTO: 30.5 %
MAN DIFF?: NORMAL
MCHC RBC-ENTMCNC: 29.1 PG
MCHC RBC-ENTMCNC: 32.1 GM/DL
MCV RBC AUTO: 90.8 FL
MONOCYTES # BLD AUTO: 0.49 K/UL
MONOCYTES NFR BLD AUTO: 10 %
NEUTROPHILS # BLD AUTO: 2.67 K/UL
NEUTROPHILS NFR BLD AUTO: 54.2 %
NONHDLC SERPL-MCNC: 198 MG/DL
PLATELET # BLD AUTO: 421 K/UL
POTASSIUM SERPL-SCNC: 4.8 MMOL/L
PROT SERPL-MCNC: 7.5 G/DL
PT BLD: 11.6 SEC
RBC # BLD: 4.46 M/UL
RBC # FLD: 15.8 %
RHEUMATOID FACT SER QL: <10 IU/ML
SARS-COV-2 AB SERPL IA-ACNC: 0.4 U/ML
SODIUM SERPL-SCNC: 141 MMOL/L
TRIGL SERPL-MCNC: 76 MG/DL
TSH SERPL-ACNC: 1.3 UIU/ML
TTG IGA SER IA-ACNC: <1.2 U/ML
TTG IGA SER-ACNC: NEGATIVE
WBC # FLD AUTO: 4.92 K/UL

## 2021-06-22 LAB
ACE BLD-CCNC: 24 U/L
RPR SER-TITR: NORMAL

## 2021-06-23 LAB — SMOOTH MUSCLE AB SER QL IF: NORMAL

## 2021-06-28 ENCOUNTER — APPOINTMENT (OUTPATIENT)
Dept: UROLOGY | Facility: CLINIC | Age: 26
End: 2021-06-28

## 2021-06-28 DIAGNOSIS — N30.90 CYSTITIS, UNSPECIFIED W/OUT HEMATURIA: ICD-10-CM

## 2021-06-28 DIAGNOSIS — R93.41 ABNORMAL RADIOLOGIC FINDINGS ON DIAGNOSTIC IMAGING OF OF RENAL PELVIS, URETER, OR BLADDER: ICD-10-CM

## 2021-07-06 ENCOUNTER — APPOINTMENT (OUTPATIENT)
Dept: CARDIOLOGY | Facility: CLINIC | Age: 26
End: 2021-07-06

## 2021-07-09 ENCOUNTER — APPOINTMENT (OUTPATIENT)
Dept: HEPATOLOGY | Facility: CLINIC | Age: 26
End: 2021-07-09
Payer: COMMERCIAL

## 2021-07-09 PROCEDURE — 91200 LIVER ELASTOGRAPHY: CPT

## 2021-07-09 PROCEDURE — 99214 OFFICE O/P EST MOD 30 MIN: CPT

## 2021-07-09 NOTE — PHYSICAL EXAM
[General Appearance - Alert] : alert [General Appearance - In No Acute Distress] : in no acute distress [Sclera] : the sclera and conjunctiva were normal [PERRL With Normal Accommodation] : pupils were equal in size, round, and reactive to light [Extraocular Movements] : extraocular movements were intact [Outer Ear] : the ears and nose were normal in appearance [Oropharynx] : the oropharynx was normal [Neck Appearance] : the appearance of the neck was normal [Neck Cervical Mass (___cm)] : no neck mass was observed [Jugular Venous Distention Increased] : there was no jugular-venous distention [Thyroid Diffuse Enlargement] : the thyroid was not enlarged [Thyroid Nodule] : there were no palpable thyroid nodules [Auscultation Breath Sounds / Voice Sounds] : lungs were clear to auscultation bilaterally [Heart Rate And Rhythm] : heart rate was normal and rhythm regular [Heart Sounds] : normal S1 and S2 [Heart Sounds Gallop] : no gallops [Murmurs] : no murmurs [Heart Sounds Pericardial Friction Rub] : no pericardial rub [Bowel Sounds] : normal bowel sounds [Abdomen Soft] : soft [Abdomen Tenderness] : non-tender [Abdomen Mass (___ Cm)] : no abdominal mass palpated [Abnormal Walk] : normal gait [Nail Clubbing] : no clubbing  or cyanosis of the fingernails [Musculoskeletal - Swelling] : no joint swelling seen [Motor Tone] : muscle strength and tone were normal [Skin Color & Pigmentation] : normal skin color and pigmentation [Skin Turgor] : normal skin turgor [] : no rash [Oriented To Time, Place, And Person] : oriented to person, place, and time [Impaired Insight] : insight and judgment were intact [Affect] : the affect was normal [Scleral Icterus] : No Scleral Icterus [Abdominal  Ascites] : no ascites [Asterixis] : no asterixis observed [Jaundice] : No jaundice [Depression] : no depression
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
## 2021-07-09 NOTE — ASSESSMENT
[FreeTextEntry1] : 27 y/o M w/ hx of erosive esophagitis, gastroparesis, obesity here for elevated liver enzymes since about May 2021. \par \par Fibroscan 7/9/21 shows S3, F0-F1 c/w NAFLD.\par \par Suspect that he had a systemic viral process that causes gastroparesis, elevated liver enzymes, neurologic symptoms including fatigue. All of which are improving. \par \par MRCP unremarkable from 5/2021\par \par I have advised the patient on maintaining a healthy lifestyle which includes keeping a healthy diet (Mediterranean diet is preferred), calorie reduction of 30%, and regular exercise of at least 150 minutes a week to improve His fatty liver disease. I have also advised Mr. SANDERS on avoidance of hepatotoxic agents and alcohol.\par \par RTC 3 months w/ labs

## 2021-07-20 DIAGNOSIS — R74.8 ABNORMAL LEVELS OF OTHER SERUM ENZYMES: ICD-10-CM

## 2021-10-11 ENCOUNTER — APPOINTMENT (OUTPATIENT)
Dept: HEPATOLOGY | Facility: CLINIC | Age: 26
End: 2021-10-11

## 2022-07-15 ENCOUNTER — EMERGENCY (EMERGENCY)
Facility: HOSPITAL | Age: 27
LOS: 0 days | Discharge: HOME | End: 2022-07-16
Attending: STUDENT IN AN ORGANIZED HEALTH CARE EDUCATION/TRAINING PROGRAM

## 2022-07-15 VITALS
TEMPERATURE: 98 F | HEART RATE: 80 BPM | RESPIRATION RATE: 18 BRPM | OXYGEN SATURATION: 95 % | SYSTOLIC BLOOD PRESSURE: 125 MMHG | HEIGHT: 72 IN | DIASTOLIC BLOOD PRESSURE: 85 MMHG | WEIGHT: 315 LBS

## 2022-07-15 VITALS
DIASTOLIC BLOOD PRESSURE: 60 MMHG | SYSTOLIC BLOOD PRESSURE: 106 MMHG | HEART RATE: 81 BPM | RESPIRATION RATE: 32 BRPM | OXYGEN SATURATION: 95 %

## 2022-07-15 DIAGNOSIS — R50.9 FEVER, UNSPECIFIED: ICD-10-CM

## 2022-07-15 DIAGNOSIS — K31.84 GASTROPARESIS: ICD-10-CM

## 2022-07-15 DIAGNOSIS — Z20.822 CONTACT WITH AND (SUSPECTED) EXPOSURE TO COVID-19: ICD-10-CM

## 2022-07-15 DIAGNOSIS — R07.89 OTHER CHEST PAIN: ICD-10-CM

## 2022-07-15 DIAGNOSIS — R09.81 NASAL CONGESTION: ICD-10-CM

## 2022-07-15 DIAGNOSIS — Z87.09 PERSONAL HISTORY OF OTHER DISEASES OF THE RESPIRATORY SYSTEM: ICD-10-CM

## 2022-07-15 DIAGNOSIS — Z87.81 PERSONAL HISTORY OF (HEALED) TRAUMATIC FRACTURE: Chronic | ICD-10-CM

## 2022-07-15 LAB
ALBUMIN SERPL ELPH-MCNC: 3.9 G/DL — SIGNIFICANT CHANGE UP (ref 3.3–5)
ALP SERPL-CCNC: 78 U/L — SIGNIFICANT CHANGE UP (ref 40–120)
ALT FLD-CCNC: 53 U/L — SIGNIFICANT CHANGE UP (ref 12–78)
ANION GAP SERPL CALC-SCNC: 8 MMOL/L — SIGNIFICANT CHANGE UP (ref 5–17)
AST SERPL-CCNC: 32 U/L — SIGNIFICANT CHANGE UP (ref 15–37)
BASOPHILS # BLD AUTO: 0.05 K/UL — SIGNIFICANT CHANGE UP (ref 0–0.2)
BASOPHILS NFR BLD AUTO: 0.4 % — SIGNIFICANT CHANGE UP (ref 0–2)
BILIRUB SERPL-MCNC: 1.2 MG/DL — SIGNIFICANT CHANGE UP (ref 0.2–1.2)
BUN SERPL-MCNC: 8 MG/DL — SIGNIFICANT CHANGE UP (ref 7–23)
CALCIUM SERPL-MCNC: 9.8 MG/DL — SIGNIFICANT CHANGE UP (ref 8.5–10.1)
CHLORIDE SERPL-SCNC: 104 MMOL/L — SIGNIFICANT CHANGE UP (ref 96–108)
CO2 SERPL-SCNC: 26 MMOL/L — SIGNIFICANT CHANGE UP (ref 22–31)
CREAT SERPL-MCNC: 1.02 MG/DL — SIGNIFICANT CHANGE UP (ref 0.5–1.3)
EGFR: 105 ML/MIN/1.73M2 — SIGNIFICANT CHANGE UP
EOSINOPHIL # BLD AUTO: 0.43 K/UL — SIGNIFICANT CHANGE UP (ref 0–0.5)
EOSINOPHIL NFR BLD AUTO: 3.6 % — SIGNIFICANT CHANGE UP (ref 0–6)
FLUAV AG NPH QL: SIGNIFICANT CHANGE UP
FLUBV AG NPH QL: SIGNIFICANT CHANGE UP
GLUCOSE SERPL-MCNC: 91 MG/DL — SIGNIFICANT CHANGE UP (ref 70–99)
HCT VFR BLD CALC: 43.4 % — SIGNIFICANT CHANGE UP (ref 39–50)
HGB BLD-MCNC: 14.3 G/DL — SIGNIFICANT CHANGE UP (ref 13–17)
IMM GRANULOCYTES NFR BLD AUTO: 0.3 % — SIGNIFICANT CHANGE UP (ref 0–1.5)
LYMPHOCYTES # BLD AUTO: 1.87 K/UL — SIGNIFICANT CHANGE UP (ref 1–3.3)
LYMPHOCYTES # BLD AUTO: 15.6 % — SIGNIFICANT CHANGE UP (ref 13–44)
MCHC RBC-ENTMCNC: 28.3 PG — SIGNIFICANT CHANGE UP (ref 27–34)
MCHC RBC-ENTMCNC: 32.9 G/DL — SIGNIFICANT CHANGE UP (ref 32–36)
MCV RBC AUTO: 85.8 FL — SIGNIFICANT CHANGE UP (ref 80–100)
MONOCYTES # BLD AUTO: 0.88 K/UL — SIGNIFICANT CHANGE UP (ref 0–0.9)
MONOCYTES NFR BLD AUTO: 7.3 % — SIGNIFICANT CHANGE UP (ref 2–14)
NEUTROPHILS # BLD AUTO: 8.74 K/UL — HIGH (ref 1.8–7.4)
NEUTROPHILS NFR BLD AUTO: 72.8 % — SIGNIFICANT CHANGE UP (ref 43–77)
NRBC # BLD: 0 /100 WBCS — SIGNIFICANT CHANGE UP (ref 0–0)
PLATELET # BLD AUTO: 326 K/UL — SIGNIFICANT CHANGE UP (ref 150–400)
POTASSIUM SERPL-MCNC: 4.1 MMOL/L — SIGNIFICANT CHANGE UP (ref 3.5–5.3)
POTASSIUM SERPL-SCNC: 4.1 MMOL/L — SIGNIFICANT CHANGE UP (ref 3.5–5.3)
PROT SERPL-MCNC: 8.4 GM/DL — HIGH (ref 6–8.3)
RBC # BLD: 5.06 M/UL — SIGNIFICANT CHANGE UP (ref 4.2–5.8)
RBC # FLD: 13.6 % — SIGNIFICANT CHANGE UP (ref 10.3–14.5)
SARS-COV-2 RNA SPEC QL NAA+PROBE: SIGNIFICANT CHANGE UP
SODIUM SERPL-SCNC: 138 MMOL/L — SIGNIFICANT CHANGE UP (ref 135–145)
TROPONIN I, HIGH SENSITIVITY RESULT: 3.5 NG/L — SIGNIFICANT CHANGE UP
WBC # BLD: 12 K/UL — HIGH (ref 3.8–10.5)
WBC # FLD AUTO: 12 K/UL — HIGH (ref 3.8–10.5)

## 2022-07-15 PROCEDURE — 99285 EMERGENCY DEPT VISIT HI MDM: CPT

## 2022-07-15 PROCEDURE — 71045 X-RAY EXAM CHEST 1 VIEW: CPT | Mod: 26

## 2022-07-15 PROCEDURE — 93010 ELECTROCARDIOGRAM REPORT: CPT

## 2022-07-15 RX ORDER — KETOROLAC TROMETHAMINE 30 MG/ML
15 SYRINGE (ML) INJECTION ONCE
Refills: 0 | Status: DISCONTINUED | OUTPATIENT
Start: 2022-07-15 | End: 2022-07-15

## 2022-07-15 RX ORDER — IPRATROPIUM/ALBUTEROL SULFATE 18-103MCG
3 AEROSOL WITH ADAPTER (GRAM) INHALATION ONCE
Refills: 0 | Status: COMPLETED | OUTPATIENT
Start: 2022-07-15 | End: 2022-07-15

## 2022-07-15 RX ADMIN — Medication 3 MILLILITER(S): at 23:04

## 2022-07-15 NOTE — ED PROVIDER NOTE - PHYSICAL EXAMINATION
GENERAL: Awake, alert, NAD, obese  HEENT: NC/AT, moist mucous membranes  LUNGS: CTAB, no wheezes or crackles   CARDIAC: RRR, no m/r/g  ABDOMEN: Soft, normal BS, non tender, non distended, no rebound, no guarding  BACK: No midline spinal tenderness, no CVA tenderness  EXT: No edema, no calf tenderness, 2+ DP pulses bilaterally, no deformities.  NEURO: A&Ox3. Moving all extremities.  SKIN: Warm and dry. No rash.  PSYCH: Normal affect.

## 2022-07-15 NOTE — ED ADULT NURSE NOTE - NSICDXPASTSURGICALHX_GEN_ALL_CORE_FT
PAST SURGICAL HISTORY:  No significant past surgical history     Status post fracture of left tibia

## 2022-07-15 NOTE — ED PROVIDER NOTE - NSFOLLOWUPINSTRUCTIONS_ED_ALL_ED_FT
You were seen in the ED for chest pain.    Your labs, xray, and EKG did not show any acute findings.    Follow up with your primary care provider.    Chest pain can be caused by many different conditions which may or may not be dangerous. Causes include heartburn, lung infections, heart attack, blood clot in lungs, skin infections, strain or damage to muscle, cartilage, or bones, etc. In addition to a history and physical examination, an electrocardiogram (ECG) or other lab tests may have been performed to determine the cause of your chest pain. Follow up with your primary care provider or with a cardiologist as instructed.     SEEK IMMEDIATE MEDICAL CARE IF YOU HAVE ANY OF THE FOLLOWING SYMPTOMS: worsening chest pain, coughing up blood, unexplained back/neck/jaw pain, severe abdominal pain, dizziness or lightheadedness, fainting, shortness of breath, sweaty or clammy skin, vomiting, or racing heart beat. These symptoms may represent a serious problem that is an emergency. Do not wait to see if the symptoms will go away. Get medical help right away. Call 911 and do not drive yourself to the hospital.

## 2022-07-15 NOTE — ED PROVIDER NOTE - NS ED ROS FT
CONST: + fevers, no chills  EYES: no pain, no vision changes  ENT: no sore throat, no ear pain, no change in hearing  CV: + chest pain, no leg swelling  RESP: no shortness of breath, + cough  ABD: no abdominal pain, no nausea, no vomiting, no diarrhea  : no dysuria, no flank pain, no hematuria  MSK: no back pain, no extremity pain  NEURO: no headache or additional neurologic complaints  HEME: no easy bleeding  SKIN:  no rash

## 2022-07-15 NOTE — ED PROVIDER NOTE - CLINICAL SUMMARY MEDICAL DECISION MAKING FREE TEXT BOX
28 y/o M w/ PMH asthma, gastroparesis presenting to the ED with chest pain. Vitals stable. Patient is well appearing in NAD. Will obtain labs and CXR although low suspicion for ACS given lack of risk factors. Will send flu swab. Will treat with toradol and give duoneb in setting of chest tightness and URI sx.

## 2022-07-15 NOTE — ED ADULT NURSE NOTE - OBJECTIVE STATEMENT
patient is A&Ox4. PMH asthma, gastroparesis, obesity. Complaining of midsternal chest pain and chest tightness that began today. non-radiating. denies anything making it better or worse. denies SOB, difficulty breathing, chills, abdominal pain, headache. Patient complaining of nasal congestion and coughing up clear phlegm since yesterday. patient states "it felt like asthma attack". denies having any asthma attack since he was 10 years old. denies being intubated, or hospitalized recently. no wheezes or crackles auscultated. denies any other symptoms. denies using albuterol at home regularly.

## 2022-07-15 NOTE — ED ADULT NURSE NOTE - ED STAT RN HANDOFF DETAILS
Report given to Sanam EMERY. patient is A&Ox4. Breathing unlabored on RA.  On cardiac monitor. No acute or respiratory distress noted. Endorsed all concerns to RN. Fall and safety precautions maintained. Patient in stable condition at this time.

## 2022-07-15 NOTE — ED PROVIDER NOTE - OBJECTIVE STATEMENT
28 y/o M with PMH gastroparesis, asthma, presenting to the ED with chest pain. Patient states the pain began today, is midsternal, and stays in the center of his chest. Not associated with difficulty breathing. Does endorse low grade fever, nasal congestion, and coughing up phlegm which began yesterday. He has not had an asthma attack since he was a child and does not use albuterol regularly.

## 2022-07-15 NOTE — ED ADULT TRIAGE NOTE - CHIEF COMPLAINT QUOTE
pt c/o chest tightness, sob, sweating since yesterday. vomiting x 1 last night. pt denies dizziness. hx: asthma

## 2022-07-15 NOTE — ED PROVIDER NOTE - PATIENT PORTAL LINK FT
You can access the FollowMyHealth Patient Portal offered by [facility] by registering at the following website: [email or web address]. By joining [employer]’s FollowMyHealth portal, you will also be able to view your health information using other applications (apps) compatible with our system.

## 2022-07-16 RX ORDER — ALBUTEROL 90 UG/1
2 AEROSOL, METERED ORAL
Qty: 1 | Refills: 0
Start: 2022-07-16 | End: 2022-08-14

## 2022-12-12 NOTE — PATIENT PROFILE ADULT - LEGAL HELP
Establish care, annual check up      Health Maintenance Due   Topic Date Due   • Hepatitis B Vaccine (1 of 3 - 3-dose series) Never done   • COVID-19 Vaccine (1) Never done   • Varicella Vaccine (1 of 2 - 2-dose childhood series) Never done   • DTaP/Tdap/Td Vaccine (1 - Tdap) Never done       Patient is due for topics as listed above but is not proceeding with Immunization(s) COVID-19, Dtap/Tdap/Td, Hep B and Varicella at this time.          no

## 2024-01-02 NOTE — PATIENT PROFILE ADULT - CENTRAL VENOUS CATHETER/PICC LINE
Last appointment: 9/11/23  Next appointment: none  Previous refill encounter(s): 9/11/23 #30 with 4 refills    Requested Prescriptions     Pending Prescriptions Disp Refills    metoprolol succinate (TOPROL XL) 50 MG extended release tablet [Pharmacy Med Name: METOPROLOL SUCC ER 50 MG TAB] 90 tablet 1     Sig: TAKE 1 TABLETS BY MOUTH NIGHTLY.         For Pharmacy Admin Tracking Only    Program: Medication Refill  CPA in place:    Recommendation Provided To:   Intervention Detail: New Rx: 1, reason: Patient Preference  Intervention Accepted By:   Gap Closed?:    Time Spent (min): 5  
no

## 2024-01-17 NOTE — ED ADULT TRIAGE NOTE - PAIN RATING/NUMBER SCALE (0-10): REST
Patient called and said he needs a clearance for an MRI, first they said they didn't need a clearance now they do. He was here on 12/23. They want to know if you are able to fill out a clearance form for him using that visit. Is that something you can do?  They said they can get him in for an MRI on the 18th is the form is filled out. Please let me know   9

## 2024-01-29 NOTE — DIETITIAN INITIAL EVALUATION ADULT. - FLUID ACCUMULATION
Detail Level: Detailed Size Of Lesion: 4 x 3 mm Morphology Per Location (Optional): brown macule darker in the center No edema documented.
